# Patient Record
Sex: MALE | Race: WHITE | NOT HISPANIC OR LATINO
[De-identification: names, ages, dates, MRNs, and addresses within clinical notes are randomized per-mention and may not be internally consistent; named-entity substitution may affect disease eponyms.]

---

## 2020-10-30 PROBLEM — Z00.00 ENCOUNTER FOR PREVENTIVE HEALTH EXAMINATION: Status: ACTIVE | Noted: 2020-10-30

## 2020-11-19 DIAGNOSIS — Z87.2 PERSONAL HISTORY OF DISEASES OF THE SKIN AND SUBCUTANEOUS TISSUE: ICD-10-CM

## 2020-11-19 DIAGNOSIS — Z86.39 PERSONAL HISTORY OF OTHER ENDOCRINE, NUTRITIONAL AND METABOLIC DISEASE: ICD-10-CM

## 2020-11-19 DIAGNOSIS — Z78.9 OTHER SPECIFIED HEALTH STATUS: ICD-10-CM

## 2020-11-19 DIAGNOSIS — Z87.19 PERSONAL HISTORY OF OTHER DISEASES OF THE DIGESTIVE SYSTEM: ICD-10-CM

## 2020-11-19 DIAGNOSIS — N40.0 BENIGN PROSTATIC HYPERPLASIA WITHOUT LOWER URINARY TRACT SYMPMS: ICD-10-CM

## 2021-01-21 ENCOUNTER — RX RENEWAL (OUTPATIENT)
Age: 86
End: 2021-01-21

## 2021-04-02 ENCOUNTER — APPOINTMENT (OUTPATIENT)
Dept: INTERNAL MEDICINE | Facility: CLINIC | Age: 86
End: 2021-04-02
Payer: MEDICARE

## 2021-04-02 VITALS — HEART RATE: 70 BPM | SYSTOLIC BLOOD PRESSURE: 144 MMHG | RESPIRATION RATE: 16 BRPM | DIASTOLIC BLOOD PRESSURE: 88 MMHG

## 2021-04-02 VITALS — BODY MASS INDEX: 28.49 KG/M2 | WEIGHT: 188 LBS | HEIGHT: 68 IN

## 2021-04-02 PROCEDURE — G0439: CPT

## 2021-04-02 PROCEDURE — 36415 COLL VENOUS BLD VENIPUNCTURE: CPT

## 2021-04-02 NOTE — PLAN
[FreeTextEntry1] : His exam is essentially unchanged.  New\par \par Hypertension his blood pressure is slightly elevated today but he has been only taking one of his Zestoretic pills not to.  He will go back to taking 2 a day and try to monitor his pressure at home and if is any problem he will follow-up here.\par \par History of BPH–he has been relatively asymptomatic and will continue with Barboza and 10 mg daily.\par \par Arthritis–he uses Voltaren on a as needed basis and was told to try to limit how much of that he takes and he can try supplementing with Tylenol.\par \par He is planning on getting the first Covid vaccine hopefully next week.

## 2021-04-02 NOTE — REVIEW OF SYSTEMS
[Joint Pain] : joint pain [Back Pain] : back pain [Negative] : Psychiatric [Fever] : no fever [Chills] : no chills [Recent Change In Weight] : ~T no recent weight change [Chest Pain] : no chest pain [Palpitations] : no palpitations [Lower Ext Edema] : no lower extremity edema [Shortness Of Breath] : no shortness of breath [Abdominal Pain] : no abdominal pain [Diarrhea] : no diarrhea [Dysuria] : no dysuria [Muscle Weakness] : no muscle weakness [Joint Swelling] : no joint swelling [Headache] : no headache [Dizziness] : no dizziness [Fainting] : no fainting

## 2021-04-02 NOTE — HISTORY OF PRESENT ILLNESS
[de-identified] : 88 y/o male presents for annual wellness visit and fasting labs.  He has been generally well without any recent illness.  Complains of some ongoing arthritis pain issues but other than that has been generally well.

## 2021-04-03 LAB
BASOPHILS # BLD AUTO: 0.01 K/UL
BASOPHILS NFR BLD AUTO: 0.2 %
EOSINOPHIL # BLD AUTO: 0.1 K/UL
EOSINOPHIL NFR BLD AUTO: 2.3 %
HCT VFR BLD CALC: 38.7 %
HGB BLD-MCNC: 12.8 G/DL
IMM GRANULOCYTES NFR BLD AUTO: 0.2 %
LYMPHOCYTES # BLD AUTO: 0.7 K/UL
LYMPHOCYTES NFR BLD AUTO: 16.3 %
MAN DIFF?: NORMAL
MCHC RBC-ENTMCNC: 30.3 PG
MCHC RBC-ENTMCNC: 33.1 GM/DL
MCV RBC AUTO: 91.5 FL
MONOCYTES # BLD AUTO: 0.41 K/UL
MONOCYTES NFR BLD AUTO: 9.6 %
NEUTROPHILS # BLD AUTO: 3.06 K/UL
NEUTROPHILS NFR BLD AUTO: 71.4 %
PLATELET # BLD AUTO: 153 K/UL
RBC # BLD: 4.23 M/UL
RBC # FLD: 13.6 %
WBC # FLD AUTO: 4.29 K/UL

## 2021-04-04 LAB
ALBUMIN SERPL ELPH-MCNC: 4.4 G/DL
ALP BLD-CCNC: 81 U/L
ALT SERPL-CCNC: 19 U/L
ANION GAP SERPL CALC-SCNC: 9 MMOL/L
AST SERPL-CCNC: 21 U/L
BILIRUB SERPL-MCNC: 0.7 MG/DL
BUN SERPL-MCNC: 27 MG/DL
CALCIUM SERPL-MCNC: 9.4 MG/DL
CHLORIDE SERPL-SCNC: 105 MMOL/L
CHOLEST SERPL-MCNC: 222 MG/DL
CO2 SERPL-SCNC: 26 MMOL/L
CREAT SERPL-MCNC: 0.81 MG/DL
GLUCOSE SERPL-MCNC: 101 MG/DL
HDLC SERPL-MCNC: 74 MG/DL
LDLC SERPL CALC-MCNC: 129 MG/DL
NONHDLC SERPL-MCNC: 147 MG/DL
POTASSIUM SERPL-SCNC: 4.3 MMOL/L
PROT SERPL-MCNC: 6.5 G/DL
PSA SERPL-MCNC: 5.94 NG/ML
SODIUM SERPL-SCNC: 140 MMOL/L
TRIGL SERPL-MCNC: 94 MG/DL

## 2021-05-06 ENCOUNTER — RX RENEWAL (OUTPATIENT)
Age: 86
End: 2021-05-06

## 2021-06-17 ENCOUNTER — APPOINTMENT (OUTPATIENT)
Dept: OPHTHALMOLOGY | Facility: CLINIC | Age: 86
End: 2021-06-17

## 2021-07-27 ENCOUNTER — RX RENEWAL (OUTPATIENT)
Age: 86
End: 2021-07-27

## 2021-08-10 ENCOUNTER — NON-APPOINTMENT (OUTPATIENT)
Age: 86
End: 2021-08-10

## 2021-08-10 ENCOUNTER — APPOINTMENT (OUTPATIENT)
Dept: OPHTHALMOLOGY | Facility: CLINIC | Age: 86
End: 2021-08-10
Payer: MEDICARE

## 2021-08-10 PROCEDURE — 92014 COMPRE OPH EXAM EST PT 1/>: CPT

## 2021-08-10 PROCEDURE — 92133 CPTRZD OPH DX IMG PST SGM ON: CPT

## 2021-10-10 ENCOUNTER — RX RENEWAL (OUTPATIENT)
Age: 86
End: 2021-10-10

## 2021-10-13 ENCOUNTER — APPOINTMENT (OUTPATIENT)
Dept: INTERNAL MEDICINE | Facility: CLINIC | Age: 86
End: 2021-10-13
Payer: MEDICARE

## 2021-10-13 ENCOUNTER — NON-APPOINTMENT (OUTPATIENT)
Age: 86
End: 2021-10-13

## 2021-10-13 VITALS
WEIGHT: 192 LBS | SYSTOLIC BLOOD PRESSURE: 130 MMHG | HEIGHT: 68 IN | BODY MASS INDEX: 29.1 KG/M2 | DIASTOLIC BLOOD PRESSURE: 85 MMHG

## 2021-10-13 DIAGNOSIS — Z23 ENCOUNTER FOR IMMUNIZATION: ICD-10-CM

## 2021-10-13 PROCEDURE — 90662 IIV NO PRSV INCREASED AG IM: CPT

## 2021-10-13 PROCEDURE — 99214 OFFICE O/P EST MOD 30 MIN: CPT | Mod: 25

## 2021-10-13 PROCEDURE — G0008: CPT

## 2021-10-13 PROCEDURE — 36415 COLL VENOUS BLD VENIPUNCTURE: CPT

## 2021-10-13 NOTE — ASSESSMENT
[FreeTextEntry1] : Hypertension–his blood pressure remains well controlled and he is going to continue with lisinopril HCT 40/25 daily.\par \par BPH–his symptoms have been stable and his previous PSA on 4/2/2021 was 5.94.  He will continue with Terazosin 10 mg daily.\par \par Hyperlipidemia–follow-up lipid profile was sent his previous cholesterol was 222 with an LDL of 129.  He is not on any medication at present.\par \par Arthritis–his symptoms are essentially unchanged and he does use Voltaren 75 mg as needed.  He was questioned about overusing it because of potential side effects and he understands.\par \par He received high-dose flu vaccine today.\par \par Total of 30 minutes was spent in this encounter.

## 2021-10-14 LAB
ALBUMIN SERPL ELPH-MCNC: 3.9 G/DL
ALP BLD-CCNC: 75 U/L
ALT SERPL-CCNC: 20 U/L
ANION GAP SERPL CALC-SCNC: 11 MMOL/L
AST SERPL-CCNC: 20 U/L
BASOPHILS # BLD AUTO: 0.02 K/UL
BASOPHILS NFR BLD AUTO: 0.5 %
BILIRUB SERPL-MCNC: 0.6 MG/DL
BUN SERPL-MCNC: 35 MG/DL
CALCIUM SERPL-MCNC: 9.3 MG/DL
CHLORIDE SERPL-SCNC: 104 MMOL/L
CHOLEST SERPL-MCNC: 194 MG/DL
CO2 SERPL-SCNC: 26 MMOL/L
CREAT SERPL-MCNC: 0.94 MG/DL
EOSINOPHIL # BLD AUTO: 0.09 K/UL
EOSINOPHIL NFR BLD AUTO: 2.3 %
GLUCOSE SERPL-MCNC: 104 MG/DL
HCT VFR BLD CALC: 39.1 %
HDLC SERPL-MCNC: 69 MG/DL
HGB BLD-MCNC: 12.5 G/DL
IMM GRANULOCYTES NFR BLD AUTO: 0.5 %
LDLC SERPL CALC-MCNC: 109 MG/DL
LYMPHOCYTES # BLD AUTO: 0.77 K/UL
LYMPHOCYTES NFR BLD AUTO: 19.3 %
MAN DIFF?: NORMAL
MCHC RBC-ENTMCNC: 30.3 PG
MCHC RBC-ENTMCNC: 32 GM/DL
MCV RBC AUTO: 94.9 FL
MONOCYTES # BLD AUTO: 0.34 K/UL
MONOCYTES NFR BLD AUTO: 8.5 %
NEUTROPHILS # BLD AUTO: 2.75 K/UL
NEUTROPHILS NFR BLD AUTO: 68.9 %
NONHDLC SERPL-MCNC: 125 MG/DL
PLATELET # BLD AUTO: 193 K/UL
POTASSIUM SERPL-SCNC: 4.7 MMOL/L
PROT SERPL-MCNC: 6.4 G/DL
RBC # BLD: 4.12 M/UL
RBC # FLD: 13.4 %
SODIUM SERPL-SCNC: 142 MMOL/L
TRIGL SERPL-MCNC: 83 MG/DL
WBC # FLD AUTO: 3.99 K/UL

## 2021-12-14 ENCOUNTER — NON-APPOINTMENT (OUTPATIENT)
Age: 86
End: 2021-12-14

## 2021-12-14 ENCOUNTER — APPOINTMENT (OUTPATIENT)
Dept: OPHTHALMOLOGY | Facility: CLINIC | Age: 86
End: 2021-12-14
Payer: MEDICARE

## 2021-12-14 PROCEDURE — 92250 FUNDUS PHOTOGRAPHY W/I&R: CPT

## 2021-12-14 PROCEDURE — 92014 COMPRE OPH EXAM EST PT 1/>: CPT

## 2022-02-02 ENCOUNTER — RX RENEWAL (OUTPATIENT)
Age: 87
End: 2022-02-02

## 2022-03-15 ENCOUNTER — RX RENEWAL (OUTPATIENT)
Age: 87
End: 2022-03-15

## 2022-04-02 ENCOUNTER — RX RENEWAL (OUTPATIENT)
Age: 87
End: 2022-04-02

## 2022-05-02 ENCOUNTER — APPOINTMENT (OUTPATIENT)
Dept: INTERNAL MEDICINE | Facility: CLINIC | Age: 87
End: 2022-05-02
Payer: MEDICARE

## 2022-05-02 ENCOUNTER — NON-APPOINTMENT (OUTPATIENT)
Age: 87
End: 2022-05-02

## 2022-05-02 VITALS — HEIGHT: 68 IN | BODY MASS INDEX: 28.49 KG/M2 | WEIGHT: 188 LBS

## 2022-05-02 VITALS — SYSTOLIC BLOOD PRESSURE: 138 MMHG | DIASTOLIC BLOOD PRESSURE: 70 MMHG

## 2022-05-02 PROCEDURE — G0439: CPT

## 2022-05-02 PROCEDURE — 36415 COLL VENOUS BLD VENIPUNCTURE: CPT

## 2022-05-02 NOTE — ASSESSMENT
[FreeTextEntry1] : His exam is unchanged/unremarkable.\par \par Hypertension–his blood pressure is controlled but high normal.  He will continue with lisinopril HCT 40/25 daily and [id number] mg daily.  He was asked to try to check his pressure periodically at home to make sure that it remains controlled.\par \par BPH–symptoms well controlled and he will continue Hytrin 10 mg daily.  A follow-up PSA was sent.\par \par Hyperlipidemia–follow-up lipid profile was sent.  His most recent cholesterol is well controlled but have been elevated in the past.  He is on the medication.\par \par Arthritis no significant change in his symptoms and he does take Voltaren 75 mg as needed.  He was told to try to limit this as much as possible and he understands.

## 2022-05-02 NOTE — HISTORY OF PRESENT ILLNESS
[de-identified] : 89 y/o male presents for annual Medicare wellness visit and prescription renewals.\par History of hypertension, BPH and arthritis.\par Has been generally well without any recent illness.  His wife has ongoing dementia issues and it is causing him stress.  He remains very independent and active.

## 2022-05-02 NOTE — HEALTH RISK ASSESSMENT
[Never] : Never [No] : In the past 12 months have you used drugs other than those required for medical reasons? No [No falls in past year] : Patient reported no falls in the past year [0] : 2) Feeling down, depressed, or hopeless: Not at all (0) [PHQ-2 Negative - No further assessment needed] : PHQ-2 Negative - No further assessment needed [[id number]] : 0

## 2022-05-03 LAB
ALBUMIN SERPL ELPH-MCNC: 4.2 G/DL
ALP BLD-CCNC: 76 U/L
ALT SERPL-CCNC: 15 U/L
ANION GAP SERPL CALC-SCNC: 13 MMOL/L
AST SERPL-CCNC: 16 U/L
BASOPHILS # BLD AUTO: 0.01 K/UL
BASOPHILS NFR BLD AUTO: 0.2 %
BILIRUB SERPL-MCNC: 0.7 MG/DL
BUN SERPL-MCNC: 28 MG/DL
CALCIUM SERPL-MCNC: 9 MG/DL
CHLORIDE SERPL-SCNC: 109 MMOL/L
CHOLEST SERPL-MCNC: 200 MG/DL
CO2 SERPL-SCNC: 24 MMOL/L
CREAT SERPL-MCNC: 0.89 MG/DL
EGFR: 82 ML/MIN/1.73M2
EOSINOPHIL # BLD AUTO: 0.06 K/UL
EOSINOPHIL NFR BLD AUTO: 1.4 %
GLUCOSE SERPL-MCNC: 104 MG/DL
HCT VFR BLD CALC: 38.3 %
HDLC SERPL-MCNC: 70 MG/DL
HGB BLD-MCNC: 12.4 G/DL
IMM GRANULOCYTES NFR BLD AUTO: 0.5 %
LDLC SERPL CALC-MCNC: 115 MG/DL
LYMPHOCYTES # BLD AUTO: 0.79 K/UL
LYMPHOCYTES NFR BLD AUTO: 18.1 %
MAN DIFF?: NORMAL
MCHC RBC-ENTMCNC: 30.2 PG
MCHC RBC-ENTMCNC: 32.4 GM/DL
MCV RBC AUTO: 93.4 FL
MONOCYTES # BLD AUTO: 0.34 K/UL
MONOCYTES NFR BLD AUTO: 7.8 %
NEUTROPHILS # BLD AUTO: 3.14 K/UL
NEUTROPHILS NFR BLD AUTO: 72 %
NONHDLC SERPL-MCNC: 129 MG/DL
PLATELET # BLD AUTO: 145 K/UL
POTASSIUM SERPL-SCNC: 4.3 MMOL/L
PROT SERPL-MCNC: 6.1 G/DL
PSA SERPL-MCNC: 5.69 NG/ML
RBC # BLD: 4.1 M/UL
RBC # FLD: 14.4 %
SODIUM SERPL-SCNC: 146 MMOL/L
TRIGL SERPL-MCNC: 73 MG/DL
WBC # FLD AUTO: 4.36 K/UL

## 2022-07-06 ENCOUNTER — RX RENEWAL (OUTPATIENT)
Age: 87
End: 2022-07-06

## 2022-08-02 ENCOUNTER — NON-APPOINTMENT (OUTPATIENT)
Age: 87
End: 2022-08-02

## 2022-08-02 ENCOUNTER — APPOINTMENT (OUTPATIENT)
Dept: OPHTHALMOLOGY | Facility: CLINIC | Age: 87
End: 2022-08-02

## 2022-08-02 PROCEDURE — 92012 INTRM OPH EXAM EST PATIENT: CPT

## 2022-09-05 ENCOUNTER — RX RENEWAL (OUTPATIENT)
Age: 87
End: 2022-09-05

## 2022-09-05 RX ORDER — DICLOFENAC SODIUM 75 MG/1
75 TABLET, DELAYED RELEASE ORAL
Qty: 180 | Refills: 0 | Status: ACTIVE | COMMUNITY
Start: 2021-01-21 | End: 1900-01-01

## 2022-10-31 ENCOUNTER — NON-APPOINTMENT (OUTPATIENT)
Age: 87
End: 2022-10-31

## 2022-10-31 ENCOUNTER — APPOINTMENT (OUTPATIENT)
Dept: INTERNAL MEDICINE | Facility: CLINIC | Age: 87
End: 2022-10-31

## 2022-10-31 VITALS
WEIGHT: 188 LBS | BODY MASS INDEX: 28.49 KG/M2 | DIASTOLIC BLOOD PRESSURE: 78 MMHG | SYSTOLIC BLOOD PRESSURE: 136 MMHG | HEIGHT: 68 IN

## 2022-10-31 DIAGNOSIS — M19.90 UNSPECIFIED OSTEOARTHRITIS, UNSPECIFIED SITE: ICD-10-CM

## 2022-10-31 PROCEDURE — 99214 OFFICE O/P EST MOD 30 MIN: CPT | Mod: 25

## 2022-10-31 PROCEDURE — G0008: CPT

## 2022-10-31 PROCEDURE — 36415 COLL VENOUS BLD VENIPUNCTURE: CPT | Mod: 59

## 2022-10-31 PROCEDURE — 90662 IIV NO PRSV INCREASED AG IM: CPT

## 2022-10-31 NOTE — REVIEW OF SYSTEMS
[Joint Pain] : joint pain [Back Pain] : back pain [Negative] : Psychiatric [Fever] : no fever [Chills] : no chills [Recent Change In Weight] : ~T no recent weight change [Chest Pain] : no chest pain [Palpitations] : no palpitations [Lower Ext Edema] : no lower extremity edema [Shortness Of Breath] : no shortness of breath [Dyspnea on Exertion] : not dyspnea on exertion [Abdominal Pain] : no abdominal pain [Diarrhea] : no diarrhea [Dysuria] : no dysuria [Muscle Weakness] : no muscle weakness [Joint Swelling] : no joint swelling [Headache] : no headache [Dizziness] : no dizziness [Fainting] : no fainting

## 2022-10-31 NOTE — ASSESSMENT
Call to patient regarding appointment scheduled on 4/1/20    Left message to call back to see if E visit could be compelted   [FreeTextEntry1] : Hypertension–his blood pressure remains well controlled and he will continue lisinopril HCT 40/25 daily.\par \par BPH–symptoms are controlled and he remains on Barboza and 10 mg daily.\par \par Hyperlipidemia–follow-up lipid profile sent.  He is not presently on medication.  Most recent cholesterol from 5/22 was 200 with an LDL of 115.\par \par Chronic arthritis/back/leg pain–.  There has been no significant change in his symptoms.\par \par He received high-dose flu vaccine today.

## 2022-10-31 NOTE — HISTORY OF PRESENT ILLNESS
[de-identified] : 88 y/o male presents for follow up and fasting labs.\par He has a history of hypertension, BPH as well as arthritis.\par Has been generally well without any recent illness.  Does complain of ongoing issues with arthritis involving his left leg mostly.  Does not have a lot of pain but does have some weakness when trying to climb stance.  No chest pain but occasional shortness of breath.  He has not been very active and spends most of his time at home with his wife.

## 2022-10-31 NOTE — PHYSICAL EXAM
[No Acute Distress] : no acute distress [No JVD] : no jugular venous distention [No Lymphadenopathy] : no lymphadenopathy [Clear to Auscultation] : lungs were clear to auscultation bilaterally [Normal Rate] : normal rate  [Regular Rhythm] : with a regular rhythm [No Edema] : there was no peripheral edema [No Joint Swelling] : no joint swelling [Grossly Normal Strength/Tone] : grossly normal strength/tone [No Focal Deficits] : no focal deficits [Alert and Oriented x3] : oriented to person, place, and time

## 2022-11-01 LAB
ALBUMIN SERPL ELPH-MCNC: 4.1 G/DL
ALP BLD-CCNC: 71 U/L
ALT SERPL-CCNC: 16 U/L
ANION GAP SERPL CALC-SCNC: 13 MMOL/L
AST SERPL-CCNC: 17 U/L
BASOPHILS # BLD AUTO: 0.02 K/UL
BASOPHILS NFR BLD AUTO: 0.5 %
BILIRUB SERPL-MCNC: 0.6 MG/DL
BUN SERPL-MCNC: 29 MG/DL
CALCIUM SERPL-MCNC: 9.4 MG/DL
CHLORIDE SERPL-SCNC: 107 MMOL/L
CHOLEST SERPL-MCNC: 191 MG/DL
CO2 SERPL-SCNC: 25 MMOL/L
CREAT SERPL-MCNC: 0.91 MG/DL
EGFR: 81 ML/MIN/1.73M2
EOSINOPHIL # BLD AUTO: 0.08 K/UL
EOSINOPHIL NFR BLD AUTO: 1.9 %
GLUCOSE SERPL-MCNC: 98 MG/DL
HCT VFR BLD CALC: 36.5 %
HDLC SERPL-MCNC: 68 MG/DL
HGB BLD-MCNC: 11.7 G/DL
IMM GRANULOCYTES NFR BLD AUTO: 0.2 %
LDLC SERPL CALC-MCNC: 107 MG/DL
LYMPHOCYTES # BLD AUTO: 0.65 K/UL
LYMPHOCYTES NFR BLD AUTO: 15.2 %
MAN DIFF?: NORMAL
MCHC RBC-ENTMCNC: 30.9 PG
MCHC RBC-ENTMCNC: 32.1 GM/DL
MCV RBC AUTO: 96.3 FL
MONOCYTES # BLD AUTO: 0.38 K/UL
MONOCYTES NFR BLD AUTO: 8.9 %
NEUTROPHILS # BLD AUTO: 3.14 K/UL
NEUTROPHILS NFR BLD AUTO: 73.3 %
NONHDLC SERPL-MCNC: 123 MG/DL
PLATELET # BLD AUTO: 152 K/UL
POTASSIUM SERPL-SCNC: 4.1 MMOL/L
PROT SERPL-MCNC: 6 G/DL
RBC # BLD: 3.79 M/UL
RBC # FLD: 14.4 %
SODIUM SERPL-SCNC: 145 MMOL/L
TRIGL SERPL-MCNC: 80 MG/DL
WBC # FLD AUTO: 4.28 K/UL

## 2022-11-08 ENCOUNTER — APPOINTMENT (OUTPATIENT)
Dept: OPHTHALMOLOGY | Facility: CLINIC | Age: 87
End: 2022-11-08

## 2022-11-08 ENCOUNTER — NON-APPOINTMENT (OUTPATIENT)
Age: 87
End: 2022-11-08

## 2022-11-08 PROCEDURE — 92133 CPTRZD OPH DX IMG PST SGM ON: CPT

## 2022-11-08 PROCEDURE — 92014 COMPRE OPH EXAM EST PT 1/>: CPT

## 2022-11-24 ENCOUNTER — RX RENEWAL (OUTPATIENT)
Age: 87
End: 2022-11-24

## 2023-02-24 ENCOUNTER — INPATIENT (INPATIENT)
Facility: HOSPITAL | Age: 88
LOS: 10 days | Discharge: NURSING, REHAB OR LONG-TERM CARE | DRG: 260 | End: 2023-03-07
Attending: HOSPITALIST
Payer: MEDICARE

## 2023-02-24 ENCOUNTER — NON-APPOINTMENT (OUTPATIENT)
Age: 88
End: 2023-02-24

## 2023-02-24 ENCOUNTER — APPOINTMENT (OUTPATIENT)
Dept: INTERNAL MEDICINE | Facility: CLINIC | Age: 88
End: 2023-02-24
Payer: MEDICARE

## 2023-02-24 VITALS
OXYGEN SATURATION: 100 % | RESPIRATION RATE: 20 BRPM | HEART RATE: 87 BPM | SYSTOLIC BLOOD PRESSURE: 138 MMHG | HEIGHT: 70 IN | TEMPERATURE: 98 F | DIASTOLIC BLOOD PRESSURE: 91 MMHG | WEIGHT: 182.1 LBS

## 2023-02-24 VITALS — SYSTOLIC BLOOD PRESSURE: 130 MMHG | HEART RATE: 100 BPM | DIASTOLIC BLOOD PRESSURE: 66 MMHG | RESPIRATION RATE: 22 BRPM

## 2023-02-24 DIAGNOSIS — I48.91 UNSPECIFIED ATRIAL FIBRILLATION: ICD-10-CM

## 2023-02-24 DIAGNOSIS — I50.9 HEART FAILURE, UNSPECIFIED: ICD-10-CM

## 2023-02-24 DIAGNOSIS — I10 ESSENTIAL (PRIMARY) HYPERTENSION: ICD-10-CM

## 2023-02-24 LAB
ALBUMIN SERPL ELPH-MCNC: 3.6 G/DL — SIGNIFICANT CHANGE UP (ref 3.3–5)
ALP SERPL-CCNC: 82 U/L — SIGNIFICANT CHANGE UP (ref 40–120)
ALT FLD-CCNC: 24 U/L — SIGNIFICANT CHANGE UP (ref 12–78)
ANION GAP SERPL CALC-SCNC: 5 MMOL/L — SIGNIFICANT CHANGE UP (ref 5–17)
APTT BLD: 31.3 SEC — SIGNIFICANT CHANGE UP (ref 27.5–35.5)
AST SERPL-CCNC: 18 U/L — SIGNIFICANT CHANGE UP (ref 15–37)
BASOPHILS # BLD AUTO: 0.02 K/UL — SIGNIFICANT CHANGE UP (ref 0–0.2)
BASOPHILS NFR BLD AUTO: 0.4 % — SIGNIFICANT CHANGE UP (ref 0–2)
BILIRUB SERPL-MCNC: 1 MG/DL — SIGNIFICANT CHANGE UP (ref 0.2–1.2)
BUN SERPL-MCNC: 55 MG/DL — HIGH (ref 7–23)
CALCIUM SERPL-MCNC: 9.3 MG/DL — SIGNIFICANT CHANGE UP (ref 8.5–10.1)
CHLORIDE SERPL-SCNC: 109 MMOL/L — HIGH (ref 96–108)
CO2 SERPL-SCNC: 25 MMOL/L — SIGNIFICANT CHANGE UP (ref 22–31)
CREAT SERPL-MCNC: 1.32 MG/DL — HIGH (ref 0.5–1.3)
D DIMER BLD IA.RAPID-MCNC: 1163 NG/ML DDU — HIGH
EGFR: 52 ML/MIN/1.73M2 — LOW
EOSINOPHIL # BLD AUTO: 0.06 K/UL — SIGNIFICANT CHANGE UP (ref 0–0.5)
EOSINOPHIL NFR BLD AUTO: 1.3 % — SIGNIFICANT CHANGE UP (ref 0–6)
GLUCOSE SERPL-MCNC: 110 MG/DL — HIGH (ref 70–99)
HCT VFR BLD CALC: 35.6 % — LOW (ref 39–50)
HGB BLD-MCNC: 11.2 G/DL — LOW (ref 13–17)
IMM GRANULOCYTES NFR BLD AUTO: 0.2 % — SIGNIFICANT CHANGE UP (ref 0–0.9)
INR BLD: 1.22 RATIO — HIGH (ref 0.88–1.16)
LYMPHOCYTES # BLD AUTO: 0.44 K/UL — LOW (ref 1–3.3)
LYMPHOCYTES # BLD AUTO: 9.2 % — LOW (ref 13–44)
MAGNESIUM SERPL-MCNC: 2.5 MG/DL — SIGNIFICANT CHANGE UP (ref 1.6–2.6)
MANUAL SMEAR VERIFICATION: SIGNIFICANT CHANGE UP
MCHC RBC-ENTMCNC: 29.2 PG — SIGNIFICANT CHANGE UP (ref 27–34)
MCHC RBC-ENTMCNC: 31.5 GM/DL — LOW (ref 32–36)
MCV RBC AUTO: 92.7 FL — SIGNIFICANT CHANGE UP (ref 80–100)
MONOCYTES # BLD AUTO: 0.44 K/UL — SIGNIFICANT CHANGE UP (ref 0–0.9)
MONOCYTES NFR BLD AUTO: 9.2 % — SIGNIFICANT CHANGE UP (ref 2–14)
NEUTROPHILS # BLD AUTO: 3.81 K/UL — SIGNIFICANT CHANGE UP (ref 1.8–7.4)
NEUTROPHILS NFR BLD AUTO: 79.7 % — HIGH (ref 43–77)
NT-PROBNP SERPL-SCNC: HIGH PG/ML (ref 0–450)
PLAT MORPH BLD: NORMAL — SIGNIFICANT CHANGE UP
PLATELET # BLD AUTO: 125 K/UL — LOW (ref 150–400)
POTASSIUM SERPL-MCNC: 4.2 MMOL/L — SIGNIFICANT CHANGE UP (ref 3.5–5.3)
POTASSIUM SERPL-SCNC: 4.2 MMOL/L — SIGNIFICANT CHANGE UP (ref 3.5–5.3)
PROT SERPL-MCNC: 7 GM/DL — SIGNIFICANT CHANGE UP (ref 6–8.3)
PROTHROM AB SERPL-ACNC: 14.2 SEC — HIGH (ref 10.5–13.4)
RAPID RVP RESULT: SIGNIFICANT CHANGE UP
RBC # BLD: 3.84 M/UL — LOW (ref 4.2–5.8)
RBC # FLD: 15.3 % — HIGH (ref 10.3–14.5)
RBC BLD AUTO: NORMAL — SIGNIFICANT CHANGE UP
SARS-COV-2 RNA SPEC QL NAA+PROBE: SIGNIFICANT CHANGE UP
SODIUM SERPL-SCNC: 139 MMOL/L — SIGNIFICANT CHANGE UP (ref 135–145)
TROPONIN I, HIGH SENSITIVITY RESULT: 90.54 NG/L — HIGH
WBC # BLD: 4.78 K/UL — SIGNIFICANT CHANGE UP (ref 3.8–10.5)
WBC # FLD AUTO: 4.78 K/UL — SIGNIFICANT CHANGE UP (ref 3.8–10.5)

## 2023-02-24 PROCEDURE — 93306 TTE W/DOPPLER COMPLETE: CPT

## 2023-02-24 PROCEDURE — 93458 L HRT ARTERY/VENTRICLE ANGIO: CPT

## 2023-02-24 PROCEDURE — C1894: CPT

## 2023-02-24 PROCEDURE — 80053 COMPREHEN METABOLIC PANEL: CPT

## 2023-02-24 PROCEDURE — 36415 COLL VENOUS BLD VENIPUNCTURE: CPT

## 2023-02-24 PROCEDURE — 99223 1ST HOSP IP/OBS HIGH 75: CPT

## 2023-02-24 PROCEDURE — 82272 OCCULT BLD FECES 1-3 TESTS: CPT

## 2023-02-24 PROCEDURE — 99214 OFFICE O/P EST MOD 30 MIN: CPT | Mod: 25

## 2023-02-24 PROCEDURE — 87086 URINE CULTURE/COLONY COUNT: CPT

## 2023-02-24 PROCEDURE — 80048 BASIC METABOLIC PNL TOTAL CA: CPT

## 2023-02-24 PROCEDURE — 93000 ELECTROCARDIOGRAM COMPLETE: CPT

## 2023-02-24 PROCEDURE — 71045 X-RAY EXAM CHEST 1 VIEW: CPT

## 2023-02-24 PROCEDURE — 85027 COMPLETE CBC AUTOMATED: CPT

## 2023-02-24 PROCEDURE — 71275 CT ANGIOGRAPHY CHEST: CPT | Mod: 26

## 2023-02-24 PROCEDURE — 97530 THERAPEUTIC ACTIVITIES: CPT | Mod: GP

## 2023-02-24 PROCEDURE — 85610 PROTHROMBIN TIME: CPT

## 2023-02-24 PROCEDURE — 84484 ASSAY OF TROPONIN QUANT: CPT

## 2023-02-24 PROCEDURE — 83735 ASSAY OF MAGNESIUM: CPT

## 2023-02-24 PROCEDURE — 97162 PT EVAL MOD COMPLEX 30 MIN: CPT | Mod: GP

## 2023-02-24 PROCEDURE — 87635 SARS-COV-2 COVID-19 AMP PRB: CPT

## 2023-02-24 PROCEDURE — C1769: CPT

## 2023-02-24 PROCEDURE — 82565 ASSAY OF CREATININE: CPT

## 2023-02-24 PROCEDURE — 97116 GAIT TRAINING THERAPY: CPT | Mod: GP

## 2023-02-24 PROCEDURE — 81001 URINALYSIS AUTO W/SCOPE: CPT

## 2023-02-24 PROCEDURE — 93010 ELECTROCARDIOGRAM REPORT: CPT

## 2023-02-24 PROCEDURE — U0003: CPT

## 2023-02-24 PROCEDURE — U0005: CPT

## 2023-02-24 PROCEDURE — 71045 X-RAY EXAM CHEST 1 VIEW: CPT | Mod: 26

## 2023-02-24 PROCEDURE — 80061 LIPID PANEL: CPT

## 2023-02-24 PROCEDURE — 93005 ELECTROCARDIOGRAM TRACING: CPT

## 2023-02-24 PROCEDURE — 85025 COMPLETE CBC W/AUTO DIFF WBC: CPT

## 2023-02-24 PROCEDURE — 99497 ADVNCD CARE PLAN 30 MIN: CPT | Mod: 25

## 2023-02-24 PROCEDURE — C1887: CPT

## 2023-02-24 PROCEDURE — 33285 INSJ SUBQ CAR RHYTHM MNTR: CPT

## 2023-02-24 PROCEDURE — 71275 CT ANGIOGRAPHY CHEST: CPT | Mod: MA

## 2023-02-24 PROCEDURE — 84443 ASSAY THYROID STIM HORMONE: CPT

## 2023-02-24 PROCEDURE — 80076 HEPATIC FUNCTION PANEL: CPT

## 2023-02-24 PROCEDURE — C1764: CPT

## 2023-02-24 PROCEDURE — 99291 CRITICAL CARE FIRST HOUR: CPT

## 2023-02-24 RX ORDER — ENOXAPARIN SODIUM 100 MG/ML
80 INJECTION SUBCUTANEOUS ONCE
Refills: 0 | Status: COMPLETED | OUTPATIENT
Start: 2023-02-24 | End: 2023-02-24

## 2023-02-24 RX ORDER — METOPROLOL TARTRATE 50 MG
25 TABLET ORAL ONCE
Refills: 0 | Status: COMPLETED | OUTPATIENT
Start: 2023-02-24 | End: 2023-02-24

## 2023-02-24 RX ORDER — TIMOLOL 0.5 %
1 DROPS OPHTHALMIC (EYE)
Qty: 0 | Refills: 0 | DISCHARGE

## 2023-02-24 RX ORDER — TAMSULOSIN HYDROCHLORIDE 0.4 MG/1
0.8 CAPSULE ORAL AT BEDTIME
Refills: 0 | Status: DISCONTINUED | OUTPATIENT
Start: 2023-02-24 | End: 2023-03-07

## 2023-02-24 RX ORDER — LATANOPROST 0.05 MG/ML
1 SOLUTION/ DROPS OPHTHALMIC; TOPICAL AT BEDTIME
Refills: 0 | Status: DISCONTINUED | OUTPATIENT
Start: 2023-02-24 | End: 2023-03-07

## 2023-02-24 RX ORDER — FUROSEMIDE 40 MG
40 TABLET ORAL ONCE
Refills: 0 | Status: COMPLETED | OUTPATIENT
Start: 2023-02-24 | End: 2023-02-24

## 2023-02-24 RX ORDER — ENOXAPARIN SODIUM 100 MG/ML
80 INJECTION SUBCUTANEOUS EVERY 24 HOURS
Refills: 0 | Status: DISCONTINUED | OUTPATIENT
Start: 2023-02-25 | End: 2023-02-25

## 2023-02-24 RX ORDER — DICLOFENAC SODIUM 75 MG/1
1 TABLET, DELAYED RELEASE ORAL
Qty: 0 | Refills: 0 | DISCHARGE

## 2023-02-24 RX ORDER — DICLOFENAC SODIUM 75 MG/1
75 TABLET, DELAYED RELEASE ORAL
Refills: 0 | Status: DISCONTINUED | OUTPATIENT
Start: 2023-02-24 | End: 2023-03-07

## 2023-02-24 RX ORDER — ASPIRIN/CALCIUM CARB/MAGNESIUM 324 MG
1 TABLET ORAL
Qty: 0 | Refills: 0 | DISCHARGE

## 2023-02-24 RX ORDER — TERAZOSIN HYDROCHLORIDE 10 MG/1
1 CAPSULE ORAL
Qty: 0 | Refills: 0 | DISCHARGE

## 2023-02-24 RX ORDER — ASPIRIN/CALCIUM CARB/MAGNESIUM 324 MG
81 TABLET ORAL DAILY
Refills: 0 | Status: DISCONTINUED | OUTPATIENT
Start: 2023-02-24 | End: 2023-03-07

## 2023-02-24 RX ORDER — FUROSEMIDE 40 MG
40 TABLET ORAL DAILY
Refills: 0 | Status: DISCONTINUED | OUTPATIENT
Start: 2023-02-25 | End: 2023-02-26

## 2023-02-24 RX ORDER — METOPROLOL TARTRATE 50 MG
25 TABLET ORAL EVERY 12 HOURS
Refills: 0 | Status: DISCONTINUED | OUTPATIENT
Start: 2023-02-25 | End: 2023-02-25

## 2023-02-24 RX ORDER — LISINOPRIL 2.5 MG/1
10 TABLET ORAL DAILY
Refills: 0 | Status: DISCONTINUED | OUTPATIENT
Start: 2023-02-24 | End: 2023-02-24

## 2023-02-24 RX ORDER — LATANOPROST 0.05 MG/ML
1 SOLUTION/ DROPS OPHTHALMIC; TOPICAL
Qty: 0 | Refills: 0 | DISCHARGE

## 2023-02-24 RX ORDER — METOPROLOL TARTRATE 50 MG
5 TABLET ORAL ONCE
Refills: 0 | Status: COMPLETED | OUTPATIENT
Start: 2023-02-24 | End: 2023-02-24

## 2023-02-24 RX ORDER — TIMOLOL 0.5 %
1 DROPS OPHTHALMIC (EYE)
Refills: 0 | Status: DISCONTINUED | OUTPATIENT
Start: 2023-02-24 | End: 2023-03-07

## 2023-02-24 RX ADMIN — LATANOPROST 1 DROP(S): 0.05 SOLUTION/ DROPS OPHTHALMIC; TOPICAL at 21:57

## 2023-02-24 RX ADMIN — Medication 25 MILLIGRAM(S): at 17:56

## 2023-02-24 RX ADMIN — Medication 40 MILLIGRAM(S): at 15:43

## 2023-02-24 RX ADMIN — TAMSULOSIN HYDROCHLORIDE 0.8 MILLIGRAM(S): 0.4 CAPSULE ORAL at 21:57

## 2023-02-24 RX ADMIN — Medication 5 MILLIGRAM(S): at 15:43

## 2023-02-24 RX ADMIN — ENOXAPARIN SODIUM 80 MILLIGRAM(S): 100 INJECTION SUBCUTANEOUS at 17:56

## 2023-02-24 NOTE — H&P ADULT - NSICDXFAMILYHX_GEN_ALL_CORE_FT
FAMILY HISTORY:  Father  Still living? No  Family history of hypertension, Age at diagnosis: Age Unknown  FH: type 2 diabetes mellitus, Age at diagnosis: Age Unknown    Mother  Still living? No  Family hx of hypertension, Age at diagnosis: Age Unknown

## 2023-02-24 NOTE — CONSULT NOTE ADULT - PROBLEM SELECTOR RECOMMENDATION 3
Chronic; would hold lisinopril-hctz in favor of rate control agents since he is tachycardic and with ER BP of 120/89.

## 2023-02-24 NOTE — H&P ADULT - HISTORY OF PRESENT ILLNESS
Pt is a pleasant 88 yo male w/ PMHx of CHF, HTN , BPH   who was sent by    to  [facility]  with complaint of   dyspnea on exertion and new onset afib,  over the last 2 weeks. Pt is on Lasix. Sx worsened over the past 2 weeks to the point its at rest.     Pt with leg swelling bilateral. Denies cp, cough, abd pain, n/v/d. Pt is a pleasant 88 yo male w/ PMHx of CHF, HTN on lisinopril/HTZ,  BPH   who was sent by his doctor  to  [facility]  with complaint of  increasing dyspnea on exertion, dizziness and new onset afib,  over the last 2 weeks,  He now also has dyspnea  at rest and has noticed increased leg swelling, as noted by his daughters here at bedside in the ED.   Pt has not been on lasix.     Pt denies cp, cough, no abd pain, no n/v/d, no night time orthopnea or PND,  no palpitations, no f/c, no resp or urinary complaints.   Pt is a pleasant 88 yo male w/ PMHx of CHF, HTN on lisinopril/HTZ,  BPH  who was sent by his doctor  to  [facility]  with complaint of  increasing dyspnea on exertion, dizziness and new onset afib,  over the last 2 weeks,  He now also has dyspnea  at rest and has noticed increased leg swelling, as noted by his daughters here at bedside in the ED.   Pt has not been on lasix.     Pt denies cp, cough, no abd pain, no n/v/d, no night time orthopnea or PND,  no palpitations, no f/c, no resp or urinary complaints.       Pt is a pleasant 88 yo male w/ PMHx of CHF, HTN on lisinopril/HTZ,  BPH  who was sent by his doctor, Dr. Umanzor  to  [facility]  with complaint of  increasing dyspnea on exertion, dizziness and new onset afib.   Pt reported dyspnea over the last 2 weeks.   He now also has dyspnea  at rest and has noticed increased leg swelling, as noted by his daughters here at bedside in the ED.   Pt has not been on lasix.     Pt denies cp, cough, no abd pain, no n/v/d, no night time orthopnea or PND,  no palpitations, no f/c, no resp or urinary complaints.

## 2023-02-24 NOTE — ED ADULT NURSE NOTE - OBJECTIVE STATEMENT
Patient presents to the emergency room with complaints of shortness of breath. Patient states his PCP sent him here due to shortness of breath exacerbated by exertion and new onset afib. Patient tachypneic at this time, 02 sat 97% room air, denies cough, CP, palpitations, syncope, N/V, syncope. Patient with bilateral lower extremity pitting edema which has worsened this week per daughter and daughter also states she has heard patient wheezing at home. Patient states he takes baby aspirin and has HTN.

## 2023-02-24 NOTE — H&P ADULT - NSHPSOCIALHISTORY_GEN_ALL_CORE
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  He quit smoking in his 30's after   <10 pack years.  NO ETOH/drug abuse.

## 2023-02-24 NOTE — H&P ADULT - MUSCULOSKELETAL
normal gait/strength 5/5 bilateral upper extremities/strength 5/5 bilateral lower extremities ROM intact/normal gait/strength 5/5 bilateral upper extremities/strength 5/5 bilateral lower extremities

## 2023-02-24 NOTE — INPATIENT CERTIFICATION FOR MEDICARE PATIENTS - PHYSICIAN CONCUR
DIARRHEA/PAIN
I concur with the Admission Order and I certify that services are provided in accordance with Section 42 CFR § 412.3

## 2023-02-24 NOTE — ED PROVIDER NOTE - PROGRESS NOTE DETAILS
Morris Lux for attending Dr. Siegel: Pt with new onset Afib and CHF, given 5mg IV lopressor with improvement of rate to 90s. Given 40mg IV Lasix has not urinated yet. Spoke with Dr. Perea who recommends anticoagulation and admission to tele for further evaluation, echocardiogram. Dimer was elevated, will obtain CTA prior to admission to r/o PE.

## 2023-02-24 NOTE — H&P ADULT - ASSESSMENT
- s/p lovenox 80 mg SQ in the ED, cont  - s/p metoprolol 25 mg in ED, cont BID  - s/p lasix 40mg in the ED, cont Q day  - will hold lisinopril/HTZ to avoid hypotension and follow renal function  - DVT proph: on lovenox  - Full Code   Pt is admitted with     New onset atrial fibrillation  New onset CHF  Troponin elevation    - s/p lovenox 80 mg SQ in the ED, cont  - s/p metoprolol 25 mg in ED, cont BID  - s/p lasix 40 mg in the ED, cont Q day  - will hold lisinopril/HTZ to avoid hypotension and follow renal function  - apprec cardiology consult  - DVT proph: on lovenox  - Full Code, MOLST completed in ED   Pt is admitted with     New onset atrial fibrillation  New onset CHF  Dyspnea  Troponin elevation 90.54,  possibly ischemic demand    - s/p lovenox 80 mg SQ in the ED, cont  - s/p metoprolol 25 mg in ED, cont BID  - s/p lasix 40 mg in the ED, cont Q day  - will hold lisinopril/HTZ to avoid hypotension and follow renal function, repeat troponin  - apprec cardiology consult  - echocardiogram  - DVT proph: on lovenox  - Full Code, MOLST completed in ED   Pt is admitted with     New onset atrial fibrillation  New onset CHF  Dyspnea  Troponin elevation 90.54,  possibly ischemic demand    - s/p lovenox 80 mg SQ in the ED, cont BID , switch to oral agent after discussion with cardiology  - s/p metoprolol 25 mg in ED, cont BID  - s/p lasix 40 mg in the ED, cont Q day  - will hold lisinopril/HTZ to avoid hypotension and follow renal function, repeat troponin  - apprec cardiology consult  - echocardiogram  - DVT proph: on lovenox  - Full Code, MOLST completed in ED

## 2023-02-24 NOTE — H&P ADULT - NSHPPHYSICALEXAM_GEN_ALL_CORE
ICU Vital Signs Last 24 Hrs  T(C): 36.7 (24 Feb 2023 14:35), Max: 36.7 (24 Feb 2023 14:35)  T(F): 98 (24 Feb 2023 14:35), Max: 98 (24 Feb 2023 14:35)  HR: 115 (24 Feb 2023 17:42) (87 - 115)  BP: 150/82 (24 Feb 2023 17:42) (119/85 - 150/82)  BP(mean): 98 (24 Feb 2023 17:42) (97 - 105)    RR: 16 (24 Feb 2023 17:42) (16 - 23)    SpO2: 98% (24 Feb 2023 17:42) (97% - 100%)    O2 Parameters below as of 24 Feb 2023 17:42  Patient On (Oxygen Delivery Method): room air

## 2023-02-24 NOTE — ED PROVIDER NOTE - OBJECTIVE STATEMENT
medical evaluation
88 yo male wPMHx of CHF, HTN presents to the ED sent by MD found to be in new onset afib c/o dyspnea on exertion over the past 2 weeks. Pt is on Lasix. Sx worsened over the past 2 weeks to the point its at rest. Pt with leg swelling bilateral. Denies cp, cough, abd pain, n/v/d.

## 2023-02-24 NOTE — H&P ADULT - CONVERSATION DETAILS
Pt is Full Code and would want a trial of IVF, antibiotics, feeding tube as needed.  He would like his daughter, Marjan Carrero RN to be his HCP.    Pt states he has a Living Will.        HUBER completed after discussion with pt and his daughter.

## 2023-02-24 NOTE — H&P ADULT - NSICDXPASTMEDICALHX_GEN_ALL_CORE_FT
PAST MEDICAL HISTORY:  HTN (hypertension)      PAST MEDICAL HISTORY:  History of BPH     HTN (hypertension)

## 2023-02-24 NOTE — REVIEW OF SYSTEMS
[Lower Ext Edema] : lower extremity edema [Shortness Of Breath] : shortness of breath [Dyspnea on Exertion] : dyspnea on exertion [Abdominal Pain] : abdominal pain [Fever] : no fever [Chills] : no chills [Chest Pain] : no chest pain [Palpitations] : no palpitations

## 2023-02-24 NOTE — CONSULT NOTE ADULT - PROBLEM SELECTOR RECOMMENDATION 2
New onset atrial fibrillation -- rate not controlled; start metoprolol 25 mg BID; Lovenox as initial anticoagulation strategy.

## 2023-02-24 NOTE — PHARMACOTHERAPY INTERVENTION NOTE - COMMENTS
medication history complete, reviewed medications with patient and confirmed with doctor first med hx.

## 2023-02-24 NOTE — ED ADULT NURSE NOTE - IN THE PAST 12 MONTHS HAVE YOU USED DRUGS OTHER THAN THOSE REQUIRED FOR MEDICAL REASON?
Physical Therapy Visit    Referred by: Nikita Rodriguez MD; Medical Diagnosis (from order):    Diagnosis Information      Diagnosis    715.16 (ICD-9-CM) - M17.11 (ICD-10-CM) - Primary osteoarthritis of right knee              Visit: 9    Visit Type: Daily Treatment Note    SUBJECTIVE                                                                                                               He states he continues to have pain at right knee at night when sleeping. His right knee feels better when he gets up and starts moving. He states mild throbbing pain at the right knee today. He states less pain overall at right knee during daily activities.   Functional Change: He presents to physical therapy without single point cane today and felt good walking from parking lot into clinic without use of cane. He states feeling like he can walk and stand longer periods without single point cane lately.     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 3 week post operative on 7/7/22.      Precautions:   History of seizure disorder when he was in high school (No E-STIM). Left total knee arthroplasty (1/25/22).     Today's Exercises:        passive range of motion to right knee   Seated hamstring stretch 2x30 seconds  Hooklying Clamshell with blue band x30   Right Gregory Stretch 2x30 seconds   Standing heel raises on 1/2 foam roll 3x10    Deferred this session:  Supine Heel Slide with Strap x15     Standing right hip abduction and extension with orange band 2x10 each    Standing gastroc stretch with 1/2 foam roll 2x30 seconds   Standing right hamstring curl x20     Hooklying Hip Adduction squeeze with Ball 20x3  seconds            Manual Therapy:  Effleurage to right knee. Soft tissue mobilization to right knee musculature. Right patellofemoral joint mobilizations. Cross friction scar mobilization.     Neuromuscular Re-Education:  Neuromuscular Re-education to improve quality of motion , improve posture and postural awareness, improve proprioception, improve balance, improve coordination  and improve kinesthetic sense:          Quad set 20x3 seconds      straight leg-raise 2x12  Long arc quadriceps 2# 2x10 -deferred this session   Lateral stepping with green band in gym x2 laps -deferred this session   Monster walks with green band in gym x2 laps -deferred this session   Anterior chain reaction at // bars x15 bilateral -deferred this session     Scifit x5 minutes    Tandem walking forward and retro at // bars x5 laps     Squats to chair with  1 airex 2x10  Step up 6 inch step at // bars 2x10    Lateral step up and over 6 inch 2x10  Noelle negotiation 6 inch at // bars forward and lateral with airex x5 laps each -deferred this session   Geomat 3 point 2x5 -deferred this session   Retro walking at speed pulley 4 plates x10 -deferred this session   Prone hang 3# x3 minutes  New:   Step down 3 inch 2x10    Future progressions:    Farmer carry   Retro walking at speed pulley x10    Skilled input: verbal instruction/cues and tactile instruction/cues    Writer verbally educated and received verbal consent for hand placement, positioning of patient, and techniques to be performed today from patient for clothing adjustments for techniques, therapist position for techniques and hand placement and palpation for techniques as described above and how they are pertinent to the patient's plan of care.    Home Exercise Program/Education Materials: Access Code: [email or web address]  URL: [email or web address]  Date: 06/20/2022  Prepared by: Boubacar Choi    Exercises  · Seated Ankle Pumps - 3 x daily - 7 x weekly - 1 sets -  20-30 reps  · Supine Quad Set - 2 x daily - 7 x weekly - 2 sets - 10 reps - 3 seconds hold  · Supine Knee Extension Stretch on Towel Roll - 2-3 x daily - 7 x weekly - 1 sets - 1 reps - 2-5 minutes hold  · Seated Knee Extension Stretch with Chair - 2-3 x daily - 7 x weekly - 1 sets - 1 reps - 2-5 hold  · Seated Hamstring Stretch - 2-3 x daily - 7 x weekly - 3 sets - 1 reps - 20-30 seconds hold  · Supine Heel Slide with Strap - 2-3 x daily - 7 x weekly - 1 sets - 10-15 reps - 3 seconds hold  · Supine Knee Extension Strengthening - 1 x daily - 7 x weekly - 1-2 sets - 10 reps - 3 seconds hold  · Seated Knee Flexion Extension AROM - 2 x daily - 7 x weekly - 1 sets - 10-15 reps  · Side to Side Weight Shift with Unilateral Counter Support - 2 x daily - 7 x weekly - 2 sets - 10 reps  · Standing Heel Raises - 1 x daily - 7 x weekly - 2-3 sets - 10 reps     7/5/2022   Exercises:  · Active Straight Leg Raise with Quad Set - 1 x daily - 5 x weekly - 2 sets - 10 reps  · Squat with Chair and Counter Support - 1 x daily - 3-5 x weekly - 2-3 sets - 10 reps  · Standing Marching - 1 x daily - 5 x weekly - 2 sets - 10 reps  · Forward Lunge with Back Leg Straight and Counter Support - 1 x daily - 5 x weekly - 2 sets - 10 reps          Cryotherapy ([id number]): Cold Pack  Location: affected area  Position: long sitting  Duration: 15 minutes    ASSESSMENT                                                                                                             Single steri strip at mid scar at right knee but otherwise good healing at surgical scar. Cross friction scar mobilization performed at superior and inferior scar to improve scar mobility and reduce elevation of scar tissue. Patient presented to physical therapy without single point cane this session with improving gait mechanics with wing and step length bilateral. Patient able to progress to more repetitions with squatting to chair with 1 airex pad with improving hip and  functional quadriceps strength. Added step down this session to improve eccentric right lower extremity control with cueing to reduce knee genu valgum during step down. Continued with prone hang at right knee at end of session to continue to improve terminal knee extension at right knee with good tolerance. Continue to progress closed kinetic chain strengthening and single leg stance activities next visit as tolerated.   Patient Education:   Results of above outlined education: Verbalizes understanding and Demonstrates understanding      PLAN                                                                                                                           Suggestions for next session as indicated: Continue plan of care to address restrictions identified in initial evaluation contributing to functional deficits including PT POC: activities of daily living, biomechanical training, home program, manual therapy, neuromuscular re-education, therapeutic activities and therapeutic exercise.            Therapy procedure time and total treatment time can be found documented on the Time Entry flowsheet   No

## 2023-02-24 NOTE — PHYSICAL EXAM
[No JVD] : no jugular venous distention [Non Tender] : non-tender [de-identified] : BP is slightly short of breath [de-identified] : Decreased breath sounds bilaterally, right greater than left [de-identified] : Irregular rhythm, tachycardic [de-identified] : Bipedal edema

## 2023-02-24 NOTE — CONSULT NOTE ADULT - PROBLEM SELECTOR RECOMMENDATION 9
New onset CHF in the setting or a new diagnosis of AF.  Await TTE to assess LV function.  Diurese with IV Lasix while monitoring renal function; would hold ACE-I initially given need for diuresis and mildly elevated creatinine.

## 2023-02-24 NOTE — CONSULT NOTE ADULT - SUBJECTIVE AND OBJECTIVE BOX
CHIEF COMPLAINT: Patient is a 89y old  Male who presents with a chief complaint of SOB    HPI: 89 year old man with a history of hypertension, BPH, and arthritis who presented to the ED after seeing his PCP (Dr Umanzor) earlier today because of shortness of breath.  He hasn't felt well for a few weeks -- predominant complaint was dyspnea that worsened with activity and alleviated with rest; associated with the onset of bilateral leg edema.  He reports no past history of heart disease. In the ED he was diagnosed with new-onset atrial fibrillation and CHF.  He is presently comfortable (at rest); denies orthopnea/weight gain/angina.    PAST MEDICAL & SURGICAL HISTORY:  HTN    SOCIAL HISTORY:   Alcohol: Denied  Smoking: Nonsmoker  Drug Use: Denied  Marital Status:     FAMILY HISTORY: No heart disease in 1st degree relatives    Home Medications:  aspirin 81 mg oral delayed release tablet: 1 tab(s) orally once a day (24 Feb 2023 16:21)  diclofenac sodium 75 mg oral delayed release tablet: 1 tab(s) orally 2 times a day (24 Feb 2023 16:21)  latanoprost 0.005% ophthalmic solution: 1 drop(s) to each affected eye once a day (in the evening) (24 Feb 2023 16:21)  lisinopril-hydrochlorothiazide 20 mg-12.5 mg oral tablet: 1 tab(s) orally once a day (24 Feb 2023 16:21)  Multiple Vitamins oral tablet: 1 tab(s) orally once a day (24 Feb 2023 16:21)  terazosin 10 mg oral capsule: 1 cap(s) orally once a day (at bedtime) (24 Feb 2023 16:21)  timolol hemihydrate 0.5% ophthalmic solution: 1 drop(s) to each affected eye 2 times a day (24 Feb 2023 16:21)    Allergies:  No Known Allergies    REVIEW OF SYSTEMS:  CONSTITUTIONAL: No fevers or chills  Eyes: No visual changes  NECK: No pain or stiffness  RESPIRATORY:  + shortness of breath, + pedal edema  CARDIOVASCULAR: No chest pain or palpitations  GASTROINTESTINAL: No abdominal pain.   GENITOURINARY: No dysuria, frequency or hematuria  NEUROLOGICAL: No numbness.  SKIN: No itching or rash  All other review of systems is negative unless indicated above    VITAL SIGNS:   Vital Signs Last 24 Hrs  T(C): 36.7 (24 Feb 2023 14:35), Max: 36.7 (24 Feb 2023 14:35)  T(F): 98 (24 Feb 2023 14:35), Max: 98 (24 Feb 2023 14:35)  HR: 109 (24 Feb 2023 16:10) (87 - 109)  BP: 119/85 (24 Feb 2023 16:10) (119/85 - 138/91)  BP(mean): 97 (24 Feb 2023 15:42) (97 - 105)  RR: 23 (24 Feb 2023 15:42) (20 - 23)  SpO2: 97% (24 Feb 2023 15:42) (97% - 100%)    PHYSICAL EXAM:  Constitutional: NAD, awake and alert, appears stated age  HEENT:   No oral cyanosis.  Pulmonary: Non-labored, breath sounds are clear bilaterally, No wheezing, rales or rhonchi  Cardiovascular: Irregular, normal S2, tachycardic (120-130s during exam)  Gastrointestinal: Bowel Sounds present, soft, nontender.   Lymph: Pitting b/l leg edema. No cervical lymphadenopathy.  Neurological: Alert, no focal deficits  Skin: No rashes.  Psych:  Mood & affect appropriate    LABS:                 11.2   4.78  )-----------( 125      ( 24 Feb 2023 15:20 )             35.6     139    |  109    |  55     ----------------------------<  110    4.2     |  25     |  1.32     Ca    9.3        24 Feb 2023 15:20  Mg     2.5       24 Feb 2023 15:20    TPro  7.0    /  Alb  3.6    /  TBili  1.0    /  DBili  x      /  AST  18     /  ALT  24     /  AlkPhos  82     24 Feb 2023 15:20    PT/INR - ( 24 Feb 2023 15:20 )   PT: 14.2 sec;   INR: 1.22 ratio    PTT - ( 24 Feb 2023 15:20 )  PTT:31.3 sec    Pro Bnp [id number]    ECG: AF, RVR, RBBB

## 2023-02-24 NOTE — ASSESSMENT
[FreeTextEntry1] : New onset atrial fibrillation with heart failure–his heart rate is elevated and he does appear somewhat short of breath.  Discussed the situation with the patient and his 2 daughters.  It was decided that the best course of action would be for him to go to the emergency room for further management and treatment.  They were told that this would be far too difficult and dangerous to try to manage from home.  They are all in agreement.

## 2023-02-24 NOTE — HEALTH RISK ASSESSMENT
[No] : No [No falls in past year] : Patient reported no falls in the past year [0] : 2) Feeling down, depressed, or hopeless: Not at all (0) [PHQ-2 Negative - No further assessment needed] : PHQ-2 Negative - No further assessment needed [Never] : Never [[id number]] : 0

## 2023-02-24 NOTE — ED PROVIDER NOTE - MUSCULOSKELETAL, MLM
Spine appears normal, range of motion is not limited, no muscle or joint tenderness, 2+ pitting edema lower extremities

## 2023-02-24 NOTE — ED ADULT TRIAGE NOTE - CHIEF COMPLAINT QUOTE
Pt arrives to ED sent by MD for CHF exacerbation and new onset afib. Hx CHF- on PO lasix. complains of shortness of breath on exertion.

## 2023-02-25 DIAGNOSIS — Z90.89 ACQUIRED ABSENCE OF OTHER ORGANS: Chronic | ICD-10-CM

## 2023-02-25 DIAGNOSIS — I31.39 OTHER PERICARDIAL EFFUSION (NONINFLAMMATORY): ICD-10-CM

## 2023-02-25 LAB
A1C WITH ESTIMATED AVERAGE GLUCOSE RESULT: 6.2 % — HIGH (ref 4–5.6)
ANION GAP SERPL CALC-SCNC: 5 MMOL/L — SIGNIFICANT CHANGE UP (ref 5–17)
BUN SERPL-MCNC: 54 MG/DL — HIGH (ref 7–23)
CALCIUM SERPL-MCNC: 9.5 MG/DL — SIGNIFICANT CHANGE UP (ref 8.5–10.1)
CHLORIDE SERPL-SCNC: 108 MMOL/L — SIGNIFICANT CHANGE UP (ref 96–108)
CHOLEST SERPL-MCNC: 158 MG/DL — SIGNIFICANT CHANGE UP
CO2 SERPL-SCNC: 27 MMOL/L — SIGNIFICANT CHANGE UP (ref 22–31)
CREAT SERPL-MCNC: 1.43 MG/DL — HIGH (ref 0.5–1.3)
EGFR: 47 ML/MIN/1.73M2 — LOW
ESTIMATED AVERAGE GLUCOSE: 131 MG/DL — HIGH (ref 68–114)
GLUCOSE SERPL-MCNC: 166 MG/DL — HIGH (ref 70–99)
HCT VFR BLD CALC: 33.6 % — LOW (ref 39–50)
HDLC SERPL-MCNC: 64 MG/DL — SIGNIFICANT CHANGE UP
HGB BLD-MCNC: 10.7 G/DL — LOW (ref 13–17)
LIPID PNL WITH DIRECT LDL SERPL: 80 MG/DL — SIGNIFICANT CHANGE UP
MCHC RBC-ENTMCNC: 29.4 PG — SIGNIFICANT CHANGE UP (ref 27–34)
MCHC RBC-ENTMCNC: 31.8 GM/DL — LOW (ref 32–36)
MCV RBC AUTO: 92.3 FL — SIGNIFICANT CHANGE UP (ref 80–100)
NON HDL CHOLESTEROL: 94 MG/DL — SIGNIFICANT CHANGE UP
PLATELET # BLD AUTO: 118 K/UL — LOW (ref 150–400)
POTASSIUM SERPL-MCNC: 4.3 MMOL/L — SIGNIFICANT CHANGE UP (ref 3.5–5.3)
POTASSIUM SERPL-SCNC: 4.3 MMOL/L — SIGNIFICANT CHANGE UP (ref 3.5–5.3)
RBC # BLD: 3.64 M/UL — LOW (ref 4.2–5.8)
RBC # FLD: 15.3 % — HIGH (ref 10.3–14.5)
SODIUM SERPL-SCNC: 140 MMOL/L — SIGNIFICANT CHANGE UP (ref 135–145)
TRIGL SERPL-MCNC: 69 MG/DL — SIGNIFICANT CHANGE UP
TROPONIN I, HIGH SENSITIVITY RESULT: 74.67 NG/L — SIGNIFICANT CHANGE UP
WBC # BLD: 4.08 K/UL — SIGNIFICANT CHANGE UP (ref 3.8–10.5)
WBC # FLD AUTO: 4.08 K/UL — SIGNIFICANT CHANGE UP (ref 3.8–10.5)

## 2023-02-25 PROCEDURE — 93306 TTE W/DOPPLER COMPLETE: CPT | Mod: 26

## 2023-02-25 PROCEDURE — 99233 SBSQ HOSP IP/OBS HIGH 50: CPT

## 2023-02-25 PROCEDURE — 93010 ELECTROCARDIOGRAM REPORT: CPT

## 2023-02-25 RX ORDER — LANOLIN ALCOHOL/MO/W.PET/CERES
5 CREAM (GRAM) TOPICAL AT BEDTIME
Refills: 0 | Status: DISCONTINUED | OUTPATIENT
Start: 2023-02-25 | End: 2023-03-07

## 2023-02-25 RX ORDER — ENOXAPARIN SODIUM 100 MG/ML
80 INJECTION SUBCUTANEOUS EVERY 12 HOURS
Refills: 0 | Status: DISCONTINUED | OUTPATIENT
Start: 2023-02-25 | End: 2023-02-26

## 2023-02-25 RX ORDER — METOPROLOL TARTRATE 50 MG
25 TABLET ORAL EVERY 8 HOURS
Refills: 0 | Status: ACTIVE | OUTPATIENT
Start: 2023-02-25 | End: 2024-01-24

## 2023-02-25 RX ADMIN — Medication 81 MILLIGRAM(S): at 09:59

## 2023-02-25 RX ADMIN — Medication 1 DROP(S): at 05:25

## 2023-02-25 RX ADMIN — ENOXAPARIN SODIUM 80 MILLIGRAM(S): 100 INJECTION SUBCUTANEOUS at 09:59

## 2023-02-25 RX ADMIN — Medication 25 MILLIGRAM(S): at 13:27

## 2023-02-25 RX ADMIN — TAMSULOSIN HYDROCHLORIDE 0.8 MILLIGRAM(S): 0.4 CAPSULE ORAL at 20:20

## 2023-02-25 RX ADMIN — Medication 25 MILLIGRAM(S): at 20:19

## 2023-02-25 RX ADMIN — Medication 5 MILLIGRAM(S): at 20:19

## 2023-02-25 RX ADMIN — Medication 1 TABLET(S): at 09:59

## 2023-02-25 RX ADMIN — ENOXAPARIN SODIUM 80 MILLIGRAM(S): 100 INJECTION SUBCUTANEOUS at 20:21

## 2023-02-25 RX ADMIN — Medication 40 MILLIGRAM(S): at 05:24

## 2023-02-25 RX ADMIN — LATANOPROST 1 DROP(S): 0.05 SOLUTION/ DROPS OPHTHALMIC; TOPICAL at 20:21

## 2023-02-25 RX ADMIN — Medication 1 DROP(S): at 17:44

## 2023-02-25 NOTE — PROGRESS NOTE ADULT - SUBJECTIVE AND OBJECTIVE BOX
CHIEF COMPLAINT: Patient is a 89y old  Male who presents with a chief complaint of New onset atrial fibrillation  New onset CHF  Troponin elevation (24 Feb 2023 18:04)      HPI:  Pt is a pleasant 88 yo male w/ PMHx of CHF, HTN on lisinopril/HTZ,  BPH  who was sent by his doctor, Dr. Umanzor  to  [facility] ED  with complaint of  increasing dyspnea on exertion, dizziness and new onset afib.   Pt reported dyspnea over the last 2 weeks.   He now also has dyspnea  at rest and has noticed increased leg swelling, as noted by his daughters here at bedside in the ED.   Pt has not been on lasix.     Pt denies cp, cough, no abd pain, no n/v/d, no night time orthopnea or PND,  no palpitations, no f/c, no resp or urinary complaints.       (24 Feb 2023 18:04)      PAST MEDICAL / SURGICAL HISTORY:  PAST MEDICAL & SURGICAL HISTORY:  HTN (hypertension)      History of BPH      History of mastoidectomy          SOCIAL HISTORY:   Alcohol: Denied  Smoking: Nonsmoker  Drug Use: Denied  Marital Status:     FAMILY HISTORY: FAMILY HISTORY:  FH: type 2 diabetes mellitus (Father)    Family hx of hypertension (Mother)    Family history of hypertension (Father)        MEDICATIONS  (STANDING):  aspirin enteric coated 81 milliGRAM(s) Oral daily  enoxaparin Injectable 80 milliGRAM(s) SubCutaneous every 12 hours  furosemide   Injectable 40 milliGRAM(s) IV Push daily  latanoprost 0.005% Ophthalmic Solution 1 Drop(s) Both EYES at bedtime  metoprolol tartrate 25 milliGRAM(s) Oral every 12 hours  multivitamin 1 Tablet(s) Oral daily  tamsulosin 0.8 milliGRAM(s) Oral at bedtime  timolol 0.5% Solution 1 Drop(s) Both EYES two times a day    MEDICATIONS  (PRN):  diclofenac 75 milliGRAM(s) Oral two times a day PRN Moderate Pain (4 - 6)      Allergies    No Known Allergies    Intolerances        REVIEW OF SYSTEMS:  CONSTITUTIONAL: No weakness, fevers or chills  Eyes: No visual changes  NECK: No pain or stiffness  RESPIRATORY: No cough, wheezing, hemoptysis; No shortness of breath  CARDIOVASCULAR: No chest pain or palpitations  GASTROINTESTINAL: No abdominal pain. No nausea, vomiting, or hematemesis; No diarrhea or constipation. No melena or hematochezia.  GENITOURINARY: No dysuria, frequency or hematuria  NEUROLOGICAL: No numbness.  SKIN: No itching or rash  All other review of systems is negative unless indicated above    VITAL SIGNS:   Vital Signs Last 24 Hrs  T(C): 36.4 (25 Feb 2023 08:30), Max: 36.7 (24 Feb 2023 14:35)  T(F): 97.5 (25 Feb 2023 08:30), Max: 98 (24 Feb 2023 14:35)  HR: 99 (25 Feb 2023 08:30) (87 - 130)  BP: 131/86 (25 Feb 2023 08:30) (112/88 - 150/82)  BP(mean): 98 (24 Feb 2023 17:42) (97 - 105)  RR: 22 (25 Feb 2023 08:30) (16 - 24)  SpO2: 99% (25 Feb 2023 08:30) (97% - 100%)    Parameters below as of 25 Feb 2023 08:30  Patient On (Oxygen Delivery Method): room air        I&O's Summary    24 Feb 2023 07:01  -  25 Feb 2023 07:00  --------------------------------------------------------  IN: 0 mL / OUT: 700 mL / NET: -700 mL    25 Feb 2023 07:01  -  25 Feb 2023 08:34  --------------------------------------------------------  IN: 0 mL / OUT: 550 mL / NET: -550 mL        PHYSICAL EXAM:  Constitutional: NAD, awake and alert  HEENT:  EOMI,  Pupils round, No oral cyanosis.  Pulmonary: Non-labored, breath sounds are clear bilaterally, No wheezing, rales or rhonchi  Cardiovascular: S1 and S2, regular rate and rhythm, no Murmurs, gallops or rubs  Gastrointestinal: Bowel Sounds present, soft, nontender.   Lymph: No peripheral edema. No cervical lymphadenopathy.  Neurological: Alert, no focal deficits  Skin: No rashes.  Psych:  Mood & affect appropriate    LABS:             10.7   4.08  )-----------( 118      ( 25 Feb 2023 06:28 )             33.6                  140    |  108    |  54     ----------------------------<  166    4.3     |  27     |  1.43     Ca    9.5        25 Feb 2023 06:28    TPro  7.0    /  Alb  3.6    /  TBili  1.0    /  DBili  x      /  AST  18     /  ALT  24     /  AlkPhos  82     24 Feb 2023 15:20    PT/INR - ( 24 Feb 2023 15:20 )   PT: 14.2 sec;   INR: 1.22 ratio    PTT - ( 24 Feb 2023 15:20 )  PTT:31.3 sec    Pro Bnp [id number]    CT Angio Chest PE Protocol w/ IV Cont (02.24.23 @ 16:57):  No evidence of central, lobar, or segmental pulmonary embolism.  Cardiomegaly with a small-moderate circumferential pericardial effusion. Correlation with echocardiogram is suggested.  Moderate right and small left pleural effusions.    Tele: AF, Freq PVCs

## 2023-02-25 NOTE — PROGRESS NOTE ADULT - PROBLEM SELECTOR PLAN 1
No prior history of CHF -- presumably CHF is related to new onset AF; await TTE.  Continue to diurese with IV Lasix -- will need to monitor renal function since he received contrast in ED for CT-PA.

## 2023-02-25 NOTE — PROGRESS NOTE ADULT - SUBJECTIVE AND OBJECTIVE BOX
History of Present Illness:   Pt is a pleasant 89 yo male w/ PMHx of CHF, HTN on lisinopril/HTZ,  BPH  who was sent by his doctor, Dr. Umanzor  to  [facility] ED  with complaint of  increasing dyspnea on exertion, dizziness and new onset afib.   Pt reported dyspnea over the last 2 weeks.   He now also has dyspnea  at rest and has noticed increased leg swelling, as noted by his daughters here at bedside in the ED.   Pt has not been on lasix.     2.25: no dyspnea at rest, no cp            REVIEW OF SYSTEMS:    CONSTITUTIONAL: No weakness, No fevers or chills  ENT: No ear ache, No sorethroat  NECK: No pain, No stiffness  RESPIRATORY: No cough, No wheezing, No hemoptysis; No dyspnea  CARDIOVASCULAR: No chest pain, No palpitations  GASTROINTESTINAL: No abd pain, No nausea, No vomiting, No hematemesis, No diarrhea or constipation. No melena, No hematochezia.  GENITOURINARY: No dysuria, No  hematuria  NEUROLOGICAL: No diplopia, No paresthesia, No motor dysfunction  MUSCULOSKELETAL: No arthralgia, No myalgia  SKIN: No rashes, or lesions   PSYCH: no anxiety, no suicidal ideation    All other review of systems is negative unless indicated above    Vital Signs Last 24 Hrs  T(C): 36.5 (25 Feb 2023 13:10), Max: 36.5 (25 Feb 2023 13:10)  T(F): 97.7 (25 Feb 2023 13:10), Max: 97.7 (25 Feb 2023 13:10)  HR: 88 (25 Feb 2023 13:10) (88 - 130)  BP: 130/80 (25 Feb 2023 13:10) (112/88 - 135/84)  BP(mean): --  RR: 22 (25 Feb 2023 13:10) (21 - 24)  SpO2: 99% (25 Feb 2023 13:10) (99% - 100%)    Parameters below as of 25 Feb 2023 13:10  Patient On (Oxygen Delivery Method): room air        PHYSICAL EXAM:    GENERAL: NAD  HEENT:  NC/AT, EOMI, PERRLA, No scleral icterus, Moist mucous membranes  NECK: Supple, No JVD  CNS:  Alert & Oriented X3, Motor Strength 5/5 B/L upper and lower extremities; DTRs 2+ intact   LUNG: Normal Breath sounds, Clear to auscultation bilaterally, No rales, No rhonchi, No wheezing  HEART: RRR; No murmurs, No rubs  ABDOMEN: +BS, ST/ND/NT  GENITOURINARY: Voiding, Bladder not distended  EXTREMITIES:  2+ Peripheral Pulses, No clubbing, No cyanosis, No tibial edema  MUSCULOSKELTAL: Joints normal ROM, No TTP, No effusion  VAGINAL: deferred  SKIN: no rashes  RECTAL: deferred, not indicated  BREAST: deferred                          10.7   4.08  )-----------( 118      ( 25 Feb 2023 06:28 )             33.6     02-25    140  |  108  |  54<H>  ----------------------------<  166<H>  4.3   |  27  |  1.43<H>    Ca    9.5      25 Feb 2023 06:28  Mg     2.5     02-24    TPro  7.0  /  Alb  3.6  /  TBili  1.0  /  DBili  x   /  AST  18  /  ALT  24  /  AlkPhos  82  02-24    Vancomycin levels:   Cultures:     MEDICATIONS  (STANDING):  aspirin enteric coated 81 milliGRAM(s) Oral daily  enoxaparin Injectable 80 milliGRAM(s) SubCutaneous every 12 hours  furosemide   Injectable 40 milliGRAM(s) IV Push daily  latanoprost 0.005% Ophthalmic Solution 1 Drop(s) Both EYES at bedtime  metoprolol tartrate 25 milliGRAM(s) Oral every 8 hours  multivitamin 1 Tablet(s) Oral daily  tamsulosin 0.8 milliGRAM(s) Oral at bedtime  timolol 0.5% Solution 1 Drop(s) Both EYES two times a day    MEDICATIONS  (PRN):  diclofenac 75 milliGRAM(s) Oral two times a day PRN Moderate Pain (4 - 6)      all labs reviewed  all imaging reviewed        Assessment:  	    1. New onset atrial fibrillation  rate controlled  c/w BB, ASA, Lovenox 80mg Bid    2. Acute systolic Left  CHF  c/w diuretic Iv LASIX   F/U TTE  Cardiology evaluation     3. Elevated Troponin:  r/o ACS

## 2023-02-25 NOTE — PROGRESS NOTE ADULT - PROBLEM SELECTOR PLAN 2
HR has improved but not optimal; increase frequency of metoprolol to q8hr; may change Lovenox to Eliquis after echo if no severe valve disease / LV dysfunction.

## 2023-02-26 LAB
ANION GAP SERPL CALC-SCNC: 5 MMOL/L — SIGNIFICANT CHANGE UP (ref 5–17)
BUN SERPL-MCNC: 57 MG/DL — HIGH (ref 7–23)
CALCIUM SERPL-MCNC: 9.4 MG/DL — SIGNIFICANT CHANGE UP (ref 8.5–10.1)
CHLORIDE SERPL-SCNC: 110 MMOL/L — HIGH (ref 96–108)
CO2 SERPL-SCNC: 25 MMOL/L — SIGNIFICANT CHANGE UP (ref 22–31)
CREAT SERPL-MCNC: 1.43 MG/DL — HIGH (ref 0.5–1.3)
EGFR: 47 ML/MIN/1.73M2 — LOW
GLUCOSE SERPL-MCNC: 115 MG/DL — HIGH (ref 70–99)
OB PNL STL: NEGATIVE — SIGNIFICANT CHANGE UP
POTASSIUM SERPL-MCNC: 3.9 MMOL/L — SIGNIFICANT CHANGE UP (ref 3.5–5.3)
POTASSIUM SERPL-SCNC: 3.9 MMOL/L — SIGNIFICANT CHANGE UP (ref 3.5–5.3)
SODIUM SERPL-SCNC: 140 MMOL/L — SIGNIFICANT CHANGE UP (ref 135–145)

## 2023-02-26 PROCEDURE — 99233 SBSQ HOSP IP/OBS HIGH 50: CPT

## 2023-02-26 RX ORDER — FUROSEMIDE 40 MG
40 TABLET ORAL DAILY
Refills: 0 | Status: DISCONTINUED | OUTPATIENT
Start: 2023-02-26 | End: 2023-03-07

## 2023-02-26 RX ORDER — HEPARIN SODIUM 5000 [USP'U]/ML
5000 INJECTION INTRAVENOUS; SUBCUTANEOUS EVERY 12 HOURS
Refills: 0 | Status: DISCONTINUED | OUTPATIENT
Start: 2023-02-26 | End: 2023-03-02

## 2023-02-26 RX ADMIN — LATANOPROST 1 DROP(S): 0.05 SOLUTION/ DROPS OPHTHALMIC; TOPICAL at 22:11

## 2023-02-26 RX ADMIN — TAMSULOSIN HYDROCHLORIDE 0.8 MILLIGRAM(S): 0.4 CAPSULE ORAL at 22:10

## 2023-02-26 RX ADMIN — Medication 25 MILLIGRAM(S): at 05:38

## 2023-02-26 RX ADMIN — Medication 40 MILLIGRAM(S): at 12:25

## 2023-02-26 RX ADMIN — Medication 25 MILLIGRAM(S): at 22:09

## 2023-02-26 RX ADMIN — Medication 81 MILLIGRAM(S): at 10:42

## 2023-02-26 RX ADMIN — Medication 1 DROP(S): at 05:38

## 2023-02-26 RX ADMIN — Medication 1 TABLET(S): at 10:42

## 2023-02-26 RX ADMIN — Medication 40 MILLIGRAM(S): at 05:38

## 2023-02-26 RX ADMIN — HEPARIN SODIUM 5000 UNIT(S): 5000 INJECTION INTRAVENOUS; SUBCUTANEOUS at 22:02

## 2023-02-26 RX ADMIN — Medication 25 MILLIGRAM(S): at 13:45

## 2023-02-26 RX ADMIN — Medication 5 MILLIGRAM(S): at 22:02

## 2023-02-26 RX ADMIN — Medication 1 DROP(S): at 17:20

## 2023-02-26 NOTE — PROGRESS NOTE ADULT - PROBLEM SELECTOR PLAN 1
Rate suboptimally controlled (tachycardic with exertion; occasional pauses) and in the setting of moderate LV systolic dysfunction; continue metoprolol; favor GIRMA/DCCV this week in effort to restore SR

## 2023-02-26 NOTE — PROGRESS NOTE ADULT - SUBJECTIVE AND OBJECTIVE BOX
History of Present Illness:   Pt is a pleasant 88 yo male w/ PMHx of CHF, HTN on lisinopril/HTZ,  BPH  who was sent by his doctor, Dr. Umanzor  to  [facility] ED  with complaint of  increasing dyspnea on exertion, dizziness and new onset afib.   Pt reported dyspnea over the last 2 weeks.   He now also has dyspnea  at rest and has noticed increased leg swelling, as noted by his daughters here at bedside in the ED.   Pt has not been on lasix.     2.25: no dyspnea at rest, no cp  2.26: +LE swelling, no dyspnea at rest  Tele: Afib             REVIEW OF SYSTEMS:    CONSTITUTIONAL: No weakness, No fevers or chills  ENT: No ear ache, No sorethroat  NECK: No pain, No stiffness  RESPIRATORY: No cough, No wheezing, No hemoptysis; No dyspnea  CARDIOVASCULAR: No chest pain, No palpitations  GASTROINTESTINAL: No abd pain, No nausea, No vomiting, No hematemesis, No diarrhea or constipation. No melena, No hematochezia.  GENITOURINARY: No dysuria, No  hematuria  NEUROLOGICAL: No diplopia, No paresthesia, No motor dysfunction  MUSCULOSKELETAL: No arthralgia, No myalgia  SKIN: No rashes, or lesions   PSYCH: no anxiety, no suicidal ideation    All other review of systems is negative unless indicated above    Vital Signs Last 24 Hrs  T(C): 36.2 (26 Feb 2023 07:34), Max: 36.8 (25 Feb 2023 20:15)  T(F): 97.2 (26 Feb 2023 07:34), Max: 98.2 (25 Feb 2023 20:15)  HR: 98 (26 Feb 2023 13:44) (83 - 102)  BP: 127/99 (26 Feb 2023 13:44) (100/78 - 151/98)  BP(mean): 106 (26 Feb 2023 13:44) (106 - 106)  RR: 16 (26 Feb 2023 13:44) (16 - 18)  SpO2: 97% (26 Feb 2023 13:44) (97% - 98%)    Parameters below as of 26 Feb 2023 13:44  Patient On (Oxygen Delivery Method): room air            PHYSICAL EXAM:    GENERAL: NAD  HEENT:  NC/AT, EOMI, PERRLA, No scleral icterus, Moist mucous membranes  NECK: Supple, No JVD  CNS:  Alert & Oriented X3, Motor Strength 5/5 B/L upper and lower extremities; DTRs 2+ intact   LUNG: Normal Breath sounds, Clear to auscultation bilaterally, No rales, No rhonchi, No wheezing  HEART: irregular ; No murmurs, No rubs  ABDOMEN: +BS, ST/ND/NT  GENITOURINARY: Voiding, Bladder not distended  EXTREMITIES:  2+ Peripheral Pulses, No clubbing, No cyanosis, No tibial edema  MUSCULOSKELTAL: Joints normal ROM, No TTP, No effusion  SKIN: no rashes  RECTAL: deferred, not indicated  BREAST: deferred               Labs:                        10.7   4.08  )-----------( 118      ( 25 Feb 2023 06:28 )             33.6     02-26    140  |  110<H>  |  57<H>  ----------------------------<  115<H>  3.9   |  25  |  1.43<H>    Ca    9.4      26 Feb 2023 06:48             Cultures:     MEDICATIONS  (STANDING):  aspirin enteric coated 81 milliGRAM(s) Oral daily  enoxaparin Injectable 80 milliGRAM(s) SubCutaneous every 12 hours  furosemide   Injectable 40 milliGRAM(s) IV Push daily  latanoprost 0.005% Ophthalmic Solution 1 Drop(s) Both EYES at bedtime  metoprolol tartrate 25 milliGRAM(s) Oral every 8 hours  multivitamin 1 Tablet(s) Oral daily  tamsulosin 0.8 milliGRAM(s) Oral at bedtime  timolol 0.5% Solution 1 Drop(s) Both EYES two times a day    MEDICATIONS  (PRN):  diclofenac 75 milliGRAM(s) Oral two times a day PRN Moderate Pain (4 - 6)      all labs reviewed  all imaging reviewed        Assessment:  	    1. New onset atrial fibrillation  rate controlled  c/w BB, ASA, Lovenox 80mg Bid  for DCCV     2. Acute systolic Left  CHF  c/w diuretic Iv LASIX   F/U TTE: EF mod decreased   Cardiology evaluation noted  for cardiac cath     3. Elevated Troponin:  not ACS

## 2023-02-26 NOTE — PROGRESS NOTE ADULT - PROBLEM SELECTOR PLAN 2
Moderate LV dysfunction with segmental wall motion abnormalities on echo raising suspicion for underlying CAD; L heart cath this week - timing depends on renal function; hold Lovenox today.

## 2023-02-26 NOTE — PROGRESS NOTE ADULT - SUBJECTIVE AND OBJECTIVE BOX
REASON FOR VISIT: AF, CHF    HPI: 89 year old man with a history of hypertension, BPH, and arthritis admitted on 2/24/23 with new diagnosis of atrial fibrillation and CHF.    2/26/23:  Fatigue and exertional dyspnea.    MEDICATIONS  (STANDING):  aspirin enteric coated 81 milliGRAM(s) Oral daily  enoxaparin Injectable 80 milliGRAM(s) SubCutaneous every 12 hours  furosemide   Injectable 40 milliGRAM(s) IV Push daily  latanoprost 0.005% Ophthalmic Solution 1 Drop(s) Both EYES at bedtime  melatonin 5 milliGRAM(s) Oral at bedtime  metoprolol tartrate 25 milliGRAM(s) Oral every 8 hours  multivitamin 1 Tablet(s) Oral daily  tamsulosin 0.8 milliGRAM(s) Oral at bedtime  timolol 0.5% Solution 1 Drop(s) Both EYES two times a day    Vital Signs Last 24 Hrs  T(C): 36.2 (26 Feb 2023 07:34), Max: 36.8 (25 Feb 2023 20:15)  T(F): 97.2 (26 Feb 2023 07:34), Max: 98.2 (25 Feb 2023 20:15)  HR: 83 (26 Feb 2023 07:34) (83 - 99)  BP: 103/77 (26 Feb 2023 07:34) (103/77 - 151/98)  RR: 18 (26 Feb 2023 07:34) (18 - 22)  SpO2: 98% (26 Feb 2023 07:34) (98% - 99%    PHYSICAL EXAM:  Constitutional: NAD, awake and alert, seated in bedside chair, appears stated age, overweight  Eyes:  EOMI,  Pupils round, No oral cyanosis.  Pulmonary: Non-labored, breath sounds are clear bilaterally  Cardiovascular: Irregularly irregular  Gastrointestinal: Bowel Sounds present, soft, nontender.   Lymph: 1+ pitting leg edema.   Psych:  Mood & affect appropriate    LABS:                       10.7   4.08  )-----------( 118      ( 25 Feb 2023 06:28 )             33.6           140    |  110    |  57     ----------------------------<  115    3.9     |  25     |  1.43     TroponinI hsT: 74.67, 90.54  2/24/23:  Serum Pro-Brain Natriuretic Peptide: 96570 pg/mL   TTE Echo Complete w/o Contrast w/ Doppler (02.25.23 @ 08:52):   The mitral valve leaflets appear thickened. Moderate (2+) mitral regurgitation.   Mild aortic sclerosis is present with normal valvular opening. Trace aortic regurgitation.   Moderate to severe (3+) tricuspid valve regurgitation.   Severe pulmonary hypertension.   The left atrium is mildly dilated.   Left ventricle systolic function appears impaired; segmental wall motion abnormalities noted. Estimated Ejection Fraction is 35- 40%. Mild concentric left ventricular hypertrophy.   The IVC is dilated with decreased respiratory variation.    Tele:  AF with frequent PVCs; occasional pauses

## 2023-02-27 DIAGNOSIS — I50.23 ACUTE ON CHRONIC SYSTOLIC (CONGESTIVE) HEART FAILURE: ICD-10-CM

## 2023-02-27 LAB
ANION GAP SERPL CALC-SCNC: 6 MMOL/L — SIGNIFICANT CHANGE UP (ref 5–17)
BUN SERPL-MCNC: 66 MG/DL — HIGH (ref 7–23)
CALCIUM SERPL-MCNC: 9.1 MG/DL — SIGNIFICANT CHANGE UP (ref 8.5–10.1)
CHLORIDE SERPL-SCNC: 107 MMOL/L — SIGNIFICANT CHANGE UP (ref 96–108)
CO2 SERPL-SCNC: 25 MMOL/L — SIGNIFICANT CHANGE UP (ref 22–31)
CREAT SERPL-MCNC: 1.69 MG/DL — HIGH (ref 0.5–1.3)
EGFR: 38 ML/MIN/1.73M2 — LOW
GLUCOSE SERPL-MCNC: 115 MG/DL — HIGH (ref 70–99)
POTASSIUM SERPL-MCNC: 3.8 MMOL/L — SIGNIFICANT CHANGE UP (ref 3.5–5.3)
POTASSIUM SERPL-SCNC: 3.8 MMOL/L — SIGNIFICANT CHANGE UP (ref 3.5–5.3)
SARS-COV-2 RNA SPEC QL NAA+PROBE: DETECTED
SODIUM SERPL-SCNC: 138 MMOL/L — SIGNIFICANT CHANGE UP (ref 135–145)

## 2023-02-27 PROCEDURE — 99233 SBSQ HOSP IP/OBS HIGH 50: CPT

## 2023-02-27 RX ADMIN — Medication 5 MILLIGRAM(S): at 21:27

## 2023-02-27 RX ADMIN — HEPARIN SODIUM 5000 UNIT(S): 5000 INJECTION INTRAVENOUS; SUBCUTANEOUS at 21:26

## 2023-02-27 RX ADMIN — Medication 81 MILLIGRAM(S): at 05:29

## 2023-02-27 RX ADMIN — LATANOPROST 1 DROP(S): 0.05 SOLUTION/ DROPS OPHTHALMIC; TOPICAL at 21:27

## 2023-02-27 RX ADMIN — Medication 25 MILLIGRAM(S): at 21:27

## 2023-02-27 RX ADMIN — Medication 1 DROP(S): at 18:21

## 2023-02-27 RX ADMIN — Medication 1 TABLET(S): at 13:31

## 2023-02-27 RX ADMIN — Medication 40 MILLIGRAM(S): at 13:31

## 2023-02-27 RX ADMIN — TAMSULOSIN HYDROCHLORIDE 0.8 MILLIGRAM(S): 0.4 CAPSULE ORAL at 21:27

## 2023-02-27 RX ADMIN — Medication 25 MILLIGRAM(S): at 05:29

## 2023-02-27 RX ADMIN — Medication 25 MILLIGRAM(S): at 13:33

## 2023-02-27 RX ADMIN — Medication 1 DROP(S): at 05:28

## 2023-02-27 RX ADMIN — HEPARIN SODIUM 5000 UNIT(S): 5000 INJECTION INTRAVENOUS; SUBCUTANEOUS at 13:31

## 2023-02-27 NOTE — CHART NOTE - NSCHARTNOTEFT_GEN_A_CORE
notified by nurse for fall incident.    Evaluated pt at bedside. Pt was trying to get up by himself, refused to wait for staff to help. Unsteady gait then fell, sliding down with back against the wall slowly, did not hit head, no loss of consciousness. Denies any pain at the moment.    Currently on 1:1 to prevent any fall incident. notified by nurse for fall incident.    Evaluated pt at bedside. Pt was trying to get up by himself, refused to wait for staff to help. Unsteady gait then fell, sliding down with back against the wall slowly, did not hit head, no loss of consciousness. Pt could clearly verbalize what happened. Denies any pain at the moment. Does not suspect injury at this moment. Consider imaging if discomfort arises.     Currently on 1:1 to prevent any fall incident.    Called daughter, Ina to update what happened. All questions/ concerns addressed.

## 2023-02-27 NOTE — PROGRESS NOTE ADULT - SUBJECTIVE AND OBJECTIVE BOX
CHIEF COMPLAINT: Patient is a 89y old  Male who presents with a chief complaint of SOB    HPI: 89 year old man with a history of hypertension, BPH, and arthritis who presented to the ED after seeing his PCP (Dr Umanzor) earlier today because of shortness of breath.  He hasn't felt well for a few weeks -- predominant complaint was dyspnea that worsened with activity and alleviated with rest; associated with the onset of bilateral leg edema.  He reports no past history of heart disease. In the ED he was diagnosed with new-onset atrial fibrillation and CHF.  He is presently comfortable (at rest); denies orthopnea/weight gain/angina.    3/27/23: no complaints, Tele: Afib , npo p midnight for [facility] tomorrow      MEDICATIONS  (STANDING):  aspirin enteric coated 81 milliGRAM(s) Oral daily  furosemide    Tablet 40 milliGRAM(s) Oral daily  heparin   Injectable 5000 Unit(s) SubCutaneous every 12 hours  latanoprost 0.005% Ophthalmic Solution 1 Drop(s) Both EYES at bedtime  melatonin 5 milliGRAM(s) Oral at bedtime  metoprolol tartrate 25 milliGRAM(s) Oral every 8 hours  multivitamin 1 Tablet(s) Oral daily  tamsulosin 0.8 milliGRAM(s) Oral at bedtime  timolol 0.5% Solution 1 Drop(s) Both EYES two times a day    Vital Signs Last 24 Hrs  T(C): 36.2 (27 Feb 2023 07:36), Max: 36.5 (26 Feb 2023 22:05)  T(F): 97.2 (27 Feb 2023 07:36), Max: 97.7 (26 Feb 2023 22:05)  HR: 94 (27 Feb 2023 13:19) (68 - 98)  BP: 125/77 (27 Feb 2023 13:19) (116/76 - 156/77)  BP(mean): 85 (27 Feb 2023 13:19) (85 - 85)  RR: 16 (27 Feb 2023 13:19) (16 - 18)  SpO2: 99% (27 Feb 2023 13:19) (92% - 99%)    Parameters below as of 27 Feb 2023 13:19  Patient On (Oxygen Delivery Method): room air        PHYSICAL EXAM:  Constitutional: NAD, awake and alert, appears stated age  HEENT:   No oral cyanosis.  Pulmonary: Non-labored, breath sounds are clear bilaterally, No wheezing, rales or rhonchi  Cardiovascular: Irregular, normal S2, tachycardic (120-130s during exam)  Gastrointestinal: Bowel Sounds present, soft, nontender.   Lymph: Pitting b/l leg edema. No cervical lymphadenopathy.  Neurological: Alert, no focal deficits  Skin: No rashes.  Psych:  Mood & affect appropriate    LABS:                 11.2   4.78  )-----------( 125      ( 24 Feb 2023 15:20 )             35.6     139    |  109    |  55     ----------------------------<  110    4.2     |  25     |  1.32     Ca    9.3        24 Feb 2023 15:20  Mg     2.5       24 Feb 2023 15:20    TPro  7.0    /  Alb  3.6    /  TBili  1.0    /  DBili  x      /  AST  18     /  ALT  24     /  AlkPhos  82     24 Feb 2023 15:20    PT/INR - ( 24 Feb 2023 15:20 )   PT: 14.2 sec;   INR: 1.22 ratio    PTT - ( 24 Feb 2023 15:20 )  PTT:31.3 sec    Pro Bnp [id number]    ECG: AF, RVR, RBBB      < from: TTE Echo Complete w/o Contrast w/ Doppler (02.25.23 @ 08:52) >   Impression     Summary     The mitral valve leaflets appear thickened.   Moderate (2+) mitral regurgitation ispresent.   Mild aortic sclerosis is present with normal valvular opening.   Trace aortic regurgitation is present.   Moderate to severe (3+) tricuspid valve regurgitation is present.   Severe pulmonary hypertension.   The left atrium is mildly dilated.   Left ventricle systolic function appears impaired; segmental wall motion   abnormalities noted. Estimated Ejection Fraction is 35- 40%.   Mild concentric left ventricular hypertrophy is present.   The IVC is dilated with decreased respiratory variation.     Signature     ----------------------------------------------------------------   Electronically signed by Thad Camejo MD(Interpreting    < end of copied text >

## 2023-02-27 NOTE — PROGRESS NOTE ADULT - PROBLEM SELECTOR PLAN 2
Moderate LV dysfunction 35-40% with segmental wall motion abnormalities on echo raising suspicion for underlying CAD; elevated CFL=[id number]  npo p midnight for LHC tomorrow - Family agrees  GDMT limited by CKD - cont. BB  cont. diuresis with lasix 40 mg ivp daily, daily weight, strict I&Os, fluid restriction 2L/day

## 2023-02-27 NOTE — PROVIDER CONTACT NOTE (FALL NOTIFICATION) - BACKGROUND
Pt forgetful at times. Pt left in chair in chair alarm on, non-skid socks on, and call bell within reach. Pt door closed for isolation precautions. Pt fell getting up to go to the bathroom unassisted.

## 2023-02-27 NOTE — PROVIDER CONTACT NOTE (FALL NOTIFICATION) - ASSESSMENT
Chair alarm sounded but not heard through door. VSS. negative head strike. Pt denies pain. Safety and fall precautions in place.

## 2023-02-27 NOTE — PROGRESS NOTE ADULT - SUBJECTIVE AND OBJECTIVE BOX
History of Present Illness:   Pt is a pleasant 89 yo male w/ PMHx of CHF, HTN on lisinopril/HTZ,  BPH  who was sent by his doctor, Dr. Umanzor  to  [place] ED  with complaint of  increasing dyspnea on exertion, dizziness and new onset afib.   Pt reported dyspnea over the last 2 weeks.   He now also has dyspnea  at rest and has noticed increased leg swelling, as noted by his daughters here at bedside in the ED.   Pt has not been on lasix.     2.25: no dyspnea at rest, no cp  2.26: +LE swelling, no dyspnea at rest  Tele: Afib   2.27: LE edema persists, Tele: Afib   no cp, no dyspnea at rest but present with minimal exertion             REVIEW OF SYSTEMS:    CONSTITUTIONAL: No weakness, No fevers or chills  ENT: No ear ache, No sorethroat  NECK: No pain, No stiffness  RESPIRATORY: No cough, No wheezing, No hemoptysis; No dyspnea  CARDIOVASCULAR: No chest pain, No palpitations  GASTROINTESTINAL: No abd pain, No nausea, No vomiting, No hematemesis, No diarrhea or constipation. No melena, No hematochezia.  GENITOURINARY: No dysuria, No  hematuria  NEUROLOGICAL: No diplopia, No paresthesia, No motor dysfunction  MUSCULOSKELETAL: No arthralgia, No myalgia  SKIN: No rashes, or lesions   PSYCH: no anxiety, no suicidal ideation    All other review of systems is negative unless indicated above    Vital Signs Last 24 Hrs  T(C): 36.2 (27 Feb 2023 07:36), Max: 36.5 (26 Feb 2023 22:05)  T(F): 97.2 (27 Feb 2023 07:36), Max: 97.7 (26 Feb 2023 22:05)  HR: 68 (27 Feb 2023 07:36) (68 - 98)  BP: 116/76 (27 Feb 2023 07:36) (116/76 - 156/77)  BP(mean): 106 (26 Feb 2023 13:44) (106 - 106)  RR: 17 (27 Feb 2023 07:36) (16 - 18)  SpO2: 92% (27 Feb 2023 07:36) (92% - 98%)    Parameters below as of 27 Feb 2023 07:36  Patient On (Oxygen Delivery Method): room air                PHYSICAL EXAM:    GENERAL: NAD  HEENT:  NC/AT, EOMI, PERRLA, No scleral icterus, Moist mucous membranes  NECK: Supple, No JVD  CNS:  Alert & Oriented X3, Motor Strength 5/5 B/L upper and lower extremities; DTRs 2+ intact   LUNG: Normal Breath sounds, Clear to auscultation bilaterally, mild  bib rales, No rhonchi, No wheezing  HEART: irregular ; No murmurs, No rubs  ABDOMEN: +BS, ST/ND/NT  GENITOURINARY: Voiding, Bladder not distended  EXTREMITIES:  2+ Peripheral Pulses, No clubbing, No cyanosis, No tibial edema  MUSCULOSKELTAL: Joints normal ROM, No TTP, No effusion  SKIN: no rashes  RECTAL: deferred, not indicated  BREAST: deferred               Labs:    02-27    138  |  107  |  66<H>  ----------------------------<  115<H>  3.8   |  25  |  1.69<H>    Ca    9.1      27 Feb 2023 06:18           MEDICATIONS  (STANDING):  aspirin enteric coated 81 milliGRAM(s) Oral daily  furosemide    Tablet 40 milliGRAM(s) Oral daily  heparin   Injectable 5000 Unit(s) SubCutaneous every 12 hours  latanoprost 0.005% Ophthalmic Solution 1 Drop(s) Both EYES at bedtime  melatonin 5 milliGRAM(s) Oral at bedtime  metoprolol tartrate 25 milliGRAM(s) Oral every 8 hours  multivitamin 1 Tablet(s) Oral daily  tamsulosin 0.8 milliGRAM(s) Oral at bedtime  timolol 0.5% Solution 1 Drop(s) Both EYES two times a day        Assessment:  	    1. New onset atrial fibrillation  rate controlled  c/w BB, ASA, Lovenox 80mg Bid  for DCCV after LHC    2. Acute systolic Left  CHF  c/w diuretic orally; Cr increased, will monitor   F/U TTE: EF mod decreased   for cardiac cath per cardiology     3. Elevated Troponin:  not ACS  for LHC  c/w BB/ASA/Statins     4. ARF:  s/p IV diuretic  will change Lasix to po and monitor Cr levels

## 2023-02-27 NOTE — PROGRESS NOTE ADULT - PROBLEM SELECTOR PLAN 1
Rate suboptimally controlled (tachycardic with exertion; occasional pauses) and in the setting of moderate LV systolic dysfunction; continue metoprolol; favor GIRMA/DCCV this week after LHC,  in effort to restore SR  lovenox on hold for LHC, cont. asa and BB

## 2023-02-28 LAB
ADD ON TEST-SPECIMEN IN LAB: SIGNIFICANT CHANGE UP
ALBUMIN SERPL ELPH-MCNC: 3.4 G/DL — SIGNIFICANT CHANGE UP (ref 3.3–5)
ALP SERPL-CCNC: 85 U/L — SIGNIFICANT CHANGE UP (ref 40–120)
ALT FLD-CCNC: 42 U/L — SIGNIFICANT CHANGE UP (ref 12–78)
ANION GAP SERPL CALC-SCNC: 8 MMOL/L — SIGNIFICANT CHANGE UP (ref 5–17)
APPEARANCE UR: CLEAR — SIGNIFICANT CHANGE UP
AST SERPL-CCNC: 30 U/L — SIGNIFICANT CHANGE UP (ref 15–37)
BACTERIA # UR AUTO: ABNORMAL
BILIRUB SERPL-MCNC: 1.2 MG/DL — SIGNIFICANT CHANGE UP (ref 0.2–1.2)
BILIRUB UR-MCNC: NEGATIVE — SIGNIFICANT CHANGE UP
BUN SERPL-MCNC: 68 MG/DL — HIGH (ref 7–23)
CALCIUM SERPL-MCNC: 9.2 MG/DL — SIGNIFICANT CHANGE UP (ref 8.5–10.1)
CHLORIDE SERPL-SCNC: 106 MMOL/L — SIGNIFICANT CHANGE UP (ref 96–108)
CO2 SERPL-SCNC: 26 MMOL/L — SIGNIFICANT CHANGE UP (ref 22–31)
COLOR SPEC: YELLOW — SIGNIFICANT CHANGE UP
CREAT SERPL-MCNC: 1.76 MG/DL — HIGH (ref 0.5–1.3)
DIFF PNL FLD: ABNORMAL
EGFR: 37 ML/MIN/1.73M2 — LOW
EPI CELLS # UR: SIGNIFICANT CHANGE UP
GLUCOSE SERPL-MCNC: 154 MG/DL — HIGH (ref 70–99)
GLUCOSE UR QL: NEGATIVE — SIGNIFICANT CHANGE UP
KETONES UR-MCNC: NEGATIVE — SIGNIFICANT CHANGE UP
LEUKOCYTE ESTERASE UR-ACNC: ABNORMAL
NITRITE UR-MCNC: NEGATIVE — SIGNIFICANT CHANGE UP
PH UR: 5 — SIGNIFICANT CHANGE UP (ref 5–8)
POTASSIUM SERPL-MCNC: 3.6 MMOL/L — SIGNIFICANT CHANGE UP (ref 3.5–5.3)
POTASSIUM SERPL-SCNC: 3.6 MMOL/L — SIGNIFICANT CHANGE UP (ref 3.5–5.3)
PROT SERPL-MCNC: 6.5 GM/DL — SIGNIFICANT CHANGE UP (ref 6–8.3)
PROT UR-MCNC: 30 MG/DL
RBC CASTS # UR COMP ASSIST: >50 /HPF (ref 0–4)
SODIUM SERPL-SCNC: 140 MMOL/L — SIGNIFICANT CHANGE UP (ref 135–145)
SP GR SPEC: 1.02 — SIGNIFICANT CHANGE UP (ref 1.01–1.02)
UROBILINOGEN FLD QL: NEGATIVE — SIGNIFICANT CHANGE UP
WBC UR QL: ABNORMAL /HPF (ref 0–5)

## 2023-02-28 PROCEDURE — 99233 SBSQ HOSP IP/OBS HIGH 50: CPT

## 2023-02-28 RX ORDER — METOPROLOL TARTRATE 50 MG
37.5 TABLET ORAL EVERY 8 HOURS
Refills: 0 | Status: DISCONTINUED | OUTPATIENT
Start: 2023-02-28 | End: 2023-03-02

## 2023-02-28 RX ADMIN — Medication 25 MILLIGRAM(S): at 05:19

## 2023-02-28 RX ADMIN — Medication 1 DROP(S): at 05:19

## 2023-02-28 RX ADMIN — Medication 81 MILLIGRAM(S): at 10:29

## 2023-02-28 RX ADMIN — Medication 40 MILLIGRAM(S): at 10:30

## 2023-02-28 RX ADMIN — Medication 1 TABLET(S): at 10:31

## 2023-02-28 RX ADMIN — Medication 37.5 MILLIGRAM(S): at 21:18

## 2023-02-28 NOTE — PROGRESS NOTE ADULT - SUBJECTIVE AND OBJECTIVE BOX
History of Present Illness:   Pt is a pleasant 89 yo male w/ PMHx of CHF, HTN on lisinopril/HTZ,  BPH  who was sent by his doctor, Dr. Umanzor  to  [place] ED  with complaint of  increasing dyspnea on exertion, dizziness and new onset afib.   Pt reported dyspnea over the last 2 weeks.   He now also has dyspnea  at rest and has noticed increased leg swelling, as noted by his daughters here at bedside in the ED.   Pt has not been on lasix.     2.25: no dyspnea at rest, no cp  2.26: +LE swelling, no dyspnea at rest  Tele: Afib   2.27: LE edema persists, Tele: Afib   no cp, no dyspnea at rest but present with minimal exertion   2.28: found to be Covid +, patient fell last jose w/o injuries, +dyspnea on exertion, no cp            REVIEW OF SYSTEMS:    CONSTITUTIONAL: No weakness, No fevers or chills  ENT: No ear ache, No sorethroat  NECK: No pain, No stiffness  RESPIRATORY: No cough, No wheezing, No hemoptysis; No dyspnea  CARDIOVASCULAR: No chest pain, No palpitations  GASTROINTESTINAL: No abd pain, No nausea, No vomiting, No hematemesis, No diarrhea or constipation. No melena, No hematochezia.  GENITOURINARY: No dysuria, No  hematuria  NEUROLOGICAL: No diplopia, No paresthesia, No motor dysfunction  MUSCULOSKELETAL: No arthralgia, No myalgia  SKIN: No rashes, or lesions   PSYCH: no anxiety, no suicidal ideation    All other review of systems is negative unless indicated above    Vital Signs Last 24 Hrs  T(C): 36.9 (28 Feb 2023 14:00), Max: 36.9 (28 Feb 2023 14:00)  T(F): 98.4 (28 Feb 2023 14:00), Max: 98.4 (28 Feb 2023 14:00)  HR: 98 (28 Feb 2023 14:00) (73 - 98)  BP: 107/75 (28 Feb 2023 14:00) (107/75 - 141/86)  BP(mean): 83 (27 Feb 2023 19:31) (83 - 83)  RR: 20 (28 Feb 2023 14:00) (20 - 23)  SpO2: 100% (28 Feb 2023 14:00) (98% - 100%)    Parameters below as of 28 Feb 2023 14:00    O2 Flow (L/min): 4        PHYSICAL EXAM:    GENERAL: NAD  HEENT:  NC/AT, EOMI, PERRLA, No scleral icterus, Moist mucous membranes  NECK: Supple, No JVD  CNS:  Alert & Oriented X3, Motor Strength 5/5 B/L upper and lower extremities; DTRs 2+ intact   LUNG: Normal Breath sounds, Clear to auscultation bilaterally, mild  bib rales, No rhonchi, No wheezing  HEART: irregular ; No murmurs, No rubs  ABDOMEN: +BS, ST/ND/NT  GENITOURINARY: Voiding, Bladder not distended  EXTREMITIES:  2+ Peripheral Pulses, No clubbing, No cyanosis, No tibial edema  MUSCULOSKELTAL: Joints normal ROM, No TTP, No effusion  SKIN: no rashes  RECTAL: deferred, not indicated  BREAST: deferred                 Labs:    02-28    140  |  106  |  68<H>  ----------------------------<  154<H>  3.6   |  26  |  1.76<H>    Ca    9.2      28 Feb 2023 07:32  Mg     2.5     02-28    TPro  6.5  /  Alb  3.4  /  TBili  1.2  /  DBili  x   /  AST  30  /  ALT  42  /  AlkPhos  85  02-28         MEDICATIONS  (STANDING):  aspirin enteric coated 81 milliGRAM(s) Oral daily  furosemide    Tablet 40 milliGRAM(s) Oral daily  heparin   Injectable 5000 Unit(s) SubCutaneous every 12 hours  latanoprost 0.005% Ophthalmic Solution 1 Drop(s) Both EYES at bedtime  melatonin 5 milliGRAM(s) Oral at bedtime  metoprolol tartrate 25 milliGRAM(s) Oral every 8 hours  multivitamin 1 Tablet(s) Oral daily  tamsulosin 0.8 milliGRAM(s) Oral at bedtime  timolol 0.5% Solution 1 Drop(s) Both EYES two times a day        Assessment:  	    1. New onset atrial fibrillation  rate controlled  c/w BB, ASA, Lovenox 80mg Bid  for DCCV after C    2. Acute systolic Left  CHF  TTE: EF 35%  for cardiac cath per cardiology   -s/p IV Lasix, now Cr increased; Lasix changed to po a few days ago    3. Elevated Troponin:  not ACS  for LHC  c/w BB/ASA/Statins     4. ARF:  s/p IV diuretic  will change Lasix to po and monitor Cr levels   Bladder scan for post void residual     5. Covid 19:   not hypoxic   supportive care  History of Present Illness:   Pt is a pleasant 88 yo male w/ PMHx of CHF, HTN on lisinopril/HTZ,  BPH  who was sent by his doctor, Dr. Umanzor  to  [facility] ED  with complaint of  increasing dyspnea on exertion, dizziness and new onset afib.   Pt reported dyspnea over the last 2 weeks.   He now also has dyspnea  at rest and has noticed increased leg swelling, as noted by his daughters here at bedside in the ED.   Pt has not been on lasix.     2.25: no dyspnea at rest, no cp  2.26: +LE swelling, no dyspnea at rest  Tele: Afib   2.27: LE edema persists, Tele: Afib   no cp, no dyspnea at rest but present with minimal exertion   2.28: found to be Covid +, patient fell last jose w/o injuries, +dyspnea on exertion, no cp            REVIEW OF SYSTEMS:    CONSTITUTIONAL: No weakness, No fevers or chills  ENT: No ear ache, No sorethroat  NECK: No pain, No stiffness  RESPIRATORY: No cough, No wheezing, No hemoptysis; No dyspnea  CARDIOVASCULAR: No chest pain, No palpitations  GASTROINTESTINAL: No abd pain, No nausea, No vomiting, No hematemesis, No diarrhea or constipation. No melena, No hematochezia.  GENITOURINARY: No dysuria, No  hematuria  NEUROLOGICAL: No diplopia, No paresthesia, No motor dysfunction  MUSCULOSKELETAL: No arthralgia, No myalgia  SKIN: No rashes, or lesions   PSYCH: no anxiety, no suicidal ideation    All other review of systems is negative unless indicated above    Vital Signs Last 24 Hrs  T(C): 36.9 (28 Feb 2023 14:00), Max: 36.9 (28 Feb 2023 14:00)  T(F): 98.4 (28 Feb 2023 14:00), Max: 98.4 (28 Feb 2023 14:00)  HR: 98 (28 Feb 2023 14:00) (73 - 98)  BP: 107/75 (28 Feb 2023 14:00) (107/75 - 141/86)  BP(mean): 83 (27 Feb 2023 19:31) (83 - 83)  RR: 20 (28 Feb 2023 14:00) (20 - 23)  SpO2: 100% (28 Feb 2023 14:00) (98% - 100%)    Parameters below as of 28 Feb 2023 14:00    O2 Flow (L/min): 4        PHYSICAL EXAM:    GENERAL: NAD  HEENT:  NC/AT, EOMI, PERRLA, No scleral icterus, Moist mucous membranes  NECK: Supple, No JVD  CNS:  Alert & Oriented X3, Motor Strength 5/5 B/L upper and lower extremities; DTRs 2+ intact   LUNG: Normal Breath sounds, Clear to auscultation bilaterally, mild  bib rales, No rhonchi, No wheezing  HEART: irregular ; No murmurs, No rubs  ABDOMEN: +BS, ST/ND/NT  GENITOURINARY: Voiding, Bladder not distended  EXTREMITIES:  2+ Peripheral Pulses, No clubbing, No cyanosis, No tibial edema  MUSCULOSKELTAL: Joints normal ROM, No TTP, No effusion  SKIN: no rashes  RECTAL: deferred, not indicated  BREAST: deferred                 Labs:    02-28    140  |  106  |  68<H>  ----------------------------<  154<H>  3.6   |  26  |  1.76<H>    Ca    9.2      28 Feb 2023 07:32  Mg     2.5     02-28    TPro  6.5  /  Alb  3.4  /  TBili  1.2  /  DBili  x   /  AST  30  /  ALT  42  /  AlkPhos  85  02-28         MEDICATIONS  (STANDING):  aspirin enteric coated 81 milliGRAM(s) Oral daily  furosemide    Tablet 40 milliGRAM(s) Oral daily  heparin   Injectable 5000 Unit(s) SubCutaneous every 12 hours  latanoprost 0.005% Ophthalmic Solution 1 Drop(s) Both EYES at bedtime  melatonin 5 milliGRAM(s) Oral at bedtime  metoprolol tartrate 25 milliGRAM(s) Oral every 8 hours  multivitamin 1 Tablet(s) Oral daily  tamsulosin 0.8 milliGRAM(s) Oral at bedtime  timolol 0.5% Solution 1 Drop(s) Both EYES two times a day        Assessment:  	    1. New onset atrial fibrillation  rate controlled  c/w BB, ASA, Lovenox 80mg Bid  for DCCV after C    2. Acute systolic Left  CHF  TTE: EF 35%  for cardiac cath per cardiology   -s/p IV Lasix, now Cr increased; Lasix changed to po a few days ago    3. Elevated Troponin:  not ACS  for LHC  c/w BB/ASA/Statins     4. ARF:  s/p IV diuretic  will change Lasix to po and monitor Cr levels   Bladder scan for post void residual   also r/o GABBIE, ( had CTA on admission )    5. Covid 19:   not hypoxic   supportive care

## 2023-02-28 NOTE — PROGRESS NOTE ADULT - SUBJECTIVE AND OBJECTIVE BOX
CHIEF COMPLAINT: Patient is a 89y old  Male who presents with a chief complaint of SOB    HPI: 89 year old man with a history of hypertension, BPH, and arthritis who presented to the ED after seeing his PCP (Dr Umanzor) earlier today because of shortness of breath.  He hasn't felt well for a few weeks -- predominant complaint was dyspnea that worsened with activity and alleviated with rest; associated with the onset of bilateral leg edema.  He reports no past history of heart disease. In the ED he was diagnosed with new-onset atrial fibrillation and CHF.  He is presently comfortable (at rest); denies orthopnea/weight gain/angina.    3/27/23: no complaints, Tele: Afib , npo p midnight for LHC tomorrow    2/28/23: Patient awake without complaints of cp or sob.  LHC postponed due to elevated creatinine.  Tele: afib 90's-120, frequent PVC's, couplets, triplets    MEDICATIONS  (STANDING):  aspirin enteric coated 81 milliGRAM(s) Oral daily  furosemide    Tablet 40 milliGRAM(s) Oral daily  heparin   Injectable 5000 Unit(s) SubCutaneous every 12 hours  latanoprost 0.005% Ophthalmic Solution 1 Drop(s) Both EYES at bedtime  melatonin 5 milliGRAM(s) Oral at bedtime  metoprolol tartrate 25 milliGRAM(s) Oral every 8 hours  multivitamin 1 Tablet(s) Oral daily  tamsulosin 0.8 milliGRAM(s) Oral at bedtime  timolol 0.5% Solution 1 Drop(s) Both EYES two times a day    MEDICATIONS  (PRN):  diclofenac 75 milliGRAM(s) Oral two times a day PRN Moderate Pain (4 - 6)    Vital Signs Last 24 Hrs  T(C): 36.9 (28 Feb 2023 14:00), Max: 36.9 (28 Feb 2023 14:00)  T(F): 98.4 (28 Feb 2023 14:00), Max: 98.4 (28 Feb 2023 14:00)  HR: 98 (28 Feb 2023 14:00) (73 - 98)  BP: 107/75 (28 Feb 2023 14:00) (107/75 - 141/86)  BP(mean): 83 (27 Feb 2023 19:31) (83 - 83)  RR: 20 (28 Feb 2023 14:00) (20 - 23)  SpO2: 100% (28 Feb 2023 14:00) (98% - 100%)    Parameters below as of 28 Feb 2023 14:00    O2 Flow (L/min): 4      PHYSICAL EXAM:  Constitutional: NAD, awake and alert, appears stated age  HEENT:   No oral cyanosis.  Pulmonary: Non-labored, breath sounds are clear bilaterally, No wheezing, rales or rhonchi  Cardiovascular: Irregular, normal S1/S2  Gastrointestinal: Bowel Sounds present, soft, nontender.   Lymph: Pitting b/l leg edema.   Neurological: Alert, no focal deficits  Skin: No rashes.  Psych:  Mood & affect appropriate    LABS:                     11.2   4.78  )-----------( 125      ( 24 Feb 2023 15:20 )             35.6     02-28    140  |  106  |  68<H>  ----------------------------<  154<H>  3.6   |  26  |  1.76<H>    Ca    9.2      28 Feb 2023 07:32  Mg     2.5     02-28    TPro  6.5  /  Alb  3.4  /  TBili  1.2  /  DBili  x   /  AST  30  /  ALT  42  /  AlkPhos  85  02-28      139    |  109    |  55     ----------------------------<  110    4.2     |  25     |  1.32     Ca    9.3        24 Feb 2023 15:20  Mg     2.5       24 Feb 2023 15:20    TPro  7.0    /  Alb  3.6    /  TBili  1.0    /  DBili  x      /  AST  18     /  ALT  24     /  AlkPhos  82     24 Feb 2023 15:20    PT/INR - ( 24 Feb 2023 15:20 )   PT: 14.2 sec;   INR: 1.22 ratio    PTT - ( 24 Feb 2023 15:20 )  PTT:31.3 sec    Pro Bnp [id number]    ECG: AF, RVR, RBBB      < from: TTE Echo Complete w/o Contrast w/ Doppler (02.25.23 @ 08:52) >   Impression     Summary     The mitral valve leaflets appear thickened.   Moderate (2+) mitral regurgitation ispresent.   Mild aortic sclerosis is present with normal valvular opening.   Trace aortic regurgitation is present.   Moderate to severe (3+) tricuspid valve regurgitation is present.   Severe pulmonary hypertension.   The left atrium is mildly dilated.   Left ventricle systolic function appears impaired; segmental wall motion   abnormalities noted. Estimated Ejection Fraction is 35- 40%.   Mild concentric left ventricular hypertrophy is present.   The IVC is dilated with decreased respiratory variation.     Signature     ----------------------------------------------------------------   Electronically signed by Thad Camejo MD(Interpreting    < end of copied text >

## 2023-02-28 NOTE — PROGRESS NOTE ADULT - PROBLEM SELECTOR PLAN 2
Moderate LV dysfunction 35-40% with segmental wall motion abnormalities on echo raising suspicion for underlying CAD; elevated [id number]  LHC cancelled today due to elevated creatinine.  Recommend gentle hydration and closely monitor renal function.  LHC when creatinine stable  GDMT limited by CKD - cont. BB  cont. diuresis with lasix 40 mg PO daily, daily weight, strict I&Os, fluid restriction 2L/day Moderate LV dysfunction 35-40% with segmental wall motion abnormalities on echo raising suspicion for underlying CAD; elevated [id number]  LHC cancelled today due to elevated creatinine.  Recommend gentle hydration and closely monitor renal function.  LHC when creatinine stable.  Consider RHC with LHC given elevated PASP  Unable to use ACEI/ARB/ARNI due to CKD - cont. BB  cont. diuresis with lasix 40 mg PO daily, daily weight, strict I&Os, fluid restriction 2L/day

## 2023-02-28 NOTE — PROGRESS NOTE ADULT - PROBLEM SELECTOR PLAN 1
Rate suboptimally controlled (tachycardic with exertion; occasional pauses) and in the setting of moderate LV systolic dysfunction; continue metoprolol; HR 90's-120.  Increase metoprolol from 25mg Q 8 hours to 37.5mg Q 8 hours with parameters.  LHC postponed due to elevated creatinine.  Recommend gentle hydration.  Closely monitor renal function.  Favor GIRMA/DCCV after LHC,  in effort to restore SR  lovenox on hold for LHC, cont. asa and BB    Maintain K>4, Mg>2 Rate suboptimally controlled (tachycardic with exertion; occasional pauses) and in the setting of moderate LV systolic dysfunction; continue metoprolol; HR 90's-120.  Increase metoprolol from 25mg Q 8 hours to 37.5mg Q 8 hours with parameters.  LHC postponed due to elevated creatinine.  Recommend gentle hydration.  Closely monitor renal function.  Favor GIRMA/DCCV after cath,  in effort to restore SR  lovenox on hold for cardiac cath, cont. asa and BB    Maintain K>4, Mg>2    Consider RHC with LHC given elevated PASP

## 2023-03-01 LAB
ALBUMIN SERPL ELPH-MCNC: 3.5 G/DL — SIGNIFICANT CHANGE UP (ref 3.3–5)
ALP SERPL-CCNC: 90 U/L — SIGNIFICANT CHANGE UP (ref 40–120)
ALT FLD-CCNC: 52 U/L — SIGNIFICANT CHANGE UP (ref 12–78)
ANION GAP SERPL CALC-SCNC: 7 MMOL/L — SIGNIFICANT CHANGE UP (ref 5–17)
AST SERPL-CCNC: 39 U/L — HIGH (ref 15–37)
BILIRUB DIRECT SERPL-MCNC: 0.5 MG/DL — HIGH (ref 0–0.3)
BILIRUB INDIRECT FLD-MCNC: 0.8 MG/DL — SIGNIFICANT CHANGE UP (ref 0.2–1)
BILIRUB SERPL-MCNC: 1.3 MG/DL — HIGH (ref 0.2–1.2)
BUN SERPL-MCNC: 69 MG/DL — HIGH (ref 7–23)
CALCIUM SERPL-MCNC: 9.2 MG/DL — SIGNIFICANT CHANGE UP (ref 8.5–10.1)
CHLORIDE SERPL-SCNC: 108 MMOL/L — SIGNIFICANT CHANGE UP (ref 96–108)
CO2 SERPL-SCNC: 28 MMOL/L — SIGNIFICANT CHANGE UP (ref 22–31)
CREAT SERPL-MCNC: 1.35 MG/DL — HIGH (ref 0.5–1.3)
CREAT SERPL-MCNC: 1.48 MG/DL — HIGH (ref 0.5–1.3)
CULTURE RESULTS: SIGNIFICANT CHANGE UP
EGFR: 45 ML/MIN/1.73M2 — LOW
EGFR: 50 ML/MIN/1.73M2 — LOW
GLUCOSE SERPL-MCNC: 125 MG/DL — HIGH (ref 70–99)
POTASSIUM SERPL-MCNC: 4.2 MMOL/L — SIGNIFICANT CHANGE UP (ref 3.5–5.3)
POTASSIUM SERPL-SCNC: 4.2 MMOL/L — SIGNIFICANT CHANGE UP (ref 3.5–5.3)
PROT SERPL-MCNC: 6.8 GM/DL — SIGNIFICANT CHANGE UP (ref 6–8.3)
SODIUM SERPL-SCNC: 143 MMOL/L — SIGNIFICANT CHANGE UP (ref 135–145)
SPECIMEN SOURCE: SIGNIFICANT CHANGE UP

## 2023-03-01 PROCEDURE — 99233 SBSQ HOSP IP/OBS HIGH 50: CPT

## 2023-03-01 PROCEDURE — 71045 X-RAY EXAM CHEST 1 VIEW: CPT | Mod: 26

## 2023-03-01 RX ORDER — REMDESIVIR 5 MG/ML
INJECTION INTRAVENOUS
Refills: 0 | Status: COMPLETED | OUTPATIENT
Start: 2023-03-01 | End: 2023-03-05

## 2023-03-01 RX ORDER — REMDESIVIR 5 MG/ML
200 INJECTION INTRAVENOUS EVERY 24 HOURS
Refills: 0 | Status: COMPLETED | OUTPATIENT
Start: 2023-03-01 | End: 2023-03-01

## 2023-03-01 RX ORDER — REMDESIVIR 5 MG/ML
100 INJECTION INTRAVENOUS EVERY 24 HOURS
Refills: 0 | Status: COMPLETED | OUTPATIENT
Start: 2023-03-02 | End: 2023-03-05

## 2023-03-01 RX ORDER — DEXAMETHASONE 0.5 MG/5ML
6 ELIXIR ORAL DAILY
Refills: 0 | Status: DISCONTINUED | OUTPATIENT
Start: 2023-03-01 | End: 2023-03-07

## 2023-03-01 RX ADMIN — HEPARIN SODIUM 5000 UNIT(S): 5000 INJECTION INTRAVENOUS; SUBCUTANEOUS at 22:15

## 2023-03-01 RX ADMIN — Medication 81 MILLIGRAM(S): at 10:57

## 2023-03-01 RX ADMIN — Medication 1 TABLET(S): at 10:57

## 2023-03-01 RX ADMIN — HEPARIN SODIUM 5000 UNIT(S): 5000 INJECTION INTRAVENOUS; SUBCUTANEOUS at 10:57

## 2023-03-01 RX ADMIN — REMDESIVIR 200 MILLIGRAM(S): 5 INJECTION INTRAVENOUS at 18:38

## 2023-03-01 RX ADMIN — Medication 37.5 MILLIGRAM(S): at 13:32

## 2023-03-01 RX ADMIN — Medication 1 DROP(S): at 18:38

## 2023-03-01 RX ADMIN — Medication 1 DROP(S): at 05:40

## 2023-03-01 RX ADMIN — Medication 5 MILLIGRAM(S): at 22:16

## 2023-03-01 RX ADMIN — Medication 37.5 MILLIGRAM(S): at 05:39

## 2023-03-01 RX ADMIN — LATANOPROST 1 DROP(S): 0.05 SOLUTION/ DROPS OPHTHALMIC; TOPICAL at 22:20

## 2023-03-01 RX ADMIN — Medication 6 MILLIGRAM(S): at 14:50

## 2023-03-01 RX ADMIN — TAMSULOSIN HYDROCHLORIDE 0.8 MILLIGRAM(S): 0.4 CAPSULE ORAL at 22:16

## 2023-03-01 RX ADMIN — Medication 37.5 MILLIGRAM(S): at 22:15

## 2023-03-01 RX ADMIN — Medication 40 MILLIGRAM(S): at 10:57

## 2023-03-01 NOTE — PROGRESS NOTE ADULT - PROBLEM SELECTOR PLAN 1
Rate suboptimally controlled (tachycardic with exertion; occasional pauses) and in the setting of moderate LV systolic dysfunction; continue BB -  metoprolol increased  to 37.5mg Q 8 hours with parameters. Creatinine improved after IV hydration, will have LHC later on 3/1, Recommend GIRMA/DCCV after cath,  in an effort to restore SR  lovenox on hold for cardiac cath, cont. asa and BB    Maintain K>4, Mg>2    Consider RHC with LHC given elevated PASP

## 2023-03-01 NOTE — CONSULT NOTE ADULT - SUBJECTIVE AND OBJECTIVE BOX
Patient is a 89y old  Male who presents with a chief complaint of New onset atrial fibrillation  New onset CHF  Troponin elevation     HPI:  88 y/o male with h/o CHF, HTN on lisinopril/HTZ, BPH was admitted on  for increasing dyspnea on exertion, dizziness and new onset A.fib. Pt reported dyspnea over the last 2 weeks PTA. He has dyspnea at rest and has noticed increased leg swelling, as noted by his daughters here at bedside in the ED. U    Pt denies cp, cough, no abd pain, no n/v/d, no night time orthopnea or PND,  no palpitations, no f/c, no resp or urinary complaints.       (2023 18:04)      PMH: as above  PSH: as above  Meds: per reconciliation sheet, noted below  MEDICATIONS  (STANDING):  aspirin enteric coated 81 milliGRAM(s) Oral daily  furosemide    Tablet 40 milliGRAM(s) Oral daily  heparin   Injectable 5000 Unit(s) SubCutaneous every 12 hours  latanoprost 0.005% Ophthalmic Solution 1 Drop(s) Both EYES at bedtime  melatonin 5 milliGRAM(s) Oral at bedtime  metoprolol tartrate 37.5 milliGRAM(s) Oral every 8 hours  multivitamin 1 Tablet(s) Oral daily  tamsulosin 0.8 milliGRAM(s) Oral at bedtime  timolol 0.5% Solution 1 Drop(s) Both EYES two times a day    MEDICATIONS  (PRN):  diclofenac 75 milliGRAM(s) Oral two times a day PRN Moderate Pain (4 - 6)    Allergies    No Known Allergies    Intolerances      Social: no smoking, no alcohol, no illegal drugs; no recent travel, no exposure to TB  FAMILY HISTORY:  FH: type 2 diabetes mellitus (Father)    Family hx of hypertension (Mother)    Family history of hypertension (Father)      no history of premature cardiovascular disease in first degree relatives    ROS: the patient denies fever, no chills, no HA, no seizures, no dizziness, no sore throat, no nasal congestion, no blurry vision, no CP, no palpitations, no SOB, no cough, no abdominal pain, no diarrhea, no N/V, no dysuria, no leg pain, no claudication, no rash, no joint aches, no rectal pain or bleeding, no night sweats  All other systems reviewed and are negative    Vital Signs Last 24 Hrs  T(C): 36.4 (01 Mar 2023 08:05), Max: 36.9 (2023 14:00)  T(F): 97.6 (01 Mar 2023 08:05), Max: 98.4 (2023 14:00)  HR: 99 (01 Mar 2023 08:05) (95 - 103)  BP: 142/84 (01 Mar 2023 08:05) (107/75 - 142/84)  BP(mean): --  RR: 18 (01 Mar 2023 08:05) (18 - 20)  SpO2: 100% (01 Mar 2023 08:05) (100% - 100%)    Parameters below as of 01 Mar 2023 08:05  Patient On (Oxygen Delivery Method): nasal cannula      Daily     Daily Weight in k (01 Mar 2023 05:31)    PE:    Constitutional:  No acute distress  HEENT: NC/AT, EOMI, PERRLA, conjunctivae clear; ears and nose atraumatic; pharynx benign  Neck: supple; thyroid not palpable  Back: no tenderness  Respiratory: respiratory effort normal; clear to auscultation  Cardiovascular: S1S2 regular, no murmurs  Abdomen: soft, not tender, not distended, positive BS; no liver or spleen organomegaly  Genitourinary: no suprapubic tenderness  Lymphatic: no LN palpable  Musculoskeletal: no muscle tenderness, no joint swelling or tenderness  Extremities: no pedal edema  Neurological/ Psychiatric: AxOx3, judgement and insight normal; moving all extremities  Skin: no rashes; no palpable lesions    Labs: all available labs reviewed        143  |  108  |  69<H>  ----------------------------<  125<H>  4.2   |  28  |  1.48<H>    Ca    9.2      01 Mar 2023 06:27  Mg     2.5         TPro  6.5  /  Alb  3.4  /  TBili  1.2  /  DBili  x   /  AST  30  /  ALT  42  /  AlkPhos  85       LIVER FUNCTIONS - ( 2023 07:32 )  Alb: 3.4 g/dL / Pro: 6.5 gm/dL / ALK PHOS: 85 U/L / ALT: 42 U/L / AST: 30 U/L / GGT: x           Urinalysis Basic - ( 2023 18:40 )    Color: Yellow / Appearance: Clear / S.020 / pH: x  Gluc: x / Ketone: Negative  / Bili: Negative / Urobili: Negative   Blood: x / Protein: 30 mg/dL / Nitrite: Negative   Leuk Esterase: Small / RBC: >50 /HPF / WBC 11-25 /HPF   Sq Epi: x / Non Sq Epi: Few / Bacteria: Few          COVID-19 PCR: Detected (23 @ 15:20)      ( @ 15:53)  NotDete      Radiology: all available radiological tests reviewed    Advanced directives addressed: full resuscitation Patient is a 89y old  Male who presents with a chief complaint of New onset atrial fibrillation  New onset CHF  Troponin elevation     HPI:  88 y/o male with h/o CHF, HTN on lisinopril/HTZ, BPH was admitted on  for increasing dyspnea on exertion, dizziness and new onset A.fib. Pt reported dyspnea over the last 2 weeks PTA. He has dyspnea at rest and has noticed increased leg swelling, as noted by his daughters here at bedside in the ED. His initial COVID test was negative, but then on  he was noted COVID PCR detected. He is SOB on supplemental O2 therapy.     PMH: as above  PSH: as above  Meds: per reconciliation sheet, noted below  MEDICATIONS  (STANDING):  aspirin enteric coated 81 milliGRAM(s) Oral daily  furosemide    Tablet 40 milliGRAM(s) Oral daily  heparin   Injectable 5000 Unit(s) SubCutaneous every 12 hours  latanoprost 0.005% Ophthalmic Solution 1 Drop(s) Both EYES at bedtime  melatonin 5 milliGRAM(s) Oral at bedtime  metoprolol tartrate 37.5 milliGRAM(s) Oral every 8 hours  multivitamin 1 Tablet(s) Oral daily  tamsulosin 0.8 milliGRAM(s) Oral at bedtime  timolol 0.5% Solution 1 Drop(s) Both EYES two times a day    MEDICATIONS  (PRN):  diclofenac 75 milliGRAM(s) Oral two times a day PRN Moderate Pain (4 - 6)    Allergies    No Known Allergies    Intolerances      Social: no smoking, no alcohol, no illegal drugs; no recent travel, no exposure to TB  FAMILY HISTORY:  FH: type 2 diabetes mellitus (Father)    Family hx of hypertension (Mother)    Family history of hypertension (Father)      no history of premature cardiovascular disease in first degree relatives    ROS: the patient denies fever, no chills, no HA, no seizures, no dizziness, no sore throat, no nasal congestion, no blurry vision, no CP, no palpitations, has SOB, has cough, no abdominal pain, no diarrhea, no N/V, no dysuria, no leg pain, no claudication, no rash, no joint aches, no rectal pain or bleeding, no night sweats  All other systems reviewed and are negative    Vital Signs Last 24 Hrs  T(C): 36.4 (01 Mar 2023 08:05), Max: 36.9 (2023 14:00)  T(F): 97.6 (01 Mar 2023 08:05), Max: 98.4 (2023 14:00)  HR: 99 (01 Mar 2023 08:05) (95 - 103)  BP: 142/84 (01 Mar 2023 08:05) (107/75 - 142/84)  BP(mean): --  RR: 18 (01 Mar 2023 08:05) (18 - 20)  SpO2: 100% (01 Mar 2023 08:05) (100% - 100%)    Parameters below as of 01 Mar 2023 08:05  Patient On (Oxygen Delivery Method): nasal cannula      Daily     Daily Weight in k (01 Mar 2023 05:31)    PE:    Constitutional:  No acute distress  HEENT: NC/AT, EOMI, PERRLA, conjunctivae clear; ears and nose atraumatic; pharynx benign  Neck: supple; thyroid not palpable  Back: no tenderness  Respiratory: respiratory effort normal; crackles at bases  Cardiovascular: S1S2 regular, no murmurs  Abdomen: soft, not tender, not distended, positive BS; no liver or spleen organomegaly  Genitourinary: no suprapubic tenderness  Lymphatic: no LN palpable  Musculoskeletal: no muscle tenderness, no joint swelling or tenderness  Extremities: no pedal edema  Neurological/ Psychiatric: AxOx3, judgement and insight normal; moving all extremities  Skin: no rashes; no palpable lesions    Labs: all available labs reviewed        143  |  108  |  69<H>  ----------------------------<  125<H>  4.2   |  28  |  1.48<H>    Ca    9.2      01 Mar 2023 06:27  Mg     2.5         TPro  6.5  /  Alb  3.4  /  TBili  1.2  /  DBili  x   /  AST  30  /  ALT  42  /  AlkPhos  85       LIVER FUNCTIONS - ( 2023 07:32 )  Alb: 3.4 g/dL / Pro: 6.5 gm/dL / ALK PHOS: 85 U/L / ALT: 42 U/L / AST: 30 U/L / GGT: x           Urinalysis Basic - ( 2023 18:40 )    Color: Yellow / Appearance: Clear / S.020 / pH: x  Gluc: x / Ketone: Negative  / Bili: Negative / Urobili: Negative   Blood: x / Protein: 30 mg/dL / Nitrite: Negative   Leuk Esterase: Small / RBC: >50 /HPF / WBC 11-25 /HPF   Sq Epi: x / Non Sq Epi: Few / Bacteria: Few    COVID-19 PCR: Detected (23 @ 15:20)    ( @ 15:53)  NotDetec    Radiology: all available radiological tests reviewed    Advanced directives addressed: full resuscitation Patient is a 89y old  Male who presents with a chief complaint of New onset atrial fibrillation  New onset CHF  Troponin elevation     HPI:  88 y/o male with h/o CHF, HTN on lisinopril/HTZ, BPH was admitted on  for increasing dyspnea on exertion, dizziness and new onset A.fib. Pt reported dyspnea over the last 2 weeks PTA. He has dyspnea at rest and has noticed increased leg swelling, as noted by his daughters here at bedside in the ED. His initial COVID test was negative, but then on  he was noted COVID PCR detected. He is SOB on supplemental O2 therapy.     PMH: as above  PSH: as above  Meds: per reconciliation sheet, noted below  MEDICATIONS  (STANDING):  aspirin enteric coated 81 milliGRAM(s) Oral daily  furosemide    Tablet 40 milliGRAM(s) Oral daily  heparin   Injectable 5000 Unit(s) SubCutaneous every 12 hours  latanoprost 0.005% Ophthalmic Solution 1 Drop(s) Both EYES at bedtime  melatonin 5 milliGRAM(s) Oral at bedtime  metoprolol tartrate 37.5 milliGRAM(s) Oral every 8 hours  multivitamin 1 Tablet(s) Oral daily  tamsulosin 0.8 milliGRAM(s) Oral at bedtime  timolol 0.5% Solution 1 Drop(s) Both EYES two times a day    MEDICATIONS  (PRN):  diclofenac 75 milliGRAM(s) Oral two times a day PRN Moderate Pain (4 - 6)    Allergies    No Known Allergies    Intolerances      Social: no smoking, no alcohol, no illegal drugs; no recent travel, no exposure to TB  FAMILY HISTORY:  FH: type 2 diabetes mellitus (Father)  Family hx of hypertension (Mother)  Family history of hypertension (Father)  no history of premature cardiovascular disease in first degree relatives    ROS: the patient denies fever, no chills, no HA, no seizures, no dizziness, no sore throat, no nasal congestion, no blurry vision, no CP, no palpitations, has SOB, has cough, no abdominal pain, no diarrhea, no N/V, no dysuria, no leg pain, no claudication, no rash, no joint aches, no rectal pain or bleeding, no night sweats  All other systems reviewed and are negative    Vital Signs Last 24 Hrs  T(C): 36.4 (01 Mar 2023 08:05), Max: 36.9 (2023 14:00)  T(F): 97.6 (01 Mar 2023 08:05), Max: 98.4 (2023 14:00)  HR: 99 (01 Mar 2023 08:05) (95 - 103)  BP: 142/84 (01 Mar 2023 08:05) (107/75 - 142/84)  BP(mean): --  RR: 18 (01 Mar 2023 08:05) (18 - 20)  SpO2: 100% (01 Mar 2023 08:05) (100% - 100%)    Parameters below as of 01 Mar 2023 08:05  Patient On (Oxygen Delivery Method): nasal cannula      Daily     Daily Weight in k (01 Mar 2023 05:31)    PE:    Constitutional:  No acute distress  HEENT: NC/AT, EOMI, PERRLA, conjunctivae clear; ears and nose atraumatic; pharynx benign  Neck: supple; thyroid not palpable  Back: no tenderness  Respiratory: respiratory effort normal; crackles at bases  Cardiovascular: S1S2 regular, no murmurs  Abdomen: soft, not tender, not distended, positive BS; no liver or spleen organomegaly  Genitourinary: no suprapubic tenderness  Lymphatic: no LN palpable  Musculoskeletal: no muscle tenderness, no joint swelling or tenderness  Extremities: no pedal edema  Neurological/ Psychiatric: AxOx3, judgement and insight normal; moving all extremities  Skin: no rashes; no palpable lesions    Labs: all available labs reviewed        143  |  108  |  69<H>  ----------------------------<  125<H>  4.2   |  28  |  1.48<H>    Ca    9.2      01 Mar 2023 06:27  Mg     2.5         TPro  6.5  /  Alb  3.4  /  TBili  1.2  /  DBili  x   /  AST  30  /  ALT  42  /  AlkPhos  85       LIVER FUNCTIONS - ( 2023 07:32 )  Alb: 3.4 g/dL / Pro: 6.5 gm/dL / ALK PHOS: 85 U/L / ALT: 42 U/L / AST: 30 U/L / GGT: x           Urinalysis Basic - ( 2023 18:40 )    Color: Yellow / Appearance: Clear / S.020 / pH: x  Gluc: x / Ketone: Negative  / Bili: Negative / Urobili: Negative   Blood: x / Protein: 30 mg/dL / Nitrite: Negative   Leuk Esterase: Small / RBC: >50 /HPF / WBC 11-25 /HPF   Sq Epi: x / Non Sq Epi: Few / Bacteria: Few    COVID-19 PCR: Detected (23 @ 15:20)    ( @ 15:53)  [facility]    Radiology: all available radiological tests reviewed    < from: CT Angio Chest PE Protocol w/ IV Cont (23 @ 16:57) >  No evidence of central, lobar, or segmental pulmonary embolism.  Cardiomegaly with a small-moderate circumferential pericardial effusion.   Correlation with echocardiogram is suggested.  Moderate right and small left pleural effusions.  < end of copied text >    < from: Xray Chest 1 View-PORTABLE IMMEDIATE (Xray Chest 1 View-PORTABLE IMMEDIATE .) (23 @ 09:29) >  IMPRESSION: Increase in mild right base pleural pulmonary process.  < end of copied text >      Advanced directives addressed: full resuscitation

## 2023-03-01 NOTE — CONSULT NOTE ADULT - ASSESSMENT
89 y/o male with h/o CHF, HTN on lisinopril/HTZ, BPH was admitted on 2/24 for increasing dyspnea on exertion, dizziness and new onset A.fib. Pt reported dyspnea over the last 2 weeks PTA. He has dyspnea at rest and has noticed increased leg swelling, as noted by his daughters here at bedside in the ED. His initial COVID test was negative, but then on 2/27 he was noted COVID PCR detected. He is SOB on supplemental O2 therapy.  88 y/o male with h/o CHF, HTN on lisinopril/HTZ, BPH was admitted on 2/24 for increasing dyspnea on exertion, dizziness and new onset A.fib. Pt reported dyspnea over the last 2 weeks PTA. He has dyspnea at rest and has noticed increased leg swelling, as noted by his daughters here at bedside in the ED. His initial COVID test was negative, but then on 2/27 he was noted COVID PCR detected. He is SOB on supplemental O2 therapy.     1. Acute respiratory failure. COVID-19 viral syndrome. Multifocal pneumonia. CHF exacerbation.  -respiratory frail  -cultures noted  -start remdesivir protocol  -remdesivir risks and benefits reviewed with patient and he agreed with the use of the antiviral medication  -O2 therapy  -steroids  -AC  -droplet isolation  -respiratory care  -old chart reviewed to assess prior cultures  -monitor temps  -f/u CBC  -supportive care  2. Other issues:   -care per medicine   88 y/o male with h/o CHF, HTN on lisinopril/HTZ, BPH was admitted on 2/24 for increasing dyspnea on exertion, dizziness and new onset A.fib. Pt reported dyspnea over the last 2 weeks PTA. He has dyspnea at rest and has noticed increased leg swelling, as noted by his daughters here at bedside in the ED. His initial COVID test was negative, but then on 2/27 he was noted COVID PCR detected. He is SOB on supplemental O2 therapy.     1. Acute respiratory failure. COVID-19 viral syndrome. Multifocal pneumonia. CHF exacerbation. CRF stage 3.  -respiratory frail  -cultures noted  -start remdesivir protocol  -remdesivir risks and benefits reviewed with patient and he agreed with the use of the antiviral medication  -O2 therapy  -steroids  -AC  -droplet isolation  -respiratory care  -old chart reviewed to assess prior cultures  -monitor temps  -f/u CBC  -supportive care  2. Other issues:   -care per medicine

## 2023-03-01 NOTE — PROGRESS NOTE ADULT - SUBJECTIVE AND OBJECTIVE BOX
Chief Complaint: Shortness of breath with exertion, bilateral leg swelling    Interval Hx: Patient reports some improvement since admission. Less dyspnea with activity. No dyspnea at rest. Still with B/L leg edema. He is also generally weak. He also tested positive for COVID19 and is requiring supplemental oxygen. Seen by ID, started on remdesivir and dexamethasone. Awaiting LHC/RHC to further evaluation his reduced LVEF and pHTN.     ROS: Multi system review is comprehensively negative x 10 systems except as above    Vitals:  T(F): 97.6 (01 Mar 2023 08:05), Max: 97.6 (28 Feb 2023 21:10)  HR: 99 (01 Mar 2023 08:05) (95 - 103)  BP: 142/84 (01 Mar 2023 08:05) (120/90 - 142/84)  RR: 18 (01 Mar 2023 08:05) (18 - 19)  SpO2: 100% (01 Mar 2023 08:05) (100% - 100%) on O2 via NC    Exam:  Gen: No acute distress  HEENT: NCAT PERRL EOMI MMM clear oropharynx  Neck: Supple, no LAD, no thyromegaly  Chest: Normal resp effort at rest, diminished breath sounds at bases B/L  CVS: s1 s2 normal, irregular, tachycardic with HR 90  Abd: +BS, soft NT ND   Ext: +B/L LE edema, no tenderness, normal cap refill  Skin: Warm, dry  Mood: calm, pleasant  Neuro: Awake and alert, answers questions appropriately, follows commands, no gross deficits    Labs:    Na 143  K 4.2  Cl 108  CO2 28  BUN 69  Cr 1.48  Gluc 125  Ca 9.2  Mg 2.5    TPro  6.8  /  Alb  3.5  /  TBili  1.3  /  DBili  0.5  /  AST  39  /  ALT  52  /  AlkPhos  90      Troponin 90, 75   proBNP 11,157   Ddimer 1163    Urinalysis 2/28: Yellow, clear, ket neg, prot 30, N neg, LE small, RBC > 50, WBC 11-25, bact few    Micro:  COVID19 PCR 2/27: Positive   Resp pathogens PCR 2/24: Negative  COVID19 PCR 2/24: Negative    Imaging:  CXR 3/1: Gross heart enlargement again noted. There are slightly increasing right base pleural pulmonary process compared to February 24. Otherwise no change.    CTA chest W/ 2/24: There are no filling defects in the main pulmonary artery or its lobar and segmental branches. Cardiomegaly. There is a small-moderate circumferential pericardial effusion. The great vessels are normal in size. Moderate right and small left pleural effusions. There is mild groundglass opacity in the right upper lobe, likely secondary to pulmonary edema. Mild consolidation in the right lower lobe, likely atelectasis. The central airways are patent. No thoracic adenopathy. Fluid attenuating nodule in the left adrenal gland, possibly an adrenal adenoma. Perihepatic ascites. No aggressive osseous lesions.    CXR 2/24: Moderate enlargement of cardiac silhouette. Left lung is clear. Small right effusion/consolidation.    Cardiac Testing:  Tele 3/1: Afib, rates 80s-100s, PVCs, 11 sec of WCT likely NSVT    TTE 2/25:  The mitral valve leaflets appear thickened. Moderate (2+) mitral regurgitation present. Mild aortic sclerosis is present with normal valvular opening. Trace aortic regurgitation is present. Moderate to severe (3+) tricuspid valve regurgitation is present. Severe pulmonary hypertension. The left atrium is mildly dilated. Left ventricle systolic function appears impaired; segmental wall motion abnormalities noted. Estimated Ejection Fraction is 35- 40%. Mild concentric left ventricular hypertrophy is present. The IVC is dilated with decreased respiratory variation.    EKG 2/24: Rate 106. Atrial fibrillation with rapid ventricular response with premature ventricular or aberrantly conducted complexes. Right bundle branch block.    Meds:  MEDICATIONS  (STANDING):  aspirin enteric coated 81 milliGRAM(s) Oral daily  dexAMETHasone     Tablet 6 milliGRAM(s) Oral daily  furosemide    Tablet 40 milliGRAM(s) Oral daily  heparin   Injectable 5000 Unit(s) SubCutaneous every 12 hours  latanoprost 0.005% Ophthalmic Solution 1 Drop(s) Both EYES at bedtime  melatonin 5 milliGRAM(s) Oral at bedtime  metoprolol tartrate 37.5 milliGRAM(s) Oral every 8 hours  multivitamin 1 Tablet(s) Oral daily  remdesivir  IVPB   IV Intermittent   remdesivir  IVPB 200 milliGRAM(s) IV Intermittent every 24 hours  tamsulosin 0.8 milliGRAM(s) Oral at bedtime  timolol 0.5% Solution 1 Drop(s) Both EYES two times a day    MEDICATIONS  (PRN):  diclofenac 75 milliGRAM(s) Oral two times a day PRN Moderate Pain (4 - 6)   Chief Complaint: Shortness of breath with exertion, bilateral leg swelling    Interval Hx: Patient reports some improvement since admission. Less dyspnea with activity. No dyspnea at rest. Still with B/L leg edema. He is also generally weak. He also tested positive for COVID19 and is requiring supplemental oxygen. Seen by ID, started on remdesivir and dexamethasone. Awaiting LHC/RHC to further evaluation his reduced LVEF and pHTN. GIRMA/DCC thereafter.     ROS: Multi system review is comprehensively negative x 10 systems except as above    Vitals:  T(F): 97.6 (01 Mar 2023 08:05), Max: 97.6 (28 Feb 2023 21:10)  HR: 99 (01 Mar 2023 08:05) (95 - 103)  BP: 142/84 (01 Mar 2023 08:05) (120/90 - 142/84)  RR: 18 (01 Mar 2023 08:05) (18 - 19)  SpO2: 100% (01 Mar 2023 08:05) (100% - 100%) on O2 via NC    Exam:  Gen: No acute distress  HEENT: NCAT PERRL EOMI MMM clear oropharynx  Neck: Supple, no LAD, no thyromegaly  Chest: Normal resp effort at rest, diminished breath sounds at bases B/L  CVS: s1 s2 normal, irregular, tachycardic with HR 90  Abd: +BS, soft NT ND   Ext: +B/L LE edema, no tenderness, normal cap refill  Skin: Warm, dry  Mood: calm, pleasant  Neuro: Awake and alert, answers questions appropriately, follows commands, no gross deficits    Labs:    Na 143  K 4.2  Cl 108  CO2 28  BUN 69  Cr 1.48  Gluc 125  Ca 9.2  Mg 2.5    TPro  6.8  /  Alb  3.5  /  TBili  1.3  /  DBili  0.5  /  AST  39  /  ALT  52  /  AlkPhos  90      Troponin 90, 75   proBNP 11,157   Ddimer 1163    Urinalysis 2/28: Yellow, clear, ket neg, prot 30, N neg, LE small, RBC > 50, WBC 11-25, bact few    Micro:  COVID19 PCR 2/27: Positive   Resp pathogens PCR 2/24: Negative  COVID19 PCR 2/24: Negative    Imaging:  CXR 3/1: Gross heart enlargement again noted. There are slightly increasing right base pleural pulmonary process compared to February 24. Otherwise no change.    CTA chest W/ 2/24: There are no filling defects in the main pulmonary artery or its lobar and segmental branches. Cardiomegaly. There is a small-moderate circumferential pericardial effusion. The great vessels are normal in size. Moderate right and small left pleural effusions. There is mild groundglass opacity in the right upper lobe, likely secondary to pulmonary edema. Mild consolidation in the right lower lobe, likely atelectasis. The central airways are patent. No thoracic adenopathy. Fluid attenuating nodule in the left adrenal gland, possibly an adrenal adenoma. Perihepatic ascites. No aggressive osseous lesions.    CXR 2/24: Moderate enlargement of cardiac silhouette. Left lung is clear. Small right effusion/consolidation.    Cardiac Testing:  Tele 3/1: Afib, rates 80s-100s, PVCs, 11 sec of WCT likely NSVT    TTE 2/25:  The mitral valve leaflets appear thickened. Moderate (2+) mitral regurgitation present. Mild aortic sclerosis is present with normal valvular opening. Trace aortic regurgitation is present. Moderate to severe (3+) tricuspid valve regurgitation is present. Severe pulmonary hypertension. The left atrium is mildly dilated. Left ventricle systolic function appears impaired; segmental wall motion abnormalities noted. Estimated Ejection Fraction is 35- 40%. Mild concentric left ventricular hypertrophy is present. The IVC is dilated with decreased respiratory variation.    EKG 2/24: Rate 106. Atrial fibrillation with rapid ventricular response with premature ventricular or aberrantly conducted complexes. Right bundle branch block.    Meds:  MEDICATIONS  (STANDING):  aspirin enteric coated 81 milliGRAM(s) Oral daily  dexAMETHasone     Tablet 6 milliGRAM(s) Oral daily  furosemide    Tablet 40 milliGRAM(s) Oral daily  heparin   Injectable 5000 Unit(s) SubCutaneous every 12 hours  latanoprost 0.005% Ophthalmic Solution 1 Drop(s) Both EYES at bedtime  melatonin 5 milliGRAM(s) Oral at bedtime  metoprolol tartrate 37.5 milliGRAM(s) Oral every 8 hours  multivitamin 1 Tablet(s) Oral daily  remdesivir  IVPB   IV Intermittent   remdesivir  IVPB 200 milliGRAM(s) IV Intermittent every 24 hours  tamsulosin 0.8 milliGRAM(s) Oral at bedtime  timolol 0.5% Solution 1 Drop(s) Both EYES two times a day    MEDICATIONS  (PRN):  diclofenac 75 milliGRAM(s) Oral two times a day PRN Moderate Pain (4 - 6)

## 2023-03-01 NOTE — PROGRESS NOTE ADULT - ASSESSMENT
89 year old man with HTN, BPH, arthritis, presented 2/24 with exertional dyspnea and B/L LE edema, found to have new onset afib with RVR and CHF. Admitted to Medicine.     Acute respiratory failure with hypoxia  Likely multifactorial due to decompensated CHF, pulmonary edema, B/L pleural effusions, atelectasis, pericardial effusion, and now diagnosis of COVID19. On supplemental oxygen. Stable.  - Continue to manage underlying issues, see below    COVID19 infection, unable to rule out COVID19 pneumonia  Appreciate input from ID.  - Started patient on remdesivir 3/1, plan for 5 day course  - Started patient on dexamethasone 6mg daily 3/1, plan for 10 day course  - Incentive spirometry  - Encourage mobility  - DVT px    New onset afib with RVR  Remains in afib, rate similar to presentation, 90s-100s. Also with significant PVC burden and today he had an 11 sec run of wide complex tachycardia, likely NSVT. Appreciate input from Cardiology. Metoprolol for rate control. Full dose lovenox for AC, held in preparation for cardiac cath.   - Continue metoprolol  - Anticipate that he may need GIRMA/DCCV in effort to restore sinus rhythm  - Maintain K ~4, Mg ~ 2  - Restart full dose AC after cath    Acute systolic CHF  BNP 11,157. LVEF 35-40% on TTE. Also noted to have Mod MR, mod to severe TR, pHTN. Appreciate input from Cardiology.   - Planned for LHC today to assess for occlusive coronary artery disease  - Continue metoprolol  - Continue furosemide  - Unable to use ACEI/ARB/ARNI at the present due to CrCl  - Strict Is and Os, daily wt, fluid restriction    Pulmonary HTN  As noted on TTE  - Cardiology recommending RHC in addition to planned LHC, will follow    HTN  BP suboptimally controlled, and HR suboptimally controlled. Cardiology increased beta blocker.  - Continue to monitor    Pericardial effusion  Rather small in side. No sign of tamponade.   - Continue to monitor    BPH  Stable  - Continue tamsulosin    Physical deconditioning and debility  PT consulted  - F/u PT eval   89 year old man with HTN, BPH, arthritis, presented 2/24 with exertional dyspnea and B/L LE edema, found to have new onset afib with RVR and CHF. Admitted to Medicine.     Acute respiratory failure with hypoxia  Likely multifactorial due to decompensated CHF, pulmonary edema, B/L pleural effusions, atelectasis, pericardial effusion, and now diagnosis of COVID19. On supplemental oxygen. Stable.  - Continue to manage underlying issues, see below    COVID19 infection, unable to rule out COVID19 pneumonia  Appreciate input from ID.  - Started patient on remdesivir 3/1, plan for 5 day course  - Started patient on dexamethasone 6mg daily 3/1, plan for 10 day course  - Incentive spirometry  - Encourage mobility  - DVT px    New onset afib with RVR  Remains in afib, rate similar to presentation, 90s-100s. Also with significant PVC burden and today he had an 11 sec run of wide complex tachycardia, likely NSVT. Appreciate input from Cardiology. Metoprolol for rate control. Full dose lovenox for AC, held in preparation for cardiac cath.   - Continue metoprolol  - Anticipate that he may need GIRMA/DCCV in effort to restore sinus rhythm  - Maintain K ~4, Mg ~ 2  - Restart full dose AC after cath    Acute systolic CHF  BNP 11,157. LVEF 35-40% on TTE. Also noted to have Mod MR, mod to severe TR, pHTN. Appreciate input from Cardiology.   - Planned for LHC today to assess for occlusive coronary artery disease  - Continue metoprolol  - Continue furosemide  - Unable to use ACEI/ARB/ARNI at the present due to CrCl  - Strict Is and Os, daily wt, fluid restriction    Pulmonary HTN  As noted on TTE  - Cardiology recommending RHC in addition to planned LHC, will follow    HTN  BP suboptimally controlled, and HR suboptimally controlled. Cardiology increased beta blocker.  - Continue to monitor    Pericardial effusion  Rather small in side. No sign of tamponade.   - Continue to monitor    Abnormal urinalysis  UA with negative nitrite, small LE, >50 RBC, 11-25 WBCs, few bact. No dysuria or gross hematuria. No suprapubic pain for flank pain. Urine culture in process. Monitoring off antibiotics.   - F/u urine culture    BPH  Stable  - Continue tamsulosin    Physical deconditioning and debility  PT consulted  - F/u PT eval   89 year old man with HTN, BPH, arthritis, presented 2/24 with exertional dyspnea and B/L LE edema, found to have new onset afib with RVR and CHF. Admitted to Medicine.     Acute respiratory failure with hypoxia  Likely multifactorial due to decompensated CHF, pulmonary edema, B/L pleural effusions, atelectasis, pericardial effusion, and now diagnosis of COVID19. On supplemental oxygen. Stable.  - Continue to manage underlying issues, see below    COVID19 infection, unable to rule out COVID19 pneumonia  Appreciate input from ID.  - Started patient on remdesivir 3/1, plan for 5 day course  - Started patient on dexamethasone 6mg daily 3/1, plan for 10 day course  - Incentive spirometry  - Encourage mobility  - DVT px    New onset afib with RVR  Remains in afib, rate similar to presentation, 90s-100s. Also with significant PVC burden and today he had an 11 sec run of wide complex tachycardia, likely NSVT. Appreciate input from Cardiology. Metoprolol for rate control. Full dose lovenox for AC, held in preparation for cardiac cath.   - Continue metoprolol  - Anticipate that he may need GIRMA/DCCV in effort to restore sinus rhythm  - Maintain K ~4, Mg ~ 2  - Restart full dose AC after cath    Acute systolic CHF  BNP 11,157. LVEF 35-40% on TTE. Also noted to have Mod MR, mod to severe TR, pHTN. Appreciate input from Cardiology.   - Planned for LHC 3/2 to assess for occlusive coronary artery disease  - Continue metoprolol  - Continue furosemide  - Unable to use ACEI/ARB/ARNI at the present due to CrCl  - Strict Is and Os, daily wt, fluid restriction    Pulmonary HTN  As noted on TTE  - Cardiology recommending RHC in addition to planned LHC, will follow    HTN  BP suboptimally controlled, and HR suboptimally controlled. Cardiology increased beta blocker.  - Continue to monitor    Pericardial effusion  Rather small in side. No sign of tamponade.   - Continue to monitor    Abnormal urinalysis  UA with negative nitrite, small LE, >50 RBC, 11-25 WBCs, few bact. No dysuria or gross hematuria. No suprapubic pain for flank pain. Urine culture in process. Monitoring off antibiotics.   - F/u urine culture    BPH  Stable  - Continue tamsulosin    Physical deconditioning and debility  PT consulted  - F/u PT eval   89 year old man with HTN, BPH, arthritis, presented 2/24 with exertional dyspnea and B/L LE edema, found to have new onset afib with RVR and CHF. Admitted to Medicine.     Acute respiratory failure with hypoxia  Likely multifactorial due to decompensated CHF, pulmonary edema, B/L pleural effusions, atelectasis, pericardial effusion, and now diagnosis of COVID19. On supplemental oxygen. Stable.  - Continue to manage underlying issues, see below    COVID19 infection, unable to rule out COVID19 pneumonia  Appreciate input from ID.  - Started patient on remdesivir 3/1, plan for 5 day course  - Started patient on dexamethasone 6mg daily 3/1, plan for 10 day course  - Incentive spirometry  - Encourage mobility  - DVT px    New onset afib with RVR  Remains in afib, rate similar to presentation, 90s-100s. Also with significant PVC burden and today he had an 11 sec run of wide complex tachycardia, likely NSVT. Appreciate input from Cardiology. Metoprolol for rate control. Full dose lovenox for AC, held in preparation for cardiac cath.   - Continue metoprolol  - Anticipate that he may need GIRMA/DCCV in effort to restore sinus rhythm  - Maintain K ~4, Mg ~ 2  - Restart full dose AC after cath    Acute systolic CHF  BNP 11,157. LVEF 35-40% on TTE. Also noted to have Mod MR, mod to severe TR, pHTN. Appreciate input from Cardiology.   - Planned for LHC 3/2 to assess for occlusive coronary artery disease  - Continue metoprolol  - Continue furosemide  - Unable to use ACEI/ARB/ARNI at the present due to CrCl  - Strict Is and Os, daily wt, fluid restriction    Pulmonary HTN  As noted on TTE  - Cardiology recommending RHC in addition to planned LHC, will follow    HTN  BP suboptimally controlled, and HR suboptimally controlled. Cardiology increased beta blocker.  - Continue to monitor    Pericardial effusion  Rather small in side. No sign of tamponade.   - Continue to monitor    Abnormal urinalysis  UA with negative nitrite, small LE, >50 RBC, 11-25 WBCs, few bact. No dysuria or gross hematuria. No suprapubic pain for flank pain. Urine culture negative.   - Monitoring off antibiotics.     BPH  Stable  - Continue tamsulosin    Physical deconditioning and debility  PT consulted  - F/u PT eval

## 2023-03-01 NOTE — PROGRESS NOTE ADULT - PROBLEM SELECTOR PLAN 2
Moderate LV dysfunction 35-40% with segmental wall motion abnormalities on echo raising suspicion for underlying CAD; elevated [id number]=[id number]  Unable to use ACEI/ARB/ARNI due to CKD - cont. BB  cont. diuresis with lasix 40 mg PO daily, daily weight, strict I&Os, fluid restriction 2L/day  LHC today - consider RHC given elevated PSAP

## 2023-03-01 NOTE — PROGRESS NOTE ADULT - SUBJECTIVE AND OBJECTIVE BOX
CHIEF COMPLAINT: Patient is a 89y old  Male who presents with a chief complaint of SOB    HPI: 89 year old man with a history of hypertension, BPH, and arthritis who presented to the ED after seeing his PCP (Dr Umanzor) earlier today because of shortness of breath.  He hasn't felt well for a few weeks -- predominant complaint was dyspnea that worsened with activity and alleviated with rest; associated with the onset of bilateral leg edema.  He reports no past history of heart disease. In the ED he was diagnosed with new-onset atrial fibrillation and CHF.  He is presently comfortable (at rest); denies orthopnea/weight gain/angina.    3/27/23: no complaints, Tele: Afib , npo p midnight for LHC tomorrow    2/28/23: Patient awake without complaints of cp or sob.  LHC postponed due to elevated creatinine.  Tele: Afib 90's-120, frequent PVC's, couplets, triplets  3/1/23: no complaints; Tele: Afib, PVCs, now with COVID, will go for LHC later today     MEDICATIONS  (STANDING):  aspirin enteric coated 81 milliGRAM(s) Oral daily  furosemide    Tablet 40 milliGRAM(s) Oral daily  heparin   Injectable 5000 Unit(s) SubCutaneous every 12 hours  latanoprost 0.005% Ophthalmic Solution 1 Drop(s) Both EYES at bedtime  melatonin 5 milliGRAM(s) Oral at bedtime  metoprolol tartrate 25 milliGRAM(s) Oral every 8 hours  multivitamin 1 Tablet(s) Oral daily  tamsulosin 0.8 milliGRAM(s) Oral at bedtime  timolol 0.5% Solution 1 Drop(s) Both EYES two times a day    MEDICATIONS  (PRN):  diclofenac 75 milliGRAM(s) Oral two times a day PRN Moderate Pain (4 - 6)    MEDICATIONS  (STANDING):  aspirin enteric coated 81 milliGRAM(s) Oral daily  dexAMETHasone     Tablet 6 milliGRAM(s) Oral daily  furosemide    Tablet 40 milliGRAM(s) Oral daily  heparin   Injectable 5000 Unit(s) SubCutaneous every 12 hours  latanoprost 0.005% Ophthalmic Solution 1 Drop(s) Both EYES at bedtime  melatonin 5 milliGRAM(s) Oral at bedtime  metoprolol tartrate 37.5 milliGRAM(s) Oral every 8 hours  multivitamin 1 Tablet(s) Oral daily  remdesivir  IVPB   IV Intermittent   remdesivir  IVPB 200 milliGRAM(s) IV Intermittent every 24 hours  tamsulosin 0.8 milliGRAM(s) Oral at bedtime  timolol 0.5% Solution 1 Drop(s) Both EYES two times a day      PHYSICAL EXAM:  Constitutional: NAD, awake and alert, appears stated age  HEENT:   No oral cyanosis.  Pulmonary: Non-labored, breath sounds are clear bilaterally, No wheezing, rales or rhonchi  Cardiovascular: Irregular, normal S1/S2  Gastrointestinal: Bowel Sounds present, soft, nontender.   Lymph: Pitting b/l leg edema.   Neurological: Alert, no focal deficits  Skin: No rashes.  Psych:  Mood & affect appropriate    LABS:          03-01    143  |  108  |  69<H>  ----------------------------<  125<H>  4.2   |  28  |  1.48<H>    Ca    9.2      01 Mar 2023 06:27  Mg     2.5     02-28    TPro  6.5  /  Alb  3.4  /  TBili  1.2  /  DBili  x   /  AST  30  /  ALT  42  /  AlkPhos  85  02-28    - TroponinI hsT: <-74.67, <-90.54    02-28    140  |  106  |  68<H>  ----------------------------<  154<H>  3.6   |  26  |  1.76<H>    Ca    9.2      28 Feb 2023 07:32  Mg     2.5     02-28    TPro  6.5  /  Alb  3.4  /  TBili  1.2  /  DBili  x   /  AST  30  /  ALT  42  /  AlkPhos  85  02-28      139    |  109    |  55     ----------------------------<  110    4.2     |  25     |  1.32     Ca    9.3        24 Feb 2023 15:20  Mg     2.5       24 Feb 2023 15:20    TPro  7.0    /  Alb  3.6    /  TBili  1.0    /  DBili  x      /  AST  18     /  ALT  24     /  AlkPhos  82     24 Feb 2023 15:20    PT/INR - ( 24 Feb 2023 15:20 )   PT: 14.2 sec;   INR: 1.22 ratio    PTT - ( 24 Feb 2023 15:20 )  PTT:31.3 sec    Pro Bnp [id number]    ECG: AF, RVR, RBBB      < from: TTE Echo Complete w/o Contrast w/ Doppler (02.25.23 @ 08:52) >   Impression     Summary     The mitral valve leaflets appear thickened.   Moderate (2+) mitral regurgitation ispresent.   Mild aortic sclerosis is present with normal valvular opening.   Trace aortic regurgitation is present.   Moderate to severe (3+) tricuspid valve regurgitation is present.   Severe pulmonary hypertension.   The left atrium is mildly dilated.   Left ventricle systolic function appears impaired; segmental wall motion   abnormalities noted. Estimated Ejection Fraction is 35- 40%.   Mild concentric left ventricular hypertrophy is present.   The IVC is dilated with decreased respiratory variation.     Signature     ----------------------------------------------------------------   Electronically signed by Thad Camejo MD(Interpreting    < end of copied text >

## 2023-03-02 DIAGNOSIS — I50.23 ACUTE ON CHRONIC SYSTOLIC (CONGESTIVE) HEART FAILURE: ICD-10-CM

## 2023-03-02 DIAGNOSIS — I27.20 PULMONARY HYPERTENSION, UNSPECIFIED: ICD-10-CM

## 2023-03-02 DIAGNOSIS — I34.0 NONRHEUMATIC MITRAL (VALVE) INSUFFICIENCY: ICD-10-CM

## 2023-03-02 DIAGNOSIS — R06.09 OTHER FORMS OF DYSPNEA: ICD-10-CM

## 2023-03-02 DIAGNOSIS — I49.3 VENTRICULAR PREMATURE DEPOLARIZATION: ICD-10-CM

## 2023-03-02 LAB
ANION GAP SERPL CALC-SCNC: 8 MMOL/L — SIGNIFICANT CHANGE UP (ref 5–17)
BASOPHILS # BLD AUTO: 0 K/UL — SIGNIFICANT CHANGE UP (ref 0–0.2)
BASOPHILS NFR BLD AUTO: 0 % — SIGNIFICANT CHANGE UP (ref 0–2)
BUN SERPL-MCNC: 71 MG/DL — HIGH (ref 7–23)
CALCIUM SERPL-MCNC: 9.1 MG/DL — SIGNIFICANT CHANGE UP (ref 8.5–10.1)
CHLORIDE SERPL-SCNC: 106 MMOL/L — SIGNIFICANT CHANGE UP (ref 96–108)
CO2 SERPL-SCNC: 29 MMOL/L — SIGNIFICANT CHANGE UP (ref 22–31)
CREAT SERPL-MCNC: 1.34 MG/DL — HIGH (ref 0.5–1.3)
EGFR: 51 ML/MIN/1.73M2 — LOW
EOSINOPHIL # BLD AUTO: 0 K/UL — SIGNIFICANT CHANGE UP (ref 0–0.5)
EOSINOPHIL NFR BLD AUTO: 0 % — SIGNIFICANT CHANGE UP (ref 0–6)
GLUCOSE SERPL-MCNC: 104 MG/DL — HIGH (ref 70–99)
HCT VFR BLD CALC: 37.1 % — LOW (ref 39–50)
HGB BLD-MCNC: 11.7 G/DL — LOW (ref 13–17)
IMM GRANULOCYTES NFR BLD AUTO: 0.7 % — SIGNIFICANT CHANGE UP (ref 0–0.9)
INR BLD: 1.29 RATIO — HIGH (ref 0.88–1.16)
LYMPHOCYTES # BLD AUTO: 0.44 K/UL — LOW (ref 1–3.3)
LYMPHOCYTES # BLD AUTO: 6.5 % — LOW (ref 13–44)
MAGNESIUM SERPL-MCNC: 2.4 MG/DL — SIGNIFICANT CHANGE UP (ref 1.6–2.6)
MCHC RBC-ENTMCNC: 28.8 PG — SIGNIFICANT CHANGE UP (ref 27–34)
MCHC RBC-ENTMCNC: 31.5 GM/DL — LOW (ref 32–36)
MCV RBC AUTO: 91.4 FL — SIGNIFICANT CHANGE UP (ref 80–100)
MONOCYTES # BLD AUTO: 0.67 K/UL — SIGNIFICANT CHANGE UP (ref 0–0.9)
MONOCYTES NFR BLD AUTO: 9.9 % — SIGNIFICANT CHANGE UP (ref 2–14)
NEUTROPHILS # BLD AUTO: 5.58 K/UL — SIGNIFICANT CHANGE UP (ref 1.8–7.4)
NEUTROPHILS NFR BLD AUTO: 82.9 % — HIGH (ref 43–77)
PLATELET # BLD AUTO: 137 K/UL — LOW (ref 150–400)
POTASSIUM SERPL-MCNC: 3.6 MMOL/L — SIGNIFICANT CHANGE UP (ref 3.5–5.3)
POTASSIUM SERPL-SCNC: 3.6 MMOL/L — SIGNIFICANT CHANGE UP (ref 3.5–5.3)
PROTHROM AB SERPL-ACNC: 15 SEC — HIGH (ref 10.5–13.4)
RBC # BLD: 4.06 M/UL — LOW (ref 4.2–5.8)
RBC # FLD: 15.5 % — HIGH (ref 10.3–14.5)
SODIUM SERPL-SCNC: 143 MMOL/L — SIGNIFICANT CHANGE UP (ref 135–145)
WBC # BLD: 6.74 K/UL — SIGNIFICANT CHANGE UP (ref 3.8–10.5)
WBC # FLD AUTO: 6.74 K/UL — SIGNIFICANT CHANGE UP (ref 3.8–10.5)

## 2023-03-02 PROCEDURE — 99223 1ST HOSP IP/OBS HIGH 75: CPT

## 2023-03-02 PROCEDURE — 93458 L HRT ARTERY/VENTRICLE ANGIO: CPT | Mod: 26

## 2023-03-02 PROCEDURE — 99233 SBSQ HOSP IP/OBS HIGH 50: CPT

## 2023-03-02 PROCEDURE — 93010 ELECTROCARDIOGRAM REPORT: CPT

## 2023-03-02 RX ORDER — POTASSIUM CHLORIDE 20 MEQ
20 PACKET (EA) ORAL ONCE
Refills: 0 | Status: COMPLETED | OUTPATIENT
Start: 2023-03-02 | End: 2023-03-02

## 2023-03-02 RX ORDER — APIXABAN 2.5 MG/1
5 TABLET, FILM COATED ORAL EVERY 12 HOURS
Refills: 0 | Status: DISCONTINUED | OUTPATIENT
Start: 2023-03-02 | End: 2023-03-07

## 2023-03-02 RX ORDER — AMIODARONE HYDROCHLORIDE 400 MG/1
400 TABLET ORAL
Refills: 0 | Status: DISCONTINUED | OUTPATIENT
Start: 2023-03-02 | End: 2023-03-07

## 2023-03-02 RX ORDER — METOPROLOL TARTRATE 50 MG
25 TABLET ORAL
Refills: 0 | Status: DISCONTINUED | OUTPATIENT
Start: 2023-03-02 | End: 2023-03-07

## 2023-03-02 RX ORDER — HEPARIN SODIUM 5000 [USP'U]/ML
3500 INJECTION INTRAVENOUS; SUBCUTANEOUS EVERY 6 HOURS
Refills: 0 | Status: DISCONTINUED | OUTPATIENT
Start: 2023-03-02 | End: 2023-03-02

## 2023-03-02 RX ORDER — HEPARIN SODIUM 5000 [USP'U]/ML
7000 INJECTION INTRAVENOUS; SUBCUTANEOUS EVERY 6 HOURS
Refills: 0 | Status: DISCONTINUED | OUTPATIENT
Start: 2023-03-02 | End: 2023-03-02

## 2023-03-02 RX ORDER — HEPARIN SODIUM 5000 [USP'U]/ML
INJECTION INTRAVENOUS; SUBCUTANEOUS
Qty: 25000 | Refills: 0 | Status: DISCONTINUED | OUTPATIENT
Start: 2023-03-02 | End: 2023-03-02

## 2023-03-02 RX ORDER — HEPARIN SODIUM 5000 [USP'U]/ML
7000 INJECTION INTRAVENOUS; SUBCUTANEOUS ONCE
Refills: 0 | Status: DISCONTINUED | OUTPATIENT
Start: 2023-03-02 | End: 2023-03-02

## 2023-03-02 RX ORDER — ATORVASTATIN CALCIUM 80 MG/1
40 TABLET, FILM COATED ORAL AT BEDTIME
Refills: 0 | Status: DISCONTINUED | OUTPATIENT
Start: 2023-03-02 | End: 2023-03-07

## 2023-03-02 RX ORDER — METOPROLOL TARTRATE 50 MG
5 TABLET ORAL ONCE
Refills: 0 | Status: COMPLETED | OUTPATIENT
Start: 2023-03-02 | End: 2023-03-02

## 2023-03-02 RX ADMIN — Medication 6 MILLIGRAM(S): at 10:11

## 2023-03-02 RX ADMIN — REMDESIVIR 200 MILLIGRAM(S): 5 INJECTION INTRAVENOUS at 18:26

## 2023-03-02 RX ADMIN — Medication 5 MILLIGRAM(S): at 07:03

## 2023-03-02 RX ADMIN — Medication 20 MILLIEQUIVALENT(S): at 10:11

## 2023-03-02 RX ADMIN — Medication 1 TABLET(S): at 10:11

## 2023-03-02 RX ADMIN — ATORVASTATIN CALCIUM 40 MILLIGRAM(S): 80 TABLET, FILM COATED ORAL at 21:34

## 2023-03-02 RX ADMIN — LATANOPROST 1 DROP(S): 0.05 SOLUTION/ DROPS OPHTHALMIC; TOPICAL at 23:09

## 2023-03-02 RX ADMIN — APIXABAN 5 MILLIGRAM(S): 2.5 TABLET, FILM COATED ORAL at 21:34

## 2023-03-02 RX ADMIN — TAMSULOSIN HYDROCHLORIDE 0.8 MILLIGRAM(S): 0.4 CAPSULE ORAL at 21:37

## 2023-03-02 RX ADMIN — Medication 81 MILLIGRAM(S): at 10:11

## 2023-03-02 RX ADMIN — Medication 25 MILLIGRAM(S): at 21:36

## 2023-03-02 RX ADMIN — Medication 37.5 MILLIGRAM(S): at 04:53

## 2023-03-02 RX ADMIN — Medication 40 MILLIGRAM(S): at 13:40

## 2023-03-02 RX ADMIN — Medication 1 DROP(S): at 04:54

## 2023-03-02 RX ADMIN — Medication 37.5 MILLIGRAM(S): at 13:40

## 2023-03-02 RX ADMIN — Medication 5 MILLIGRAM(S): at 21:33

## 2023-03-02 RX ADMIN — AMIODARONE HYDROCHLORIDE 400 MILLIGRAM(S): 400 TABLET ORAL at 21:36

## 2023-03-02 RX ADMIN — Medication 1 DROP(S): at 18:28

## 2023-03-02 NOTE — CONSULT NOTE ADULT - ASSESSMENT
- cont O2  - patient has severe pulmonary HTN (RVSP 60) in setting of HFrEF and mitral regurg  - has right pleural effussion  - pulmonary HTN most likely due to left heart failure  - would not do right heart cath  - optimize heart failure treatment  - may need thoracentesis  - on remdesivir/decadron for covid

## 2023-03-02 NOTE — CONSULT NOTE ADULT - SUBJECTIVE AND OBJECTIVE BOX
HPI:   89 yo male w/ PMHx of CHF, HTN on lisinopril/HTZ,  BPH  who was sent by his doctor, Dr. Umanzor  to  [place] ED  with complaint of  increasing dyspnea on exertion, dizziness and new onset afib.   Pt reported dyspnea over the last 2 weeks.   He now also has dyspnea  at rest and has noticed increased leg swelling, as noted by his daughters here at bedside in the ED.   Pt has not been on lasix.     Pt denies cp, cough, no abd pain, no n/v/d, no night time orthopnea or PND,  no palpitations, no f/c, no resp or urinary complaints.    Pt found to have AF w/RVR/CHF, echo showed LVEF 35-40%, also diagnosed (+)COVID on remdesivir& steroids.  s/p LHC today- non obstructive CAD    echo (23) LVEF 35-40%, 2+MR, 3+ TR, sev pul HTN  tele:  episode of Aflutter 140's bpm for 1 hr at 6AM, then remains in SR 60-70's bpm, frequent PVCs, couplets  EKG (3/2/23)  SR 75bpm AK 170ms, QRS 186ms, QT 470ms, QTC 524ms (corrected for RBBB 438ms)     Pt is resting in the bed, denies chest pain/SOB/palpitations today      PAST MEDICAL & SURGICAL HISTORY:  HTN (hypertension)      History of BPH      History of mastoidectomy          FAMILY HISTORY:  FH: type 2 diabetes mellitus (Father)    Family hx of hypertension (Mother)    Family history of hypertension (Father)        SOCIAL HISTORY: live with wife at home  Allergies    No Known Allergies    Intolerances      MEDICATIONS  (STANDING):  apixaban 5 milliGRAM(s) Oral every 12 hours  aspirin enteric coated 81 milliGRAM(s) Oral daily  atorvastatin 40 milliGRAM(s) Oral at bedtime  dexAMETHasone     Tablet 6 milliGRAM(s) Oral daily  furosemide    Tablet 40 milliGRAM(s) Oral daily  latanoprost 0.005% Ophthalmic Solution 1 Drop(s) Both EYES at bedtime  melatonin 5 milliGRAM(s) Oral at bedtime  metoprolol tartrate 37.5 milliGRAM(s) Oral every 8 hours  multivitamin 1 Tablet(s) Oral daily  remdesivir  IVPB 100 milliGRAM(s) IV Intermittent every 24 hours  remdesivir  IVPB   IV Intermittent   tamsulosin 0.8 milliGRAM(s) Oral at bedtime  timolol 0.5% Solution 1 Drop(s) Both EYES two times a day    MEDICATIONS  (PRN):  diclofenac 75 milliGRAM(s) Oral two times a day PRN Moderate Pain (4 - 6)    ROS: All other ROS is negative unless indicated above.    Physical Exam:  Vital Signs Last 24 Hrs  T(C): 36.2 (02 Mar 2023 13:28), Max: 36.7 (01 Mar 2023 20:53)  T(F): 97.1 (02 Mar 2023 13:28), Max: 98 (01 Mar 2023 20:53)  HR: 80 (02 Mar 2023 13:28) (70 - 130)  BP: 145/78 (02 Mar 2023 13:28) (103/63 - 145/78)  RR: 22 (02 Mar 2023 13:28) (18 - 22)  SpO2: 98% (02 Mar 2023 13:28) (93% - 99%)    Parameters below as of 02 Mar 2023 13:28  Patient On (Oxygen Delivery Method): nasal cannula  O2 Flow (L/min): 2              Constitutional: well developed,  no deformities and no acute distress    Neurological: Alert & Oriented x 3, CARRILLO, no focal deficits    HEENT: NC/AT, PERRLA, EOMI,  Neck supple.    Respiratory: midly diminished in bases    Cardiovascular: (+) S1 & S2, RRR, (+) CRISTAL    Gastrointestinal: soft, NT, nondistended, (+) BS    Genitourinary: non distended bladder, voiding freely    Extremities: (+)B/L pedal edema      LABS:                        11.7   6.74  )-----------( 137      ( 02 Mar 2023 06:49 )             37.1     03-02    143  |  106  |  71<H>  ----------------------------<  104<H>  3.6   |  29  |  1.34<H>    Ca    9.1      02 Mar 2023 06:49  Mg     2.4     03-02    TPro  6.4  /  Alb  3.1<L>  /  TBili  1.3<H>  /  DBili  0.5<H>  /  AST  34  /  ALT  47  /  AlkPhos  83  03-02    PT/INR - ( 02 Mar 2023 06:50 )   PT: 15.0 sec;   INR: 1.29 ratio           Urinalysis Basic - ( 2023 18:40 )    Color: Yellow / Appearance: Clear / S.020 / pH: x  Gluc: x / Ketone: Negative  / Bili: Negative / Urobili: Negative   Blood: x / Protein: 30 mg/dL / Nitrite: Negative   Leuk Esterase: Small / RBC: >50 /HPF / WBC 11-25 /HPF   Sq Epi: x / Non Sq Epi: Few / Bacteria: Few         HPI:   89 yo male w/ PMHx of CHF, HTN on lisinopril/HTZ,  BPH  who was sent by his doctor, Dr. Umanzor  to  [facility]  with complaint of  increasing dyspnea on exertion, dizziness and new onset afib.   Pt reported dyspnea over the last 2 weeks.   He now also has dyspnea  at rest and has noticed increased leg swelling, as noted by his daughters here at bedside in the ED.   Pt has not been on lasix.     Pt denies cp, cough, no abd pain, no n/v/d, no night time orthopnea or PND,  no palpitations, no f/c, no resp or urinary complaints.    Pt found to have AF w/RVR/CHF, echo showed LVEF 35-40%, also diagnosed (+)COVID on remdesivir& steroids.  s/p LHC today- non obstructive CAD    Echo (23) LVEF 35-40%, 2+MR, 3+ TR, sev pul HTN  tele:  episode of Aflutter 140's bpm for 1 hr at 6AM, then remains in SR 60-70's bpm, frequent PVCs, couplets  EKG (23) Afib 106 bpm with PVC, QRS 172ms, RBBB  EKG (3/2/23)  SR 75bpm with PVC, AK 170ms, QRS 186ms, QT 470ms, QTC 524ms (corrected for RBBB 438ms)     Pt is resting in the bed, denies chest pain/SOB/palpitations today      PAST MEDICAL & SURGICAL HISTORY:  HTN (hypertension)      History of BPH      History of mastoidectomy          FAMILY HISTORY:  FH: type 2 diabetes mellitus (Father)    Family hx of hypertension (Mother)    Family history of hypertension (Father)        SOCIAL HISTORY: live with wife at home  Allergies    No Known Allergies    Intolerances      MEDICATIONS  (STANDING):  apixaban 5 milliGRAM(s) Oral every 12 hours  aspirin enteric coated 81 milliGRAM(s) Oral daily  atorvastatin 40 milliGRAM(s) Oral at bedtime  dexAMETHasone     Tablet 6 milliGRAM(s) Oral daily  furosemide    Tablet 40 milliGRAM(s) Oral daily  latanoprost 0.005% Ophthalmic Solution 1 Drop(s) Both EYES at bedtime  melatonin 5 milliGRAM(s) Oral at bedtime  metoprolol tartrate 37.5 milliGRAM(s) Oral every 8 hours  multivitamin 1 Tablet(s) Oral daily  remdesivir  IVPB 100 milliGRAM(s) IV Intermittent every 24 hours  remdesivir  IVPB   IV Intermittent   tamsulosin 0.8 milliGRAM(s) Oral at bedtime  timolol 0.5% Solution 1 Drop(s) Both EYES two times a day    MEDICATIONS  (PRN):  diclofenac 75 milliGRAM(s) Oral two times a day PRN Moderate Pain (4 - 6)    ROS: All other ROS is negative unless indicated above.    Physical Exam:  Vital Signs Last 24 Hrs  T(C): 36.2 (02 Mar 2023 13:28), Max: 36.7 (01 Mar 2023 20:53)  T(F): 97.1 (02 Mar 2023 13:28), Max: 98 (01 Mar 2023 20:53)  HR: 80 (02 Mar 2023 13:28) (70 - 130)  BP: 145/78 (02 Mar 2023 13:28) (103/63 - 145/78)  RR: 22 (02 Mar 2023 13:28) (18 - 22)  SpO2: 98% (02 Mar 2023 13:28) (93% - 99%)    Parameters below as of 02 Mar 2023 13:28  Patient On (Oxygen Delivery Method): nasal cannula  O2 Flow (L/min): 2              Constitutional: well developed,  no deformities and no acute distress    Neurological: Alert & Oriented x 3, CARRILLO, no focal deficits    HEENT: NC/AT, PERRLA, EOMI,  Neck supple.    Respiratory: midly diminished in bases    Cardiovascular: (+) S1 & S2, RRR, (+) CRISTAL    Gastrointestinal: soft, NT, nondistended, (+) BS    Genitourinary: non distended bladder, voiding freely    Extremities: (+)B/L pedal edema      LABS:                        11.7   6.74  )-----------( 137      ( 02 Mar 2023 06:49 )             37.1     03-02    143  |  106  |  71<H>  ----------------------------<  104<H>  3.6   |  29  |  1.34<H>    Ca    9.1      02 Mar 2023 06:49  Mg     2.4     03-02    TPro  6.4  /  Alb  3.1<L>  /  TBili  1.3<H>  /  DBili  0.5<H>  /  AST  34  /  ALT  47  /  AlkPhos  83  03-    PT/INR - ( 02 Mar 2023 06:50 )   PT: 15.0 sec;   INR: 1.29 ratio           Urinalysis Basic - ( 2023 18:40 )    Color: Yellow / Appearance: Clear / S.020 / pH: x  Gluc: x / Ketone: Negative  / Bili: Negative / Urobili: Negative   Blood: x / Protein: 30 mg/dL / Nitrite: Negative   Leuk Esterase: Small / RBC: >50 /HPF / WBC 11-25 /HPF   Sq Epi: x / Non Sq Epi: Few / Bacteria: Few

## 2023-03-02 NOTE — PHYSICAL THERAPY INITIAL EVALUATION ADULT - ACTIVE RANGE OF MOTION EXAMINATION, REHAB EVAL
srinivas. upper extremity Active ROM was WNL (within normal limits)/bilateral  lower extremity Active ROM was WFL (within functional limits)

## 2023-03-02 NOTE — PHYSICAL THERAPY INITIAL EVALUATION ADULT - LEVEL OF INDEPENDENCE: SIT/STAND, REHAB EVAL
new diaper applied in coordination with NA after recently toileted/minimum assist (75% patients effort)

## 2023-03-02 NOTE — PHYSICAL THERAPY INITIAL EVALUATION ADULT - NSACTIVITYREC_GEN_A_PT
out of bed to chair /bathroom as needed with RW/1PA,wean O2 as tolerated ,progressive functional strengthening/mobility with PT using RW ,wean assistive device as tolerated to least restrictive ; pt had partial giving way B knees /hips today after amb 35-40ft in room despite supp O2 ,appeared fatigued on exertion

## 2023-03-02 NOTE — CONSULT NOTE ADULT - ASSESSMENT
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  Also (+)COVID.  Pt converted to SR on 3/1 with paroxysmal episode of PAFlutter, frequent mutifocal PVCs on tele.  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  Also (+)COVID.  Pt converted to SR on 3/1 with paroxysmal episode of PAFlutter, frequent mutifocal PVCs on tele.  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  Also (+)COVID.  Pt converted to SR on 3/1 with paroxysmal episode of PAFlutter, frequent mutifocal PVCs on tele.  Pt had dizziness prior to admission but denies syncope  - recommend decrease metoprolol to 25mg po BID  -start amiodarone 400mg po BID for 2 weeks then decrease to 400mg daily.   Black box warning of Amiodarone discussed with patient. Will need out patient monitoring of LFT's, TSH, Opthalmology evaluations and Pulmonary Function Tests.  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  Also (+)COVID.  Pt converted to SR on 3/1 with paroxysmal episode of PAFlutter, frequent mutifocal PVCs on tele.  Pt had dizziness prior to admission but denies syncope  - recommend decrease metoprolol to 25mg po BID  -start amiodarone 400mg po BID for 2 weeks then decrease to 400mg daily.   Black box warning of Amiodarone discussed with patient. Will need out patient monitoring of LFT's, TSH, Opthalmology evaluations and Pulmonary Function Tests.  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  Also (+)COVID.  Pt converted to SR on 3/1 with paroxysmal episode of PAFlutter, frequent mutifocal PVCs on tele.  Pt had dizziness prior to admission but denies syncope  - recommend decrease metoprolol to 25mg po BID  -start amiodarone 400mg po BID for 2 weeks then decrease to 200mg daily.   Black box warning of Amiodarone discussed with patient. Will need out patient monitoring of LFT's, TSH, Opthalmology evaluations and Pulmonary Function Tests.  - agree with longterm OAC  - recommend ILR implant next Monday as a longterm telemetry for PAF /PVC burden   -GDMT for HFrEF  - maintain K+>4.0, Mg++>2.0, check TSH  - recommend repeat echo in 3months  Plan d/w pt//cardio/hospitalist

## 2023-03-02 NOTE — PHYSICAL THERAPY INITIAL EVALUATION ADULT - PRECAUTIONS/LIMITATIONS, REHAB EVAL
+COVID 2/27/23, on supplemental o2/fall precautions/isolation precautions/oxygen therapy device and L/min

## 2023-03-02 NOTE — PHYSICAL THERAPY INITIAL EVALUATION ADULT - LEVEL OF INDEPENDENCE, REHAB EVAL
out of bed to chair before and after encounter in upright recliner chair , prefers to be postured seated erect vs reclined

## 2023-03-02 NOTE — PHYSICAL THERAPY INITIAL EVALUATION ADULT - GENERAL OBSERVATIONS, REHAB EVAL
pt is sitting up in bedside chair with supplemental O2 via NC, completed cardiac cath earlier today 3/2 via R radial access without complications and no interventions needed; pt reports he does not normally require supplemental O2 ,lives with wife who suffers dementia

## 2023-03-02 NOTE — PROGRESS NOTE ADULT - NS ATTEND AMEND GEN_ALL_CORE FT
Case discussed with Danielle Rivas and Olga.    Nonobstructive CAD -- LV dysfunction could be related to AF and/or very frequent PVCs.  Await input from EP.  GIRMA/DCCV when respiratory status improved.  Elevated LVEDP suggests need for more diuresis. Case discussed with Danielle Rivas and Olga.    Nonobstructive CAD -- LV dysfunction could be related to AF and/or very frequent PVCs.  Await input from EP.  Currently in sinus rhythm.   Elevated LVEDP suggests need for more diuresis.

## 2023-03-02 NOTE — PROGRESS NOTE ADULT - ASSESSMENT
89 year old man with HTN, BPH, arthritis, presented 2/24 with exertional dyspnea and B/L LE edema, found to have new onset afib with RVR and CHF. Admitted to Medicine.     Acute respiratory failure with hypoxia  Likely multifactorial due to decompensated CHF, pulmonary edema, B/L pleural effusions, atelectasis, pericardial effusion, and now diagnosis of COVID19. On supplemental oxygen. Stable.  - Continue to manage underlying issues, see below    COVID19 infection, unable to rule out COVID19 pneumonia  Appreciate input from ID.  - Started patient on remdesivir 3/1, plan for 5 day course  - Started patient on dexamethasone 6mg daily 3/1, plan for 10 day course  - Incentive spirometry  - Encourage mobility  - DVT px    New onset afib with RVR  Remains in afib, rate similar to presentation, 90s-100s. Also with significant PVC burden and today he had an 11 sec run of wide complex tachycardia, likely NSVT. Appreciate input from Cardiology. Metoprolol for rate control. Full dose lovenox for AC, held in preparation for cardiac cath.   - Continue metoprolol  - Anticipate that he may need GIRMA/DCCV in effort to restore sinus rhythm  - Maintain K ~4, Mg ~ 2  - Restart full dose AC after cath    Acute systolic CHF  BNP 11,157. LVEF 35-40% on TTE. Also noted to have Mod MR, mod to severe TR, pHTN. Appreciate input from Cardiology.   - Planned for LHC 3/2 to assess for occlusive coronary artery disease  - Continue metoprolol  - Continue furosemide  - Unable to use ACEI/ARB/ARNI at the present due to CrCl  - Strict Is and Os, daily wt, fluid restriction    Pulmonary HTN  As noted on TTE  - Cardiology recommending RHC in addition to planned LHC, will follow    HTN  BP suboptimally controlled, and HR suboptimally controlled. Cardiology increased beta blocker.  - Continue to monitor    Pericardial effusion  Rather small in side. No sign of tamponade.   - Continue to monitor    Abnormal urinalysis  UA with negative nitrite, small LE, >50 RBC, 11-25 WBCs, few bact. No dysuria or gross hematuria. No suprapubic pain for flank pain. Urine culture negative.   - Monitoring off antibiotics.     BPH  Stable  - Continue tamsulosin    Physical deconditioning and debility  PT consulted  - F/u PT eval   89 year old man with HTN, BPH, arthritis, presented 2/24 with exertional dyspnea and B/L LE edema, found to have new onset afib with RVR and CHF. Admitted to Medicine.     Acute respiratory failure with hypoxia  Likely multifactorial due to decompensated CHF, pulmonary edema, B/L pleural effusions, atelectasis, pericardial effusion, and now diagnosis of COVID19. On supplemental oxygen. Stable.  - Continue to manage underlying issues, see below    COVID19 infection, unable to rule out COVID19 pneumonia  Appreciate input from ID. Started patient on remdesivir and dexamethasone 3/1.   - Continue remdesivir, (3/1-), plan for 5-day course  - Continue dexamethasone (3/1-), plan for 10-day course  - Incentive spirometry  - Encourage mobility  - DVT px    New onset afib with RVR, PVCs, prolonged QTC  Converted to NSR 3/1 but with paroxysms of atrial fibrillation and periods of rapid response, significant PVC burden, prolonged QTC and infrequent wide complex tachycardia, likely NSVT. Appreciate input from Cardiology and Cardiac EP.   - Decreased metoprolol to 25mg PO BID  - Started amiodarone 400mg po BID for 2 weeks then decrease to 400mg daily. Black box warning of Amiodarone discussed with patient. Will need out patient monitoring of LFT's, TSH, Opthalmology evaluations and PFTs.  - Continue long-term oral anticoagulation, on Eliquis  - ILR implantation, possibly on Monday as a long-term telemetry for AFib / PVC burden   - Maintain K ~4, Mg ~2  - Ordered TSH for AM  - Plan to repeat echo in 3 months  - Outpatient follow up with Dr. Carranza    Acute systolic CHF  BNP 11,157. LVEF 35-40% on TTE. Also noted to have Mod MR, mod to severe TR, pHTN. Appreciate input from Cardiology. L heart cath 3/2. Non-obstructive coronary artery disease with heavy calcification. LVEDP 21. Continued on diuretics.   - Continue furosemide  - Continue metoprolol  - Unable to use ACEI/ARB/ARNI at the present due to CrCl  - Strict Is and Os, daily wt, fluid restriction    Pulmonary HTN  As noted on TTE. Cardiac cath done, elevated LVED noted.   - Continue management of CHF    HTN  BP suboptimally controlled, and HR suboptimally controlled. Cardiology increased beta blocker.  - Continue to monitor    Pericardial effusion  Rather small in side. No sign of tamponade.   - Continue to monitor    Abnormal urinalysis  UA with negative nitrite, small LE, >50 RBC, 11-25 WBCs, few bact. No dysuria or gross hematuria. No suprapubic pain for flank pain. Urine culture negative.   - Monitoring off antibiotics.     BPH  Stable  - Continue tamsulosin    Physical deconditioning and debility  PT consulted  - F/u PT eval      Dispo: Anticipate patient to remain inpatient into Monday 3/6 for monitoring amiodarone load in setting of heavy PVC burden, prolonged QTc, wide QRS.

## 2023-03-02 NOTE — PHYSICAL THERAPY INITIAL EVALUATION ADULT - IMPAIRMENTS FOUND, PT EVAL
BLE edema distal to knees/aerobic capacity/endurance/anthropometric characteristics/gait, locomotion, and balance/muscle strength/poor safety awareness/ventilation and respiration/gas exchange

## 2023-03-02 NOTE — PROGRESS NOTE ADULT - PROBLEM SELECTOR PLAN 1
Rate suboptimally controlled (tachycardic with exertion; occasional pauses) and in the setting of moderate LV systolic dysfunction;  s/p LHC  on 3/2 revealing non obstructive CAD - results as above - start eliquis 5 mg po q12hrs, cont, BB, no ACEI/ARB due to labile kidney function, start lipitor 40 mg po HS, tele with frequent ectopy - EP consult, plan for GIRMA/CV on Monday

## 2023-03-02 NOTE — PROGRESS NOTE ADULT - SUBJECTIVE AND OBJECTIVE BOX
Chief Complaint: Shortness of breath with exertion, bilateral leg swelling    Interval Hx: Had afib with RVR early this AM for which he received a dose of Lopressor. HR now better controlled, 70 bpm. Patient seen out-of-bed, in arm chair at bedside. He has no dyspnea at rest. No chest pain or tightness. No fever or chills. Still with B/L LE edema. He is not sure whether his exertional dyspnea has improved much since admission because he has not been exerting himself much here. He remains on supplemental oxygen and is receiving COVID19 treatment with remdesivir and dexamethasone. He is planned for L and R heart cath today.     ROS: Multi system review is comprehensively negative x 10 systems except as above    Vitals:  T(F): 97.3 (02 Mar 2023 07:59), Max: 98 (01 Mar 2023 20:53)  HR: 70 (02 Mar 2023 12:40) (70 - 130)  BP: 115/68 (02 Mar 2023 12:40) (103/63 - 143/72)  RR: 18 (02 Mar 2023 12:40) (18 - 19)  SpO2: 97% (02 Mar 2023 12:40) (93% - 99%) on 2L/min via NC    Exam:  Gen: No acute distress  HEENT: NCAT PERRL EOMI MMM clear oropharynx  Neck: Supple, no LAD, no thyromegaly  Chest: Normal resp effort at rest, diminished breath sounds at bases B/L  CVS: s1 s2 normal, irregular, HR 70  Abd: +BS, soft NT ND   Ext: +B/L LE edema, no tenderness, normal cap refill  Skin: Warm, dry  Mood: calm, pleasant  Neuro: Awake and alert, answers questions appropriately, follows commands, no gross deficits    Labs:                      11.7   6.74 )-----------( 137             37.1       143  |  106  |  71  ----------------------< 104  3.6   |   29   |  1.34    Ca 9.1  Mg 2.4    TPro  6.4  /  Alb  3.1  /  TBili  1.3  /  DBili  0.5  /  AST  34  /  ALT  47  /  AlkPhos  83      PT: 15.0 sec;   INR: 1.29 ratio      Troponin 90, 75   proBNP 11,157   Ddimer 1163    Urinalysis 2/28: Yellow, clear, ket neg, prot 30, N neg, LE small, RBC > 50, WBC 11-25, bact few    Micro:  COVID19 PCR 2/27: Positive   Resp pathogens PCR 2/24: Negative  COVID19 PCR 2/24: Negative    Imaging:  CXR 3/1: Gross heart enlargement again noted. There are slightly increasing right base pleural pulmonary process compared to February 24. Otherwise no change.    CTA chest W/ 2/24: There are no filling defects in the main pulmonary artery or its lobar and segmental branches. Cardiomegaly. There is a small-moderate circumferential pericardial effusion. The great vessels are normal in size. Moderate right and small left pleural effusions. There is mild groundglass opacity in the right upper lobe, likely secondary to pulmonary edema. Mild consolidation in the right lower lobe, likely atelectasis. The central airways are patent. No thoracic adenopathy. Fluid attenuating nodule in the left adrenal gland, possibly an adrenal adenoma. Perihepatic ascites. No aggressive osseous lesions.    CXR 2/24: Moderate enlargement of cardiac silhouette. Left lung is clear. Small right effusion/consolidation.    Cardiac Testing:  Tele 3/2: Afib with RVR this AM, HR now better controlled, 70s. Still lots of ectopy.     Tele 3/1: Afib, rates 80s-100s, PVCs, 11 sec of WCT likely NSVT    TTE 2/25:  The mitral valve leaflets appear thickened. Moderate (2+) mitral regurgitation present. Mild aortic sclerosis is present with normal valvular opening. Trace aortic regurgitation is present. Moderate to severe (3+) tricuspid valve regurgitation is present. Severe pulmonary hypertension. The left atrium is mildly dilated. Left ventricle systolic function appears impaired; segmental wall motion abnormalities noted. Estimated Ejection Fraction is 35- 40%. Mild concentric left ventricular hypertrophy is present. The IVC is dilated with decreased respiratory variation.    EKG 2/24: Rate 106. Atrial fibrillation with rapid ventricular response with premature ventricular or aberrantly conducted complexes. Right bundle branch block.    Meds:  MEDICATIONS  (STANDING):  apixaban 5 milliGRAM(s) Oral every 12 hours  aspirin enteric coated 81 milliGRAM(s) Oral daily  atorvastatin 40 milliGRAM(s) Oral at bedtime  dexAMETHasone     Tablet 6 milliGRAM(s) Oral daily  furosemide    Tablet 40 milliGRAM(s) Oral daily  latanoprost 0.005% Ophthalmic Solution 1 Drop(s) Both EYES at bedtime  melatonin 5 milliGRAM(s) Oral at bedtime  metoprolol tartrate 37.5 milliGRAM(s) Oral every 8 hours  multivitamin 1 Tablet(s) Oral daily  remdesivir  IVPB 100 milliGRAM(s) IV Intermittent every 24 hours  tamsulosin 0.8 milliGRAM(s) Oral at bedtime  timolol 0.5% Solution 1 Drop(s) Both EYES two times a day    MEDICATIONS  (PRN):  diclofenac 75 milliGRAM(s) Oral two times a day PRN Moderate Pain (4 - 6)   Chief Complaint: Shortness of breath with exertion, bilateral leg swelling    Interval Hx: Had afib with RVR early this AM for which he received a dose of Lopressor. HR now better controlled, 70 bpm. Patient seen out-of-bed, in arm chair at bedside. He has no dyspnea at rest. No chest pain or tightness. No fever or chills. Still with B/L LE edema. He is not sure whether his exertional dyspnea has improved much since admission because he has not been exerting himself much here. He remains on supplemental oxygen and is receiving COVID19 treatment with remdesivir and dexamethasone. He is planned for cardiac heart cath today.     ROS: Multi system review is comprehensively negative x 10 systems except as above    Vitals:  T(F): 97.3 (02 Mar 2023 07:59), Max: 98 (01 Mar 2023 20:53)  HR: 70 (02 Mar 2023 12:40) (70 - 130)  BP: 115/68 (02 Mar 2023 12:40) (103/63 - 143/72)  RR: 18 (02 Mar 2023 12:40) (18 - 19)  SpO2: 97% (02 Mar 2023 12:40) (93% - 99%) on 2L/min via NC    Exam:  Gen: No acute distress  HEENT: NCAT PERRL EOMI MMM clear oropharynx  Neck: Supple, no LAD, no thyromegaly  Chest: Normal resp effort at rest, diminished breath sounds at bases B/L  CVS: s1 s2 normal, irregular, HR 70  Abd: +BS, soft NT ND   Ext: +B/L LE edema, no tenderness, normal cap refill  Skin: Warm, dry  Mood: calm, pleasant  Neuro: Awake and alert, answers questions appropriately, follows commands, no gross deficits    Labs:                      11.7   6.74 )-----------( 137             37.1       143  |  106  |  71  ----------------------< 104  3.6   |   29   |  1.34    Ca 9.1  Mg 2.4    TPro  6.4  /  Alb  3.1  /  TBili  1.3  /  DBili  0.5  /  AST  34  /  ALT  47  /  AlkPhos  83      PT: 15.0 sec;   INR: 1.29 ratio      Troponin 90, 75   proBNP 11,157   Ddimer 1163    Urinalysis 2/28: Yellow, clear, ket neg, prot 30, N neg, LE small, RBC > 50, WBC 11-25, bact few    Micro:  COVID19 PCR 2/27: Positive   Resp pathogens PCR 2/24: Negative  COVID19 PCR 2/24: Negative    Imaging:  CXR 3/1: Gross heart enlargement again noted. There are slightly increasing right base pleural pulmonary process compared to February 24. Otherwise no change.    CTA chest W/ 2/24: There are no filling defects in the main pulmonary artery or its lobar and segmental branches. Cardiomegaly. There is a small-moderate circumferential pericardial effusion. The great vessels are normal in size. Moderate right and small left pleural effusions. There is mild groundglass opacity in the right upper lobe, likely secondary to pulmonary edema. Mild consolidation in the right lower lobe, likely atelectasis. The central airways are patent. No thoracic adenopathy. Fluid attenuating nodule in the left adrenal gland, possibly an adrenal adenoma. Perihepatic ascites. No aggressive osseous lesions.    CXR 2/24: Moderate enlargement of cardiac silhouette. Left lung is clear. Small right effusion/consolidation.    Cardiac Testing:  Cardiac cath 3/2: Non-obstructive coronary artery disease with heavy calcification. LVEDP 21.    EKG 3/2: Rate 75. SR with PVCs, OH 170ms, QRS 186ms, QT 470ms, QTC 524ms (corrected for RBBB 438ms)    Tele 3/2: Converted to NSR but with paroxysms of afib, RVR this AM, though HR now better controlled, 70s. Still lots of ectopy.     Tele 3/1: Afib, rates 80s-100s, PVCs, 11 sec of WCT likely NSVT    TTE 2/25:  The mitral valve leaflets appear thickened. Moderate (2+) mitral regurgitation present. Mild aortic sclerosis is present with normal valvular opening. Trace aortic regurgitation is present. Moderate to severe (3+) tricuspid valve regurgitation is present. Severe pulmonary hypertension. The left atrium is mildly dilated. Left ventricle systolic function appears impaired; segmental wall motion abnormalities noted. Estimated Ejection Fraction is 35- 40%. Mild concentric left ventricular hypertrophy is present. The IVC is dilated with decreased respiratory variation.    EKG 2/24: Rate 106. Atrial fibrillation with rapid ventricular response with premature ventricular or aberrantly conducted complexes. Right bundle branch block.    Meds:  MEDICATIONS  (STANDING):  aMIOdarone    Tablet 400 milliGRAM(s) Oral two times a day  apixaban 5 milliGRAM(s) Oral every 12 hours  aspirin enteric coated 81 milliGRAM(s) Oral daily  atorvastatin 40 milliGRAM(s) Oral at bedtime  dexAMETHasone     Tablet 6 milliGRAM(s) Oral daily  furosemide    Tablet 40 milliGRAM(s) Oral daily  latanoprost 0.005% Ophthalmic Solution 1 Drop(s) Both EYES at bedtime  melatonin 5 milliGRAM(s) Oral at bedtime  metoprolol tartrate 25 milliGRAM(s) Oral two times a day  multivitamin 1 Tablet(s) Oral daily  remdesivir  IVPB 100 milliGRAM(s) IV Intermittent every 24 hours  tamsulosin 0.8 milliGRAM(s) Oral at bedtime  timolol 0.5% Solution 1 Drop(s) Both EYES two times a day    MEDICATIONS  (PRN):  diclofenac 75 milliGRAM(s) Oral two times a day PRN Moderate Pain (4 - 6)

## 2023-03-02 NOTE — PHYSICAL THERAPY INITIAL EVALUATION ADULT - IMPAIRMENTS CONTRIBUTING TO GAIT DEVIATIONS, PT EVAL
legs appear to fatigue after 35-40ft with mild giving way/flexion of B hips,knees sinking effect, needed cues to stand erect ,stiffen knees

## 2023-03-02 NOTE — PHYSICAL THERAPY INITIAL EVALUATION ADULT - PATIENT PROFILE REVIEW, REHAB EVAL
cardiac cath post-poned until Thursday 3/2/yes cardiac cath post-poned until Thursday 3/2,revealed non-obstructive CAD/yes

## 2023-03-02 NOTE — CHART NOTE - NSCHARTNOTEFT_GEN_A_CORE
notified by nurse for HR at 130s.  Metoprolol tartrate 37.5mg given at 5a.m. HR went down initially, but went back up.   pt asymptomatic, other VSS    Lopressor 5mg ordered, keep monitoring

## 2023-03-02 NOTE — CHART NOTE - NSCHARTNOTEFT_GEN_A_CORE
2/24/23 pt presented to the ER. 88 yo male w/ PMHx of CHF, HTN on lisinopril/HTZ,  BPH  who was sent by his doctor, Dr. Umanzor  to  [facility] [facility]  with complaint of  increasing dyspnea on exertion, dizziness and new onset afib.   Pt reported dyspnea over the last 2 weeks.   He now also has dyspnea  at rest and has noticed increased leg swelling, as noted by his daughters here at bedside in the ED.   Pt has not been on lasix.     3/2/23 Pt is covid +, has been off lovenox since 2/26 (being used as anticoagulation for new onset Afib). Pt seen and examined in room on 3east. Pt currently using nasal cannula. Denies SOB/CP, pt has not seen a cardiologist in a very long time, is unsure about family history.  Noted to have mildly elevated CE with peak trop of 90.5 and new onset CHF.   Pt referred to cardiac cath for further evaluation. 2/24/23 pt presented to the ER. 89 yo male w/ PMHx of CHF, HTN on lisinopril/HTZ,  BPH  who was sent by his doctor, Dr. Umanzor  to  [facility]  with complaint of  increasing dyspnea on exertion, dizziness and new onset afib.   Pt reported dyspnea over the last 2 weeks.   He now also has dyspnea  at rest and has noticed increased leg swelling, as noted by his daughters here at bedside in the ED.   Pt has not been on lasix.     3/2/23 Pt is covid +, has been off lovenox since 2/26 (being used as anticoagulation for new onset Afib). Pt seen and examined in room on 3east. Pt currently using nasal cannula. Denies SOB/CP, pt has not seen a cardiologist in a very long time, is unsure about family history.  Noted to have mildly elevated CE with peak trop of 90.5 and new onset CHF.   Pt referred to cardiac cath for further evaluation.  ASA class: II  Creatinine: 1.35  GFR: 51  Bleeding  Risk score:  3.4%  Colten Score:  12pt no raúl prehydration new onset CHF    Plan:  Left and Right heart cath with possible intervention  -Consent obtained for cardiac catheterization w/ coronary angiogram, possible sedation and/or analgesia and possible stent placement. Pt is competent, has capacity, and understands risks and benefits of procedure. Risks and benefits discussed. Risk discussed included, but not limited to MI, stroke, mortality, major bleeding, arrythmia, or infection. All questions answered

## 2023-03-02 NOTE — PHYSICAL THERAPY INITIAL EVALUATION ADULT - PLANNED THERAPY INTERVENTIONS, PT EVAL
O2 conservation/recovery techniques ,functional endurance/bed mobility training/gait training/transfer training

## 2023-03-02 NOTE — PROGRESS NOTE ADULT - ASSESSMENT
Patient now s/p angiogram that revealed nonobstructive disease.  - medical management  - will start heparin drip in four hours for Afib   - PT/ INR ordered

## 2023-03-02 NOTE — PHYSICAL THERAPY INITIAL EVALUATION ADULT - PERSONAL SAFETY AND JUDGMENT, REHAB EVAL
fall in room 2 days ago , attempting to use urinal stood up unassisted with hands occupied with urinal/at risk behaviors demonstrated

## 2023-03-02 NOTE — PROGRESS NOTE ADULT - ASSESSMENT
88 y/o male with h/o CHF, HTN on lisinopril/HTZ, BPH was admitted on 2/24 for increasing dyspnea on exertion, dizziness and new onset A.fib. Pt reported dyspnea over the last 2 weeks PTA. He has dyspnea at rest and has noticed increased leg swelling, as noted by his daughters here at bedside in the ED. His initial COVID test was negative, but then on 2/27 he was noted COVID PCR detected. He is SOB on supplemental O2 therapy.     1. Acute respiratory failure improving. COVID-19 viral syndrome. Multifocal pneumonia. CHF exacerbation. CRF stage 3.  -respiratory frail, but improving  -cultures noted  -on remdesivir protocol # 2  -tolerating abx well so far; no side effects noted  -O2 therapy  -steroids  -AC  -droplet isolation  -respiratory care  -continue antiviral therapy  -cardiology workup in progress  -monitor temps  -f/u CBC  -supportive care  2. Other issues:   -care per medicine

## 2023-03-02 NOTE — PHYSICAL THERAPY INITIAL EVALUATION ADULT - DIAGNOSIS, PT EVAL
debility/deconditioning debility/deconditioning, acute hypoxic respiratory failure in setting of Covid 19 viral infection, +HFrEF (~30-35%) severe pulmonary HTN (RVSP=60) ,R pleural effusion debility/deconditioning, acute hypoxic respiratory failure in setting of Covid 19 viral infection, new onset PAFib ,new onset +HFrEF (~35-40%) ,non-ischemic CM ,severe pulmonary HTN (RVSP=60) ,R pleural effusion

## 2023-03-02 NOTE — PROGRESS NOTE ADULT - SUBJECTIVE AND OBJECTIVE BOX
CHIEF COMPLAINT: Patient is a 89y old  Male who presents with a chief complaint of SOB    HPI: 89 year old man with a history of hypertension, BPH, and arthritis who presented to the ED after seeing his PCP (Dr Umanzor) earlier today because of shortness of breath.  He hasn't felt well for a few weeks -- predominant complaint was dyspnea that worsened with activity and alleviated with rest; associated with the onset of bilateral leg edema.  He reports no past history of heart disease. In the ED he was diagnosed with new-onset atrial fibrillation and CHF.  He is presently comfortable (at rest); denies orthopnea/weight gain/angina.    3/27/23: no complaints, Tele: Afib , npo p midnight for LHC tomorrow    2/28/23: Patient awake without complaints of cp or sob.  LHC postponed due to elevated creatinine.  Tele: Afib 90's-120, frequent PVC's, couplets, triplets  3/1/23: no complaints; Tele: Afib, PVCs, now with COVID, s/p LHC - non obstructive CAD    MEDICATIONS  (STANDING):  aspirin enteric coated 81 milliGRAM(s) Oral daily  furosemide    Tablet 40 milliGRAM(s) Oral daily  heparin   Injectable 5000 Unit(s) SubCutaneous every 12 hours  latanoprost 0.005% Ophthalmic Solution 1 Drop(s) Both EYES at bedtime  melatonin 5 milliGRAM(s) Oral at bedtime  metoprolol tartrate 25 milliGRAM(s) Oral every 8 hours  multivitamin 1 Tablet(s) Oral daily  tamsulosin 0.8 milliGRAM(s) Oral at bedtime  timolol 0.5% Solution 1 Drop(s) Both EYES two times a day    MEDICATIONS  (PRN):  diclofenac 75 milliGRAM(s) Oral two times a day PRN Moderate Pain (4 - 6)    MEDICATIONS  (STANDING):  apixaban 5 milliGRAM(s) Oral every 12 hours  aspirin enteric coated 81 milliGRAM(s) Oral daily  atorvastatin 40 milliGRAM(s) Oral at bedtime  dexAMETHasone     Tablet 6 milliGRAM(s) Oral daily  furosemide    Tablet 40 milliGRAM(s) Oral daily  latanoprost 0.005% Ophthalmic Solution 1 Drop(s) Both EYES at bedtime  melatonin 5 milliGRAM(s) Oral at bedtime  metoprolol tartrate 37.5 milliGRAM(s) Oral every 8 hours  multivitamin 1 Tablet(s) Oral daily  remdesivir  IVPB 100 milliGRAM(s) IV Intermittent every 24 hours  remdesivir  IVPB   IV Intermittent   tamsulosin 0.8 milliGRAM(s) Oral at bedtime  timolol 0.5% Solution 1 Drop(s) Both EYES two times a day      PHYSICAL EXAM:  Constitutional: NAD, awake and alert, appears stated age  HEENT:   No oral cyanosis.  Pulmonary: Non-labored, breath sounds are clear bilaterally, No wheezing, rales or rhonchi  Cardiovascular: Irregular, normal S1/S2  Gastrointestinal: Bowel Sounds present, soft, nontender.   Lymph: Pitting b/l leg edema.   Neurological: Alert, no focal deficits  Skin: No rashes.  Psych:  Mood & affect appropriate    LABS:                            11.7   6.74  )-----------( 137      ( 02 Mar 2023 06:49 )             37.1     03-02    143  |  106  |  71<H>  ----------------------------<  104<H>  3.6   |  29  |  1.34<H>    Ca    9.1      02 Mar 2023 06:49  Mg     2.4     03-02    TPro  6.4  /  Alb  3.1<L>  /  TBili  1.3<H>  /  DBili  0.5<H>  /  AST  34  /  ALT  47  /  AlkPhos  83  03-02    - TroponinI hsT: <-74.67, <-90.54  03-01    143  |  108  |  69<H>  ----------------------------<  125<H>  4.2   |  28  |  1.48<H>    Ca    9.2      01 Mar 2023 06:27  Mg     2.5     02-28    TPro  6.5  /  Alb  3.4  /  TBili  1.2  /  DBili  x   /  AST  30  /  ALT  42  /  AlkPhos  85  02-28    - TroponinI hsT: <-74.67, <-90.54    02-28    140  |  106  |  68<H>  ----------------------------<  154<H>  3.6   |  26  |  1.76<H>    Ca    9.2      28 Feb 2023 07:32  Mg     2.5     02-28    TPro  6.5  /  Alb  3.4  /  TBili  1.2  /  DBili  x   /  AST  30  /  ALT  42  /  AlkPhos  85  02-28      139    |  109    |  55     ----------------------------<  110    4.2     |  25     |  1.32     Ca    9.3        24 Feb 2023 15:20  Mg     2.5       24 Feb 2023 15:20    TPro  7.0    /  Alb  3.6    /  TBili  1.0    /  DBili  x      /  AST  18     /  ALT  24     /  AlkPhos  82     24 Feb 2023 15:20    PT/INR - ( 24 Feb 2023 15:20 )   PT: 14.2 sec;   INR: 1.22 ratio    PTT - ( 24 Feb 2023 15:20 )  PTT:31.3 sec    Pro Bnp [id number]    ECG: AF, RVR, RBBB      < from: TTE Echo Complete w/o Contrast w/ Doppler (02.25.23 @ 08:52) >   Impression     Summary     The mitral valve leaflets appear thickened.   Moderate (2+) mitral regurgitation ispresent.   Mild aortic sclerosis is present with normal valvular opening.   Trace aortic regurgitation is present.   Moderate to severe (3+) tricuspid valve regurgitation is present.   Severe pulmonary hypertension.   The left atrium is mildly dilated.   Left ventricle systolic function appears impaired; segmental wall motion   abnormalities noted. Estimated Ejection Fraction is 35- 40%.   Mild concentric left ventricular hypertrophy is present.   The IVC is dilated with decreased respiratory variation.     Signature     ----------------------------------------------------------------   Electronically signed by Thad Camejo MD(Interpreting    < end of copied text >    [facility] 3/2/2023     Impression     Diagnostic Conclusions     Non-obstructive coronary artery disease with heavy calcification. LVEDP   21.     Recommendations     Recommend aggressive medical management of non-obstructive CAD as per   ACC/AHA guidelines. Consider EPS involvement given significant ectopy.   REASON FOR VISIT:  CHF, AF    HPI: 89 year old man with a history of hypertension, BPH, and arthritis who presented to the ED after seeing his PCP (Dr Umanzor) earlier today because of shortness of breath.  He hasn't felt well for a few weeks -- predominant complaint was dyspnea that worsened with activity and alleviated with rest; associated with the onset of bilateral leg edema.  He reports no past history of heart disease. In the ED he was diagnosed with new-onset atrial fibrillation and CHF.  He is presently comfortable (at rest); denies orthopnea/weight gain/angina.    3/27/23: no complaints, Tele: Afib , npo p midnight for LHC tomorrow    2/28/23: Patient awake without complaints of cp or sob.  LHC postponed due to elevated creatinine.  Tele: Afib 90's-120, frequent PVC's, couplets, triplets  3/1/23: no complaints; Tele: Afib, PVCs, now with COVID, s/p LHC - non obstructive CAD    MEDICATIONS  (STANDING):  aspirin enteric coated 81 milliGRAM(s) Oral daily  furosemide    Tablet 40 milliGRAM(s) Oral daily  heparin   Injectable 5000 Unit(s) SubCutaneous every 12 hours  latanoprost 0.005% Ophthalmic Solution 1 Drop(s) Both EYES at bedtime  melatonin 5 milliGRAM(s) Oral at bedtime  metoprolol tartrate 25 milliGRAM(s) Oral every 8 hours  multivitamin 1 Tablet(s) Oral daily  tamsulosin 0.8 milliGRAM(s) Oral at bedtime  timolol 0.5% Solution 1 Drop(s) Both EYES two times a day    MEDICATIONS  (PRN):  diclofenac 75 milliGRAM(s) Oral two times a day PRN Moderate Pain (4 - 6)    MEDICATIONS  (STANDING):  apixaban 5 milliGRAM(s) Oral every 12 hours  aspirin enteric coated 81 milliGRAM(s) Oral daily  atorvastatin 40 milliGRAM(s) Oral at bedtime  dexAMETHasone     Tablet 6 milliGRAM(s) Oral daily  furosemide    Tablet 40 milliGRAM(s) Oral daily  latanoprost 0.005% Ophthalmic Solution 1 Drop(s) Both EYES at bedtime  melatonin 5 milliGRAM(s) Oral at bedtime  metoprolol tartrate 37.5 milliGRAM(s) Oral every 8 hours  multivitamin 1 Tablet(s) Oral daily  remdesivir  IVPB 100 milliGRAM(s) IV Intermittent every 24 hours  remdesivir  IVPB   IV Intermittent   tamsulosin 0.8 milliGRAM(s) Oral at bedtime  timolol 0.5% Solution 1 Drop(s) Both EYES two times a day      PHYSICAL EXAM:  Constitutional: NAD, awake and alert, appears stated age  HEENT:   No oral cyanosis.  Pulmonary: Non-labored, breath sounds are clear bilaterally, No wheezing, rales or rhonchi  Cardiovascular: Irregular, normal S1/S2  Gastrointestinal: Bowel Sounds present, soft, nontender.   Lymph: Pitting b/l leg edema.   Neurological: Alert, no focal deficits  Skin: No rashes.  Psych:  Mood & affect appropriate    LABS:                            11.7   6.74  )-----------( 137      ( 02 Mar 2023 06:49 )             37.1     03-02    143  |  106  |  71<H>  ----------------------------<  104<H>  3.6   |  29  |  1.34<H>    Ca    9.1      02 Mar 2023 06:49  Mg     2.4     03-02    TPro  6.4  /  Alb  3.1<L>  /  TBili  1.3<H>  /  DBili  0.5<H>  /  AST  34  /  ALT  47  /  AlkPhos  83  03-02    - TroponinI hsT: <-74.67, <-90.54  03-01    143  |  108  |  69<H>  ----------------------------<  125<H>  4.2   |  28  |  1.48<H>    Ca    9.2      01 Mar 2023 06:27  Mg     2.5     02-28    TPro  6.5  /  Alb  3.4  /  TBili  1.2  /  DBili  x   /  AST  30  /  ALT  42  /  AlkPhos  85  02-28    - TroponinI hsT: <-74.67, <-90.54    02-28    140  |  106  |  68<H>  ----------------------------<  154<H>  3.6   |  26  |  1.76<H>    Ca    9.2      28 Feb 2023 07:32  Mg     2.5     02-28    TPro  6.5  /  Alb  3.4  /  TBili  1.2  /  DBili  x   /  AST  30  /  ALT  42  /  AlkPhos  85  02-28      139    |  109    |  55     ----------------------------<  110    4.2     |  25     |  1.32     Ca    9.3        24 Feb 2023 15:20  Mg     2.5       24 Feb 2023 15:20    TPro  7.0    /  Alb  3.6    /  TBili  1.0    /  DBili  x      /  AST  18     /  ALT  24     /  AlkPhos  82     24 Feb 2023 15:20    PT/INR - ( 24 Feb 2023 15:20 )   PT: 14.2 sec;   INR: 1.22 ratio    PTT - ( 24 Feb 2023 15:20 )  PTT:31.3 sec    Pro Bnp [id number]    ECG: AF, RVR, RBBB      < from: TTE Echo Complete w/o Contrast w/ Doppler (02.25.23 @ 08:52) >   Impression     Summary     The mitral valve leaflets appear thickened.   Moderate (2+) mitral regurgitation ispresent.   Mild aortic sclerosis is present with normal valvular opening.   Trace aortic regurgitation is present.   Moderate to severe (3+) tricuspid valve regurgitation is present.   Severe pulmonary hypertension.   The left atrium is mildly dilated.   Left ventricle systolic function appears impaired; segmental wall motion   abnormalities noted. Estimated Ejection Fraction is 35- 40%.   Mild concentric left ventricular hypertrophy is present.   The IVC is dilated with decreased respiratory variation.     Signature     ----------------------------------------------------------------   Electronically signed by Thad Camejo MD(Interpreting    < end of copied text >    [facility] 3/2/2023     Impression     Diagnostic Conclusions     Non-obstructive coronary artery disease with heavy calcification. LVEDP   21.     Recommendations     Recommend aggressive medical management of non-obstructive CAD as per   ACC/AHA guidelines. Consider EPS involvement given significant ectopy.

## 2023-03-02 NOTE — PHYSICAL THERAPY INITIAL EVALUATION ADULT - LEVEL OF INDEPENDENCE: GAIT, REHAB EVAL
CCGx1,min assist with appropriate navigation of RW/contact guard/minimum assist (75% patients effort)

## 2023-03-02 NOTE — PHYSICAL THERAPY INITIAL EVALUATION ADULT - LEVEL OF INDEPENDENCE: STAND/SIT, REHAB EVAL
remains out of bed to upright recliner with alarm enabled/contact guard/minimum assist (75% patients effort)

## 2023-03-02 NOTE — PROGRESS NOTE ADULT - SUBJECTIVE AND OBJECTIVE BOX
Date of service: 23 @ 12:18    Cardiac cath  SOB is improving  Has dry cough  No fever    ROS: no fever or chills; denies dizziness, no HA, no abdominal pain, no diarrhea or constipation; no dysuria, no legs pain, no rashes    MEDICATIONS  (STANDING):  aspirin enteric coated 81 milliGRAM(s) Oral daily  dexAMETHasone     Tablet 6 milliGRAM(s) Oral daily  furosemide    Tablet 40 milliGRAM(s) Oral daily  heparin   Injectable 7000 Unit(s) IV Push once  heparin  Infusion.  Unit(s)/Hr (16 mL/Hr) IV Continuous <Continuous>  latanoprost 0.005% Ophthalmic Solution 1 Drop(s) Both EYES at bedtime  melatonin 5 milliGRAM(s) Oral at bedtime  metoprolol tartrate 37.5 milliGRAM(s) Oral every 8 hours  multivitamin 1 Tablet(s) Oral daily  remdesivir  IVPB 100 milliGRAM(s) IV Intermittent every 24 hours  remdesivir  IVPB   IV Intermittent   tamsulosin 0.8 milliGRAM(s) Oral at bedtime  timolol 0.5% Solution 1 Drop(s) Both EYES two times a day    Vital Signs Last 24 Hrs  T(C): 36.3 (02 Mar 2023 07:59), Max: 36.7 (01 Mar 2023 20:53)  T(F): 97.3 (02 Mar 2023 07:59), Max: 98 (01 Mar 2023 20:53)  HR: 105 (02 Mar 2023 07:59) (79 - 130)  BP: 103/63 (02 Mar 2023 07:59) (103/63 - 143/72)  BP(mean): --  RR: 19 (02 Mar 2023 07:59) (18 - 19)  SpO2: 94% (02 Mar 2023 07:59) (93% - 94%)    Parameters below as of 02 Mar 2023 07:59  Patient On (Oxygen Delivery Method): nasal cannula  O2 Flow (L/min): 2     Physical exam:    Constitutional:  No acute distress  HEENT: NC/AT, EOMI, PERRLA, conjunctivae clear; ears and nose atraumatic  Neck: supple; thyroid not palpable  Back: no tenderness  Respiratory: respiratory effort normal; crackles at bases  Cardiovascular: S1S2 regular, no murmurs  Abdomen: soft, not tender, not distended, positive BS  Genitourinary: no suprapubic tenderness  Lymphatic: no LN palpable  Musculoskeletal: no muscle tenderness, no joint swelling or tenderness  Extremities: no pedal edema  Neurological/ Psychiatric: AxOx3, moving all extremities  Skin: no rashes; no palpable lesions    Labs: reviewed                        11.7   6.74  )-----------( 137      ( 02 Mar 2023 06:49 )             37.1     -    143  |  106  |  71<H>  ----------------------------<  104<H>  3.6   |  29  |  1.34<H>    Ca    9.1      02 Mar 2023 06:49  Mg     2.4     03-    TPro  6.4  /  Alb  3.1<L>  /  TBili  1.3<H>  /  DBili  0.5<H>  /  AST  34  /  ALT  47  /  AlkPhos  83  03-    D-Dimer Assay, Quantitative: 1163 ng/mL DDU (23 @ 15:20)    03-    143  |  108  |  69<H>  ----------------------------<  125<H>  4.2   |  28  |  1.48<H>    Ca    9.2      01 Mar 2023 06:27  Mg     2.5         TPro  6.5  /  Alb  3.4  /  TBili  1.2  /  DBili  x   /  AST  30  /  ALT  42  /  AlkPhos  85       LIVER FUNCTIONS - ( 2023 07:32 )  Alb: 3.4 g/dL / Pro: 6.5 gm/dL / ALK PHOS: 85 U/L / ALT: 42 U/L / AST: 30 U/L / GGT: x           Urinalysis Basic - ( 2023 18:40 )    Color: Yellow / Appearance: Clear / S.020 / pH: x  Gluc: x / Ketone: Negative  / Bili: Negative / Urobili: Negative   Blood: x / Protein: 30 mg/dL / Nitrite: Negative   Leuk Esterase: Small / RBC: >50 /HPF / WBC 11-25 /HPF   Sq Epi: x / Non Sq Epi: Few / Bacteria: Few    COVID-19 PCR: Detected (23 @ 15:20)    ( @ 15:53)  [facility]    Radiology: all available radiological tests reviewed    < from: CT Angio Chest PE Protocol w/ IV Cont (23 @ 16:57) >  No evidence of central, lobar, or segmental pulmonary embolism.  Cardiomegaly with a small-moderate circumferential pericardial effusion.   Correlation with echocardiogram is suggested.  Moderate right and small left pleural effusions.  < end of copied text >    < from: Xray Chest 1 View-PORTABLE IMMEDIATE (Xray Chest 1 View-PORTABLE IMMEDIATE .) (23 @ 09:29) >  IMPRESSION: Increase in mild right base pleural pulmonary process.  < end of copied text >      Advanced directives addressed: full resuscitation

## 2023-03-02 NOTE — CONSULT NOTE ADULT - NS ATTEND AMEND GEN_ALL_CORE FT
nicmp [id number]%, pAF frequent PVC and qide QRS RBBB like related to cardiomyopathy, agree with amiodarone and monitor for bradycardia, high grade avb, monitor in tele

## 2023-03-02 NOTE — PROGRESS NOTE ADULT - PROBLEM SELECTOR PLAN 4
small - observation
Small; observation.
small - observation
Small-medium on CT; will evaluate on TTE as well.
small - observation
small - observation

## 2023-03-02 NOTE — CONSULT NOTE ADULT - SUBJECTIVE AND OBJECTIVE BOX
HPI:  Pt is a pleasant 88 yo male w/ PMHx of CHF, HTN on lisinopril/HTZ,  BPH  who was sent by his doctor, Dr. Umanzor  to  [place] ED  with complaint of  increasing dyspnea on exertion, dizziness and new onset afib.   Pt reported dyspnea over the last 2 weeks.   He now also has dyspnea  at rest and has noticed increased leg swelling, as noted by his daughters here at bedside in the ED.   Pt has not been on lasix.   Pt denies cp, cough, no abd pain, no n/v/d, no night time orthopnea or PND,  no palpitations, no f/c, no resp or urinary complaints.    (2023 18:04)    3/2: History as above. Diagnosed w covid . Now awake and alert, sitting in chair. Had NEVAREZ prior to admission. CXR?CT scan shows pulmonary congestion w moderate right effussion. ECHO shows LVEF 35-40%. Has 3+ tricuspid ergurg w severe pulmonary HTN. Not on O2 at home.      PAST MEDICAL & SURGICAL HISTORY:  HTN (hypertension)      History of BPH      History of mastoidectomy          MEDICATIONS  (STANDING):  aspirin enteric coated 81 milliGRAM(s) Oral daily  dexAMETHasone     Tablet 6 milliGRAM(s) Oral daily  furosemide    Tablet 40 milliGRAM(s) Oral daily  heparin   Injectable 5000 Unit(s) SubCutaneous every 12 hours  latanoprost 0.005% Ophthalmic Solution 1 Drop(s) Both EYES at bedtime  melatonin 5 milliGRAM(s) Oral at bedtime  metoprolol tartrate 37.5 milliGRAM(s) Oral every 8 hours  multivitamin 1 Tablet(s) Oral daily  potassium chloride    Tablet ER 20 milliEquivalent(s) Oral once  remdesivir  IVPB 100 milliGRAM(s) IV Intermittent every 24 hours  remdesivir  IVPB   IV Intermittent   tamsulosin 0.8 milliGRAM(s) Oral at bedtime  timolol 0.5% Solution 1 Drop(s) Both EYES two times a day    MEDICATIONS  (PRN):  diclofenac 75 milliGRAM(s) Oral two times a day PRN Moderate Pain (4 - 6)      Allergies    No Known Allergies    Intolerances        SOCIAL HISTORY: Denies tobacco, etoh abuse or illicit drug use    FAMILY HISTORY:  FH: type 2 diabetes mellitus (Father)    Family hx of hypertension (Mother)    Family history of hypertension (Father)        Vital Signs Last 24 Hrs  T(C): 36.2 (02 Mar 2023 06:50), Max: 36.7 (01 Mar 2023 20:53)  T(F): 97.2 (02 Mar 2023 06:50), Max: 98 (01 Mar 2023 20:53)  HR: 130 (02 Mar 2023 06:50) (79 - 130)  BP: 117/82 (02 Mar 2023 06:50) (108/82 - 143/72)  BP(mean): --  RR: 18 (01 Mar 2023 20:53) (18 - 18)  SpO2: 93% (01 Mar 2023 20:53) (93% - 93%)    Parameters below as of 01 Mar 2023 20:53  Patient On (Oxygen Delivery Method): nasal cannula  O2 Flow (L/min): 2      REVIEW OF SYSTEMS:    CONSTITUTIONAL:  As per HPI.  SKIN: no rashes  HEENT:  Eyes:  No diplopia or blurred vision. ENT:  No earache, sore throat or runny nose.  CARDIOVASCULAR:  No pressure, squeezing, tightness, heaviness or aching about the chest, neck, axilla or epigastrium.  RESPIRATORY:  sob  GASTROINTESTINAL:  No nausea, vomiting or diarrhea.  GENITOURINARY:  No dysuria, frequency or urgency.  MUSCULOSKELETAL:  As per HPI.  SKIN:  No change in skin, hair or nails.  NEUROLOGIC:  No paresthesias, fasciculations, seizures or weakness.  PSYCHIATRIC:  No disorder of thought or mood.  ENDOCRINE:  No heat or cold intolerance, polyuria or polydipsia.  HEMATOLOGICAL:  No easy bruising or bleedings:  .     PHYSICAL EXAMINATION:    GENERAL APPEARANCE:  Pt. is not currently dyspneic, in no distress. Pt. is alert, oriented, and pleasant.  HEENT:  Pupils are normal and react normally. No icterus. Mucous membranes well colored.  NECK:  Supple. No lymphadenopathy. Jugular venous pressure not elevated. Carotids equal.   HEART:   S1S2 irreg  CHEST:  decreased BS  ABDOMEN:  Soft and nontender.   EXTREMITIES:  + edema   SKIN:  No rash or significant lesions are noted.    LABS:                        11.7   6.74  )-----------( 137      ( 02 Mar 2023 06:49 )             37.1     03-02    143  |  106  |  71<H>  ----------------------------<  104<H>  3.6   |  29  |  1.34<H>    Ca    9.1      02 Mar 2023 06:49  Mg     2.4     03-    TPro  6.4  /  Alb  3.1<L>  /  TBili  1.3<H>  /  DBili  0.5<H>  /  AST  34  /  ALT  47  /  AlkPhos  83  03-    LIVER FUNCTIONS - ( 02 Mar 2023 06:49 )  Alb: 3.1 g/dL / Pro: 6.4 gm/dL / ALK PHOS: 83 U/L / ALT: 47 U/L / AST: 34 U/L / GGT: x                 Urinalysis Basic - ( 2023 18:40 )    Color: Yellow / Appearance: Clear / S.020 / pH: x  Gluc: x / Ketone: Negative  / Bili: Negative / Urobili: Negative   Blood: x / Protein: 30 mg/dL / Nitrite: Negative   Leuk Esterase: Small / RBC: >50 /HPF / WBC 11-25 /HPF   Sq Epi: x / Non Sq Epi: Few / Bacteria: Few          RADIOLOGY & ADDITIONAL STUDIES:

## 2023-03-02 NOTE — PROGRESS NOTE ADULT - SUBJECTIVE AND OBJECTIVE BOX
Cardiology NP     Patient is a 89y old  Male who presents with a chief complaint of New onset atrial fibrillation  New onset CHF  Troponin elevation (02 Mar 2023 08:43)      HPI:  Pt is a pleasant 89 yo male w/ PMHx of CHF, HTN on lisinopril/HTZ,  BPH  who was sent by his doctor, Dr. Umanzor  to  [place] [facility]  with complaint of  increasing dyspnea on exertion, dizziness and new onset afib.   Pt reported dyspnea over the last 2 weeks.   He now also has dyspnea  at rest and has noticed increased leg swelling, as noted by his daughters here at bedside in the ED.   Pt has not been on lasix.     Pt denies cp, cough, no abd pain, no n/v/d, no night time orthopnea or PND,  no palpitations, no f/c, no resp or urinary complaints.       (24 Feb 2023 18:04)      PAST MEDICAL & SURGICAL HISTORY:  HTN (hypertension)    History of BPH    History of mastoidectomy        MEDICATIONS  (STANDING):  aspirin enteric coated 81 milliGRAM(s) Oral daily  dexAMETHasone     Tablet 6 milliGRAM(s) Oral daily  furosemide    Tablet 40 milliGRAM(s) Oral daily  latanoprost 0.005% Ophthalmic Solution 1 Drop(s) Both EYES at bedtime  melatonin 5 milliGRAM(s) Oral at bedtime  metoprolol tartrate 37.5 milliGRAM(s) Oral every 8 hours  multivitamin 1 Tablet(s) Oral daily  remdesivir  IVPB 100 milliGRAM(s) IV Intermittent every 24 hours  remdesivir  IVPB   IV Intermittent   tamsulosin 0.8 milliGRAM(s) Oral at bedtime  timolol 0.5% Solution 1 Drop(s) Both EYES two times a day    MEDICATIONS  (PRN):  diclofenac 75 milliGRAM(s) Oral two times a day PRN Moderate Pain (4 - 6)      Allergies    No Known Allergies    REVIEW OF SYSTEMS: As mentioned in HPI all others Negative     Vital Signs Last 24 Hrs  T(C): 36.3 (02 Mar 2023 07:59), Max: 36.7 (01 Mar 2023 20:53)  T(F): 97.3 (02 Mar 2023 07:59), Max: 98 (01 Mar 2023 20:53)  HR: 105 (02 Mar 2023 07:59) (79 - 130)  BP: 103/63 (02 Mar 2023 07:59) (103/63 - 143/72)  BP(mean): --  RR: 19 (02 Mar 2023 07:59) (18 - 19)  SpO2: 94% (02 Mar 2023 07:59) (93% - 94%)    Parameters below as of 02 Mar 2023 07:59  Patient On (Oxygen Delivery Method): nasal cannula  O2 Flow (L/min): 2      PHYSICAL EXAM:  NERVOUS SYSTEM:  Alert & Oriented X3, Good concentration  CHEST/LUNG: Clear to auscultation bilaterally;  HEART: Irregular rate and rhythm; No murmurs, rubs, or gallops  ABDOMEN: Soft, Nontender, Nondistended; Bowel sounds present  EXTREMITIES:  2+ Peripheral Pulses, No clubbing, cyanosis, or edema  SKIN: right radial cath site without bleeding or hematoma     LABS:                        11.7   6.74  )-----------( 137      ( 02 Mar 2023 06:49 )             37.1     03-02    143  |  106  |  71<H>  ----------------------------<  104<H>  3.6   |  29  |  1.34<H>    Ca    9.1      02 Mar 2023 06:49  Mg     2.4     03-02    TPro  6.4  /  Alb  3.1<L>  /  TBili  1.3<H>  /  DBili  0.5<H>  /  AST  34  /  ALT  47  /  AlkPhos  83  03-02

## 2023-03-02 NOTE — PROGRESS NOTE ADULT - PROBLEM SELECTOR PLAN 2
Moderate LV dysfunction 35-40% with segmental wall motion abnormalities on echo raising suspicion for underlying CAD; elevated [id number]  GDMT limited by CKD - cont. BB, no ACEI/ARB/ARNI due to CKD  cont. diuresis with lasix 40 mg PO daily, daily weight, strict I&Os, fluid restriction 2L/day  s/p LHC - non obstructive CAD

## 2023-03-02 NOTE — PHYSICAL THERAPY INITIAL EVALUATION ADULT - LIVES WITH, PROFILE
resides with wife who has dementia, pt states he is primary caregiver for wife , daughters currently caring for spouse/spouse

## 2023-03-03 LAB
ALBUMIN SERPL ELPH-MCNC: 3 G/DL — LOW (ref 3.3–5)
ALP SERPL-CCNC: 81 U/L — SIGNIFICANT CHANGE UP (ref 40–120)
ALT FLD-CCNC: 46 U/L — SIGNIFICANT CHANGE UP (ref 12–78)
ANION GAP SERPL CALC-SCNC: 4 MMOL/L — LOW (ref 5–17)
AST SERPL-CCNC: 28 U/L — SIGNIFICANT CHANGE UP (ref 15–37)
BILIRUB DIRECT SERPL-MCNC: 0.4 MG/DL — HIGH (ref 0–0.3)
BILIRUB INDIRECT FLD-MCNC: 0.5 MG/DL — SIGNIFICANT CHANGE UP (ref 0.2–1)
BILIRUB SERPL-MCNC: 0.9 MG/DL — SIGNIFICANT CHANGE UP (ref 0.2–1.2)
BUN SERPL-MCNC: 71 MG/DL — HIGH (ref 7–23)
CALCIUM SERPL-MCNC: 8.6 MG/DL — SIGNIFICANT CHANGE UP (ref 8.5–10.1)
CHLORIDE SERPL-SCNC: 105 MMOL/L — SIGNIFICANT CHANGE UP (ref 96–108)
CO2 SERPL-SCNC: 29 MMOL/L — SIGNIFICANT CHANGE UP (ref 22–31)
CREAT SERPL-MCNC: 1.34 MG/DL — HIGH (ref 0.5–1.3)
EGFR: 51 ML/MIN/1.73M2 — LOW
GLUCOSE SERPL-MCNC: 120 MG/DL — HIGH (ref 70–99)
MAGNESIUM SERPL-MCNC: 2.2 MG/DL — SIGNIFICANT CHANGE UP (ref 1.6–2.6)
POTASSIUM SERPL-MCNC: 3.5 MMOL/L — SIGNIFICANT CHANGE UP (ref 3.5–5.3)
POTASSIUM SERPL-SCNC: 3.5 MMOL/L — SIGNIFICANT CHANGE UP (ref 3.5–5.3)
PROT SERPL-MCNC: 6.2 GM/DL — SIGNIFICANT CHANGE UP (ref 6–8.3)
SODIUM SERPL-SCNC: 138 MMOL/L — SIGNIFICANT CHANGE UP (ref 135–145)
TSH SERPL-MCNC: 0.81 UU/ML — SIGNIFICANT CHANGE UP (ref 0.34–4.82)

## 2023-03-03 PROCEDURE — 99232 SBSQ HOSP IP/OBS MODERATE 35: CPT

## 2023-03-03 PROCEDURE — 99233 SBSQ HOSP IP/OBS HIGH 50: CPT

## 2023-03-03 PROCEDURE — 93010 ELECTROCARDIOGRAM REPORT: CPT

## 2023-03-03 RX ORDER — POTASSIUM CHLORIDE 20 MEQ
40 PACKET (EA) ORAL ONCE
Refills: 0 | Status: COMPLETED | OUTPATIENT
Start: 2023-03-03 | End: 2023-03-03

## 2023-03-03 RX ORDER — POTASSIUM CHLORIDE 20 MEQ
20 PACKET (EA) ORAL DAILY
Refills: 0 | Status: DISCONTINUED | OUTPATIENT
Start: 2023-03-04 | End: 2023-03-07

## 2023-03-03 RX ADMIN — APIXABAN 5 MILLIGRAM(S): 2.5 TABLET, FILM COATED ORAL at 21:53

## 2023-03-03 RX ADMIN — AMIODARONE HYDROCHLORIDE 400 MILLIGRAM(S): 400 TABLET ORAL at 21:55

## 2023-03-03 RX ADMIN — AMIODARONE HYDROCHLORIDE 400 MILLIGRAM(S): 400 TABLET ORAL at 09:39

## 2023-03-03 RX ADMIN — Medication 5 MILLIGRAM(S): at 21:54

## 2023-03-03 RX ADMIN — Medication 1 TABLET(S): at 09:39

## 2023-03-03 RX ADMIN — Medication 40 MILLIGRAM(S): at 09:40

## 2023-03-03 RX ADMIN — Medication 81 MILLIGRAM(S): at 09:39

## 2023-03-03 RX ADMIN — TAMSULOSIN HYDROCHLORIDE 0.8 MILLIGRAM(S): 0.4 CAPSULE ORAL at 21:52

## 2023-03-03 RX ADMIN — Medication 40 MILLIEQUIVALENT(S): at 13:54

## 2023-03-03 RX ADMIN — LATANOPROST 1 DROP(S): 0.05 SOLUTION/ DROPS OPHTHALMIC; TOPICAL at 21:51

## 2023-03-03 RX ADMIN — Medication 6 MILLIGRAM(S): at 09:40

## 2023-03-03 RX ADMIN — APIXABAN 5 MILLIGRAM(S): 2.5 TABLET, FILM COATED ORAL at 09:40

## 2023-03-03 RX ADMIN — Medication 1 DROP(S): at 06:37

## 2023-03-03 RX ADMIN — ATORVASTATIN CALCIUM 40 MILLIGRAM(S): 80 TABLET, FILM COATED ORAL at 21:55

## 2023-03-03 RX ADMIN — Medication 1 DROP(S): at 17:34

## 2023-03-03 RX ADMIN — Medication 25 MILLIGRAM(S): at 21:53

## 2023-03-03 RX ADMIN — REMDESIVIR 200 MILLIGRAM(S): 5 INJECTION INTRAVENOUS at 17:33

## 2023-03-03 NOTE — PROGRESS NOTE ADULT - SUBJECTIVE AND OBJECTIVE BOX
Chief Complaint: Shortness of breath with exertion, bilateral leg swelling    Interval Hx: Patient seen and examined. He is subjectively improved since admission. No longer dyspneic. Still has B/L LE edema. No other acute complaints. Oxygenation has significantly improved. Weaned from 4L/min O2 via NC to now room air, SPO2 98%, receiving COVID19 treatment with remdesivir and dexamethasone. Cardiac cath yesterday with non occlusive CAD, elevated LVEDP. On Lasix. He is planned for cardiac heart cath today. Remains in afib with PVCs, prolonged QTc. On amiodarone load.     ROS: Multi system review is comprehensively negative x 10 systems except as above    Vitals:  T(F): 97 (03 Mar 2023 07:55), Max: 97.5 (02 Mar 2023 19:59)  HR: 69 (03 Mar 2023 09:35) (62 - 70)  BP: 100/54 (03 Mar 2023 09:35) (100/54 - 111/63)  RR: 17 (03 Mar 2023 09:35) (17 - 19)  SpO2: 97% (03 Mar 2023 09:35) (96% - 100%) on room air    Exam:  Gen: No acute distress  HEENT: NCAT PERRL EOMI MMM clear oropharynx  Neck: Supple, no LAD, no thyromegaly  Chest: Normal resp effort at rest, diminished breath sounds at bases B/L  CVS: s1 s2 normal, irregular, HR 70  Abd: +BS, soft NT ND   Ext: +B/L LE edema, no tenderness, normal cap refill  Skin: Warm, dry  Mood: calm, pleasant  Neuro: Awake and alert, answers questions appropriately, follows commands, no gross deficits    Labs:                      11.7   6.74 )-----------( 137             37.1       138  |  105  |  71  -----------------------< 120  3.5   |   29   |  1.34    Ca 8.6   Mg 2.2    TPro  6.2  /  Alb  3.0  /  TBili  0.9  /  DBili  0.4  /  AST  28  /  ALT  46  /  AlkPhos  81      PT: 15.0 sec;   INR: 1.29 ratio      Troponin 90, 75   proBNP 11,157   Ddimer 1163    Urinalysis 2/28: Yellow, clear, ket neg, prot 30, N neg, LE small, RBC > 50, WBC 11-25, bact few    Micro:  COVID19 PCR 2/27: Positive   Resp pathogens PCR 2/24: Negative  COVID19 PCR 2/24: Negative    Imaging:  CXR 3/1: Gross heart enlargement again noted. There are slightly increasing right base pleural pulmonary process compared to February 24. Otherwise no change.    CTA chest W/ 2/24: There are no filling defects in the main pulmonary artery or its lobar and segmental branches. Cardiomegaly. There is a small-moderate circumferential pericardial effusion. The great vessels are normal in size. Moderate right and small left pleural effusions. There is mild groundglass opacity in the right upper lobe, likely secondary to pulmonary edema. Mild consolidation in the right lower lobe, likely atelectasis. The central airways are patent. No thoracic adenopathy. Fluid attenuating nodule in the left adrenal gland, possibly an adrenal adenoma. Perihepatic ascites. No aggressive osseous lesions.    CXR 2/24: Moderate enlargement of cardiac silhouette. Left lung is clear. Small right effusion/consolidation.    Cardiac Testing:  Tele 3/3: SR 60-80's PVC, Ventricular bigeminy    Cardiac Cath 3/2: Non-obstructive coronary artery disease with heavy calcification. LVEDP 21.    EKG 3/2: Rate 75. SR with PVCs, TN 170ms, QRS 186ms, QT 470ms, QTC 524ms (corrected for RBBB 438ms)    Tele 3/2: Converted to NSR but with paroxysms of afib, RVR this AM, though HR now better controlled, 70s. Still lots of ectopy.     Tele 3/1: Afib, rates 80s-100s, PVCs, 11 sec of WCT likely NSVT    TTE 2/25:  The mitral valve leaflets appear thickened. Moderate (2+) mitral regurgitation present. Mild aortic sclerosis is present with normal valvular opening. Trace aortic regurgitation is present. Moderate to severe (3+) tricuspid valve regurgitation is present. Severe pulmonary hypertension. The left atrium is mildly dilated. Left ventricle systolic function appears impaired; segmental wall motion abnormalities noted. Estimated Ejection Fraction is 35- 40%. Mild concentric left ventricular hypertrophy is present. The IVC is dilated with decreased respiratory variation.    EKG 2/24: Rate 106. Atrial fibrillation with rapid ventricular response with premature ventricular or aberrantly conducted complexes. Right bundle branch block.    Meds:  MEDICATIONS  (STANDING):  aMIOdarone    Tablet 400 milliGRAM(s) Oral two times a day  apixaban 5 milliGRAM(s) Oral every 12 hours  aspirin enteric coated 81 milliGRAM(s) Oral daily  atorvastatin 40 milliGRAM(s) Oral at bedtime  dexAMETHasone     Tablet 6 milliGRAM(s) Oral daily  furosemide    Tablet 40 milliGRAM(s) Oral daily  latanoprost 0.005% Ophthalmic Solution 1 Drop(s) Both EYES at bedtime  melatonin 5 milliGRAM(s) Oral at bedtime  metoprolol tartrate 25 milliGRAM(s) Oral two times a day  multivitamin 1 Tablet(s) Oral daily  remdesivir  IVPB 100 milliGRAM(s) IV Intermittent every 24 hours  tamsulosin 0.8 milliGRAM(s) Oral at bedtime  timolol 0.5% Solution 1 Drop(s) Both EYES two times a day    MEDICATIONS  (PRN):  diclofenac 75 milliGRAM(s) Oral two times a day PRN Moderate Pain (4 - 6)

## 2023-03-03 NOTE — PROGRESS NOTE ADULT - SUBJECTIVE AND OBJECTIVE BOX
Date of service: 23 @ 12:33    Lying in bed in NAD  SOB is improving  Has dry cough    ROS: no fever or chills; denies dizziness, no HA, no abdominal pain, no diarrhea or constipation; no dysuria, no legs pain, no rashes    MEDICATIONS  (STANDING):  aMIOdarone    Tablet 400 milliGRAM(s) Oral two times a day  apixaban 5 milliGRAM(s) Oral every 12 hours  aspirin enteric coated 81 milliGRAM(s) Oral daily  atorvastatin 40 milliGRAM(s) Oral at bedtime  dexAMETHasone     Tablet 6 milliGRAM(s) Oral daily  furosemide    Tablet 40 milliGRAM(s) Oral daily  latanoprost 0.005% Ophthalmic Solution 1 Drop(s) Both EYES at bedtime  melatonin 5 milliGRAM(s) Oral at bedtime  metoprolol tartrate 25 milliGRAM(s) Oral two times a day  multivitamin 1 Tablet(s) Oral daily  potassium chloride    Tablet ER 40 milliEquivalent(s) Oral once  remdesivir  IVPB   IV Intermittent   remdesivir  IVPB 100 milliGRAM(s) IV Intermittent every 24 hours  tamsulosin 0.8 milliGRAM(s) Oral at bedtime  timolol 0.5% Solution 1 Drop(s) Both EYES two times a day    Vital Signs Last 24 Hrs  T(C): 36.1 (03 Mar 2023 07:55), Max: 36.4 (02 Mar 2023 19:59)  T(F): 97 (03 Mar 2023 07:55), Max: 97.5 (02 Mar 2023 19:59)  HR: 69 (03 Mar 2023 09:35) (62 - 80)  BP: 100/54 (03 Mar 2023 09:35) (100/54 - 145/78)  BP(mean): --  RR: 17 (03 Mar 2023 09:35) (17 - 22)  SpO2: 97% (03 Mar 2023 09:35) (96% - 100%)    Parameters below as of 03 Mar 2023 09:35  Patient On (Oxygen Delivery Method): room air     Physical exam:    Constitutional:  No acute distress  HEENT: NC/AT, EOMI, PERRLA, conjunctivae clear; ears and nose atraumatic  Neck: supple; thyroid not palpable  Back: no tenderness  Respiratory: respiratory effort normal; few crackles at bases  Cardiovascular: S1S2 regular, no murmurs  Abdomen: soft, not tender, not distended, positive BS  Genitourinary: no suprapubic tenderness  Lymphatic: no LN palpable  Musculoskeletal: no muscle tenderness, no joint swelling or tenderness  Extremities: no pedal edema  Neurological/ Psychiatric: AxOx3, moving all extremities  Skin: no rashes; no palpable lesions    Labs: reviewed                        11.7   6.74  )-----------( 137      ( 02 Mar 2023 06:49 )             37.1         138  |  105  |  71<H>  ----------------------------<  120<H>  3.5   |  29  |  1.34<H>    Ca    8.6      03 Mar 2023 07:39  Mg     2.2     03-    TPro  6.2  /  Alb  3.0<L>  /  TBili  0.9  /  DBili  0.4<H>  /  AST  28  /  ALT  46  /  AlkPhos  81  -    D-Dimer Assay, Quantitative: 1163 ng/mL DDU (23 @ 15:20)                  143  |  108  |  69<H>  ----------------------------<  125<H>  4.2   |  28  |  1.48<H>    Ca    9.2      01 Mar 2023 06:27  Mg     2.5         TPro  6.5  /  Alb  3.4  /  TBili  1.2  /  DBili  x   /  AST  30  /  ALT  42  /  AlkPhos  85       LIVER FUNCTIONS - ( 2023 07:32 )  Alb: 3.4 g/dL / Pro: 6.5 gm/dL / ALK PHOS: 85 U/L / ALT: 42 U/L / AST: 30 U/L / GGT: x           Urinalysis Basic - ( 2023 18:40 )    Color: Yellow / Appearance: Clear / S.020 / pH: x  Gluc: x / Ketone: Negative  / Bili: Negative / Urobili: Negative   Blood: x / Protein: 30 mg/dL / Nitrite: Negative   Leuk Esterase: Small / RBC: >50 /HPF / WBC 11-25 /HPF   Sq Epi: x / Non Sq Epi: Few / Bacteria: Few    COVID-19 PCR: Detected (23 @ 15:20)    ( @ 15:53)  [facility]    Radiology: all available radiological tests reviewed    < from: CT Angio Chest PE Protocol w/ IV Cont (23 @ 16:57) >  No evidence of central, lobar, or segmental pulmonary embolism.  Cardiomegaly with a small-moderate circumferential pericardial effusion.   Correlation with echocardiogram is suggested.  Moderate right and small left pleural effusions.  < end of copied text >    < from: Xray Chest 1 View-PORTABLE IMMEDIATE (Xray Chest 1 View-PORTABLE IMMEDIATE .) (23 @ 09:29) >  IMPRESSION: Increase in mild right base pleural pulmonary process.  < end of copied text >      Advanced directives addressed: full resuscitation

## 2023-03-03 NOTE — PROGRESS NOTE ADULT - PROBLEM SELECTOR PLAN 2
Moderate LV dysfunction 35-40% with segmental wall motion abnormalities on echo raising suspicion for underlying CAD; elevated [id number]  GDMT limited by CKD - cont. BB, no ACEI/ARB/ARNI due to CKD  cont. diuresis with lasix 40 mg PO daily, daily weight, strict I&Os, fluid restriction 2L/day  s/p LHC - non obstructive CAD

## 2023-03-03 NOTE — PROGRESS NOTE ADULT - ASSESSMENT
- cont O2  - patient has severe pulmonary HTN (RVSP 60) in setting of HFrEF and mitral regurg  - has right pleural effussion  - pulmonary HTN most likely due to left heart failure  - s/p cardiac cath - LVEF 30-35%  - optimize heart failure treatment  - may need thoracentesis  - on remdesivir/decadron for covid

## 2023-03-03 NOTE — PROGRESS NOTE ADULT - ASSESSMENT
88 y/o male with h/o CHF, HTN on lisinopril/HTZ, BPH was admitted on 2/24 for increasing dyspnea on exertion, dizziness and new onset A.fib. Pt reported dyspnea over the last 2 weeks PTA. He has dyspnea at rest and has noticed increased leg swelling, as noted by his daughters here at bedside in the ED. His initial COVID test was negative, but then on 2/27 he was noted COVID PCR detected. He is SOB on supplemental O2 therapy.     1. Acute respiratory failure improving. COVID-19 viral syndrome. Multifocal pneumonia. CHF exacerbation. CRF stage 3.  -respiratory frail, but improving  -cultures noted  -on remdesivir protocol # 3  -tolerating abx well so far; no side effects noted  -O2 therapy  -steroids  -AC  -droplet isolation  -respiratory care  -continue antiviral therapy  -cardiology workup in progress  -monitor temps  -f/u CBC  -supportive care  2. Other issues:   -care per medicine

## 2023-03-03 NOTE — PROGRESS NOTE ADULT - SUBJECTIVE AND OBJECTIVE BOX
HPI: 88 yo male w/ PMHx of CHF, HTN on lisinopril/HTZ,  BPH  who was sent by his doctor, Dr. Umanzor  to  [place] ED  with complaint of  increasing dyspnea on exertion, dizziness and new onset afib.   Pt reported dyspnea over the last 2 weeks.   He now also has dyspnea  at rest and has noticed increased leg swelling, as noted by his daughters here at bedside in the ED.   Pt has not been on lasix.     Pt denies cp, cough, no abd pain, no n/v/d, no night time orthopnea or PND,  no palpitations, no f/c, no resp or urinary complaints.    Pt found to have AF w/RVR/CHF, echo showed LVEF 35-40%, also diagnosed (+)COVID on remdesivir& steroids.  s/p LHC today- non obstructive CAD    Echo (2/25/23) LVEF 35-40%, 2+MR, 3+ TR, sev pul HTN  tele:  episode of Aflutter 140's bpm for 1 hr at 6AM, then remains in SR 60-70's bpm, frequent PVCs, couplets  EKG (2/24/23) Afib 106 bpm with PVC, QRS 172ms, RBBB  EKG (3/2/23)  SR 75bpm with PVC, CA 170ms, QRS 186ms, QT 470ms, QTC 524ms (corrected for RBBB 438ms)     3/3/23: Pt is OOB c/o ' feeling tired', denies chest pain/SOB/palpitations  tele: SR 60-80's PVC, Ventricular bigeminy    ROS: All other ROS is negative unless indicated above.    Physical Exam:  Vital Signs Last 24 Hrs  T(C): 36.1 (03 Mar 2023 07:55), Max: 36.4 (02 Mar 2023 19:59)  T(F): 97 (03 Mar 2023 07:55), Max: 97.5 (02 Mar 2023 19:59)  HR: 69 (03 Mar 2023 09:35) (62 - 80)  BP: 100/54 (03 Mar 2023 09:35) (100/54 - 145/78)  RR: 17 (03 Mar 2023 09:35) (17 - 22)  SpO2: 97% (03 Mar 2023 09:35) (96% - 100%)    Parameters below as of 03 Mar 2023 09:35  Patient On (Oxygen Delivery Method): room air      Constitutional: well developed,  no deformities and no acute distress    Neurological: Alert & Oriented x 3, CARRILLO, no focal deficits    HEENT: NC/AT, PERRLA, EOMI,  Neck supple.    Respiratory: diminished in bases    Cardiovascular: (+) S1 & S2, (+)CRISTAL    Gastrointestinal: soft, NT, nondistended, (+) BS    Genitourinary: non distended bladder, voiding freely    Extremities: (+)B/L leg edema      Allergies    No Known Allergies    Intolerances      MEDICATIONS  (STANDING):  aMIOdarone    Tablet 400 milliGRAM(s) Oral two times a day  apixaban 5 milliGRAM(s) Oral every 12 hours  aspirin enteric coated 81 milliGRAM(s) Oral daily  atorvastatin 40 milliGRAM(s) Oral at bedtime  dexAMETHasone     Tablet 6 milliGRAM(s) Oral daily  furosemide    Tablet 40 milliGRAM(s) Oral daily  latanoprost 0.005% Ophthalmic Solution 1 Drop(s) Both EYES at bedtime  melatonin 5 milliGRAM(s) Oral at bedtime  metoprolol tartrate 25 milliGRAM(s) Oral two times a day  multivitamin 1 Tablet(s) Oral daily  remdesivir  IVPB   IV Intermittent   remdesivir  IVPB 100 milliGRAM(s) IV Intermittent every 24 hours  tamsulosin 0.8 milliGRAM(s) Oral at bedtime  timolol 0.5% Solution 1 Drop(s) Both EYES two times a day    MEDICATIONS  (PRN):  diclofenac 75 milliGRAM(s) Oral two times a day PRN Moderate Pain (4 - 6)    LABS:                        11.7   6.74  )-----------( 137      ( 02 Mar 2023 06:49 )             37.1     03-03    138  |  105  |  71<H>  ----------------------------<  120<H>  3.5   |  29  |  1.34<H>    Ca    8.6      03 Mar 2023 07:39  Mg     2.2     03-03    TPro  6.2  /  Alb  3.0<L>  /  TBili  0.9  /  DBili  0.4<H>  /  AST  28  /  ALT  46  /  AlkPhos  81  03-03    PT/INR - ( 02 Mar 2023 06:50 )   PT: 15.0 sec;   INR: 1.29 ratio   TSH:0.81

## 2023-03-03 NOTE — PROGRESS NOTE ADULT - ASSESSMENT
89 year old man with HTN, BPH, arthritis, presented 2/24 with exertional dyspnea and B/L LE edema, found to have new onset afib with RVR and CHF. Admitted to Medicine.     Acute respiratory failure with hypoxia  Likely multifactorial due to decompensated CHF, pulmonary edema, B/L pleural effusions, atelectasis, pericardial effusion, and now diagnosis of COVID19. Improved with treatment of these issues. Weaned to room air today.   - Continue to manage underlying issues, see below    COVID19 infection, unable to rule out COVID19 pneumonia  Appreciate input from ID. Started patient on remdesivir and dexamethasone 3/1.   - Continue remdesivir, (3/1-), plan for 5-day course  - Continue dexamethasone (3/1-), plan for 10-day course  - Incentive spirometry  - Encourage mobility  - DVT px    New onset afib with RVR, PVCs, prolonged QTC  Converted to NSR 3/1 but with paroxysms of atrial fibrillation and periods of rapid response, significant PVC burden, prolonged QTC and infrequent wide complex tachycardia, likely NSVT. Appreciate input from Cardiology and Cardiac EP.   - Continue metoprolol. Amiodarone 400mg po BID for 2 weeks then plan to decrease to 400mg daily. Black box warning of Amiodarone discussed with patient. Will need out patient monitoring of LFT's, TSH, Opthalmology evaluations and PFTs.  - Continue long-term oral anticoagulation, on Eliquis  - ILR implantation, possibly on Monday as a long-term telemetry for AFib / PVC burden   - Maintain K ~4, Mg ~2  - TSH WNL  - Plan to repeat echo in 3 months  - Outpatient follow up with Dr. Carranza    Acute systolic CHF  BNP 11,157. LVEF 35-40% on TTE. Also noted to have Mod MR, mod to severe TR, pHTN. Appreciate input from Cardiology. L heart cath 3/2. Non-obstructive coronary artery disease with heavy calcification. LVEDP 21. Continued on diuretics, metoprolol. Unable to use ACEI/ARB/ARNI at the present due to CrCl  - Continue furosemide, metoprolol  - Strict Is and Os, daily wt, fluid restriction    Pulmonary HTN  As noted on TTE. Cardiac cath done, elevated LVED noted.   - Continue management of CHF    HTN  BP improved.  - Continue to monitor    Pericardial effusion  Rather small in side. No sign of tamponade.   - Continue to monitor    Abnormal urinalysis  UA with negative nitrite, small LE, >50 RBC, 11-25 WBCs, few bact. No dysuria or gross hematuria. No suprapubic pain for flank pain. Urine culture negative.   - Monitoring off antibiotics.     BPH  Stable  - Continue tamsulosin    Physical deconditioning and debility  PT consulted, recommended ROBBIE vs home with home PT.    - Continue restorative PT sessions        DVT px: On Eliquis for afib  Code status: Full code  Dispo: Anticipate patient to remain inpatient into Monday 3/6 for monitoring amiodarone load in setting of heavy PVC burden, prolonged QTc, wide QRS, also continued diuresis and supportive care measures

## 2023-03-03 NOTE — PROGRESS NOTE ADULT - NS ATTEND AMEND GEN_ALL_CORE FT
nicmp lvef 35-40%, frequent pvc and episodes of paf, tolerating amiodarone, to continue for now, conduction disease, monitor on tele and ilr to monitor for arrhythmia recurrence, pvc count

## 2023-03-03 NOTE — PROGRESS NOTE ADULT - PROBLEM SELECTOR PLAN 1
Rate suboptimally controlled (tachycardic with exertion; occasional pauses) and in the setting of moderate LV systolic dysfunction; converted to sinus rhythm on 3/1/23.  EP input appreciated.  Metoprolol decreased to 25mg BID.  Started on amiodarone 400mg BID for 2 weeks then decrease to 400mg daily.  Consider ILR placement Monday for long term monitoring of afib and PVC burden if patient and family in agreement     s/p LHC  on 3/2 revealing non obstructive CAD - results as above - continue eliquis 5 mg po q12hrs, cont, BB, no ACEI/ARB due to labile kidney function, continue lipitor 40 mg po HS,

## 2023-03-03 NOTE — PROGRESS NOTE ADULT - ASSESSMENT
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  Also (+)COVID.  Pt converted to SR on 3/1 with paroxysmal episode of PAFlutter, frequent mutifocal PVCs on tele.  Pt had dizziness prior to admission but denies syncope  - continue metoprolol to 25mg po BID  - amiodarone 400mg po BID for 2 weeks (started on 3/2)  then decrease to 400mg daily.   Black box warning of Amiodarone discussed with patient. Will need out patient monitoring of LFT's, TSH, Opthalmology evaluations and Pulmonary Function Tests.  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  Also (+)COVID.  Pt converted to SR on 3/1 with paroxysmal episode of PAFlutter, frequent mutifocal PVCs on tele.  Pt had dizziness prior to admission but denies syncope  - continue metoprolol to 25mg po BID  - amiodarone 400mg po BID for 2 weeks (started on 3/2)  then decrease to 200mg daily.   Black box warning of Amiodarone discussed with patient. Will need out patient monitoring of LFT's, TSH, Opthalmology evaluations and Pulmonary Function Tests.  - agree with longterm OAC  - recommend ILR implant next Monday as a longterm telemetry for PAF /PVC burden, r/b/i/a discussed with pt & daughter, agree to proceed   -GDMT for HFrEF  - maintain K+>4.0, Mg++>2.0,   - recommend repeat echo in 3months  Plan d/w pt/family//cardio/hospitalist

## 2023-03-03 NOTE — PROGRESS NOTE ADULT - SUBJECTIVE AND OBJECTIVE BOX
REASON FOR VISIT:  CHF, AF    HPI: 89 year old man with a history of hypertension, BPH, and arthritis who presented to the ED after seeing his PCP (Dr Umanzor) earlier today because of shortness of breath.  He hasn't felt well for a few weeks -- predominant complaint was dyspnea that worsened with activity and alleviated with rest; associated with the onset of bilateral leg edema.  He reports no past history of heart disease. In the ED he was diagnosed with new-onset atrial fibrillation and CHF.  He is presently comfortable (at rest); denies orthopnea/weight gain/angina.    3/27/23: no complaints, Tele: Afib , npo p midnight for LHC tomorrow    2/28/23: Patient awake without complaints of cp or sob.  LHC postponed due to elevated creatinine.  Tele: Afib 90's-120, frequent PVC's, couplets, triplets  3/1/23: no complaints; Tele: Afib, PVCs, now with COVID, s/p LHC - non obstructive CAD  3/3/23: Patient sitting up in chair.  Denies cp or sob.  Tele: SB-RSR, PVC's    MEDICATIONS  (STANDING):  aMIOdarone    Tablet 400 milliGRAM(s) Oral two times a day  apixaban 5 milliGRAM(s) Oral every 12 hours  aspirin enteric coated 81 milliGRAM(s) Oral daily  atorvastatin 40 milliGRAM(s) Oral at bedtime  dexAMETHasone     Tablet 6 milliGRAM(s) Oral daily  furosemide    Tablet 40 milliGRAM(s) Oral daily  latanoprost 0.005% Ophthalmic Solution 1 Drop(s) Both EYES at bedtime  melatonin 5 milliGRAM(s) Oral at bedtime  metoprolol tartrate 25 milliGRAM(s) Oral two times a day  multivitamin 1 Tablet(s) Oral daily  remdesivir  IVPB   IV Intermittent   remdesivir  IVPB 100 milliGRAM(s) IV Intermittent every 24 hours  tamsulosin 0.8 milliGRAM(s) Oral at bedtime  timolol 0.5% Solution 1 Drop(s) Both EYES two times a day    MEDICATIONS  (PRN):  diclofenac 75 milliGRAM(s) Oral two times a day PRN Moderate Pain (4 - 6)    Vital Signs Last 24 Hrs  T(C): 36.1 (03 Mar 2023 07:55), Max: 36.4 (02 Mar 2023 19:59)  T(F): 97 (03 Mar 2023 07:55), Max: 97.5 (02 Mar 2023 19:59)  HR: 69 (03 Mar 2023 09:35) (62 - 80)  BP: 100/54 (03 Mar 2023 09:35) (100/54 - 145/78)  BP(mean): --  RR: 17 (03 Mar 2023 09:35) (17 - 22)  SpO2: 97% (03 Mar 2023 09:35) (96% - 100%)    Parameters below as of 03 Mar 2023 09:35  Patient On (Oxygen Delivery Method): room air      PHYSICAL EXAM:  Constitutional: NAD, awake and alert, appears stated age  HEENT:   No oral cyanosis.  Pulmonary: Non-labored, breath sounds are clear bilaterally, No wheezing, rales or rhonchi  Cardiovascular: Irregular, normal S1/S2  Gastrointestinal: Bowel Sounds present, soft, nontender.   Lymph: Pitting b/l leg edema.   Neurological: Alert, no focal deficits  Skin: No rashes.  Psych:  Mood & affect appropriate    LABS:                                         11.7   6.74  )-----------( 137      ( 02 Mar 2023 06:49 )             37.1     03-03    138  |  105  |  71<H>  ----------------------------<  120<H>  3.5   |  29  |  1.34<H>    Ca    8.6      03 Mar 2023 07:39  Mg     2.2     03-03    TPro  6.2  /  Alb  3.0<L>  /  TBili  0.9  /  DBili  0.4<H>  /  AST  28  /  ALT  46  /  AlkPhos  81  03-03      03-02    143  |  106  |  71<H>  ----------------------------<  104<H>  3.6   |  29  |  1.34<H>    Ca    9.1      02 Mar 2023 06:49  Mg     2.4     03-02    TPro  6.4  /  Alb  3.1<L>  /  TBili  1.3<H>  /  DBili  0.5<H>  /  AST  34  /  ALT  47  /  AlkPhos  83  03-02    - TroponinI hsT: <-74.67, <-90.54  03-01    143  |  108  |  69<H>  ----------------------------<  125<H>  4.2   |  28  |  1.48<H>    Ca    9.2      01 Mar 2023 06:27  Mg     2.5     02-28    TPro  6.5  /  Alb  3.4  /  TBili  1.2  /  DBili  x   /  AST  30  /  ALT  42  /  AlkPhos  85  02-28    - TroponinI hsT: <-74.67, <-90.54    02-28    140  |  106  |  68<H>  ----------------------------<  154<H>  3.6   |  26  |  1.76<H>    Ca    9.2      28 Feb 2023 07:32  Mg     2.5     02-28    TPro  6.5  /  Alb  3.4  /  TBili  1.2  /  DBili  x   /  AST  30  /  ALT  42  /  AlkPhos  85  02-28      139    |  109    |  55     ----------------------------<  110    4.2     |  25     |  1.32     Ca    9.3        24 Feb 2023 15:20  Mg     2.5       24 Feb 2023 15:20    TPro  7.0    /  Alb  3.6    /  TBili  1.0    /  DBili  x      /  AST  18     /  ALT  24     /  AlkPhos  82     24 Feb 2023 15:20    PT/INR - ( 24 Feb 2023 15:20 )   PT: 14.2 sec;   INR: 1.22 ratio    PTT - ( 24 Feb 2023 15:20 )  PTT:31.3 sec    Pro Bnp [id number]    ECG: AF, RVR, RBBB      < from: TTE Echo Complete w/o Contrast w/ Doppler (02.25.23 @ 08:52) >   Impression     Summary     The mitral valve leaflets appear thickened.   Moderate (2+) mitral regurgitation ispresent.   Mild aortic sclerosis is present with normal valvular opening.   Trace aortic regurgitation is present.   Moderate to severe (3+) tricuspid valve regurgitation is present.   Severe pulmonary hypertension.   The left atrium is mildly dilated.   Left ventricle systolic function appears impaired; segmental wall motion   abnormalities noted. Estimated Ejection Fraction is 35- 40%.   Mild concentric left ventricular hypertrophy is present.   The IVC is dilated with decreased respiratory variation.     Signature     ----------------------------------------------------------------   Electronically signed by Thad Camejo MD(Interpreting    < end of copied text >    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 LVEDP   21.     Recommendations     Recommend aggressive medical management of non-obstructive CAD as per   ACC/AHA guidelines. Consider EPS involvement given significant ectopy.

## 2023-03-03 NOTE — PROGRESS NOTE ADULT - SUBJECTIVE AND OBJECTIVE BOX
Subjective:  s/p cardiac cath  awake and alert    MEDICATIONS  (STANDING):  aMIOdarone    Tablet 400 milliGRAM(s) Oral two times a day  apixaban 5 milliGRAM(s) Oral every 12 hours  aspirin enteric coated 81 milliGRAM(s) Oral daily  atorvastatin 40 milliGRAM(s) Oral at bedtime  dexAMETHasone     Tablet 6 milliGRAM(s) Oral daily  furosemide    Tablet 40 milliGRAM(s) Oral daily  latanoprost 0.005% Ophthalmic Solution 1 Drop(s) Both EYES at bedtime  melatonin 5 milliGRAM(s) Oral at bedtime  metoprolol tartrate 25 milliGRAM(s) Oral two times a day  multivitamin 1 Tablet(s) Oral daily  remdesivir  IVPB 100 milliGRAM(s) IV Intermittent every 24 hours  remdesivir  IVPB   IV Intermittent   tamsulosin 0.8 milliGRAM(s) Oral at bedtime  timolol 0.5% Solution 1 Drop(s) Both EYES two times a day    MEDICATIONS  (PRN):  diclofenac 75 milliGRAM(s) Oral two times a day PRN Moderate Pain (4 - 6)      Allergies    No Known Allergies    Intolerances        REVIEW OF SYSTEMS:    CONSTITUTIONAL:  As per HPI.  HEENT:  Eyes:  No diplopia or blurred vision. ENT:  No earache, sore throat or runny nose.  CARDIOVASCULAR:  No pressure, squeezing, tightness, heaviness or aching about the chest, neck, axilla or epigastrium.  RESPIRATORY:  sob  GASTROINTESTINAL:  No nausea, vomiting or diarrhea.  GENITOURINARY:  No dysuria, frequency or urgency.  MUSCULOSKELETAL:  no joint pain, deformity, tenderness  EXTREMITIES: no clubbing cyanosis,edema  SKIN:  No change in skin, hair or nails.  NEUROLOGIC:  No paresthesias, fasciculations, seizures or weakness.  PSYCHIATRIC:  No disorder of thought or mood.  ENDOCRINE:  No heat or cold intolerance, polyuria or polydipsia.  HEMATOLOGICAL:  No easy bruising or bleedings:    Vital Signs Last 24 Hrs  T(C): 36.1 (03 Mar 2023 07:55), Max: 36.4 (02 Mar 2023 19:59)  T(F): 97 (03 Mar 2023 07:55), Max: 97.5 (02 Mar 2023 19:59)  HR: 62 (03 Mar 2023 07:55) (62 - 80)  BP: 111/63 (03 Mar 2023 07:55) (104/65 - 145/78)  BP(mean): --  RR: 18 (03 Mar 2023 07:55) (18 - 22)  SpO2: 100% (03 Mar 2023 07:55) (96% - 100%)    Parameters below as of 03 Mar 2023 07:55  Patient On (Oxygen Delivery Method): nasal cannula        PHYSICAL EXAMINATION:  SKIN: no rashes  HEAD: NC/AT  EYES: PERRLA, EOMI  NECK:  Supple. No lymphadenopathy. Jugular venous pressure not elevated. Carotids equal.   HEART:   S1S2 irreg  CHEST:  decreased bs right base  ABDOMEN:  Soft and nontender.   EXTREMITIES:  no C/C/E  NEURO: AAO x 3, no focal deficts       LABS:                        11.7   6.74  )-----------( 137      ( 02 Mar 2023 06:49 )             37.1     03-02    143  |  106  |  71<H>  ----------------------------<  104<H>  3.6   |  29  |  1.34<H>    Ca    9.1      02 Mar 2023 06:49  Mg     2.4     03-02    TPro  6.4  /  Alb  3.1<L>  /  TBili  1.3<H>  /  DBili  0.5<H>  /  AST  34  /  ALT  47  /  AlkPhos  83  03-02    PT/INR - ( 02 Mar 2023 06:50 )   PT: 15.0 sec;   INR: 1.29 ratio               RADIOLOGY & ADDITIONAL TESTS:

## 2023-03-04 LAB
ANION GAP SERPL CALC-SCNC: 7 MMOL/L — SIGNIFICANT CHANGE UP (ref 5–17)
BUN SERPL-MCNC: 70 MG/DL — HIGH (ref 7–23)
CALCIUM SERPL-MCNC: 8.8 MG/DL — SIGNIFICANT CHANGE UP (ref 8.5–10.1)
CHLORIDE SERPL-SCNC: 105 MMOL/L — SIGNIFICANT CHANGE UP (ref 96–108)
CO2 SERPL-SCNC: 26 MMOL/L — SIGNIFICANT CHANGE UP (ref 22–31)
CREAT SERPL-MCNC: 1.35 MG/DL — HIGH (ref 0.5–1.3)
EGFR: 50 ML/MIN/1.73M2 — LOW
GLUCOSE SERPL-MCNC: 183 MG/DL — HIGH (ref 70–99)
MAGNESIUM SERPL-MCNC: 2.2 MG/DL — SIGNIFICANT CHANGE UP (ref 1.6–2.6)
POTASSIUM SERPL-MCNC: 3.8 MMOL/L — SIGNIFICANT CHANGE UP (ref 3.5–5.3)
POTASSIUM SERPL-SCNC: 3.8 MMOL/L — SIGNIFICANT CHANGE UP (ref 3.5–5.3)
SODIUM SERPL-SCNC: 138 MMOL/L — SIGNIFICANT CHANGE UP (ref 135–145)

## 2023-03-04 PROCEDURE — 99233 SBSQ HOSP IP/OBS HIGH 50: CPT

## 2023-03-04 PROCEDURE — 93010 ELECTROCARDIOGRAM REPORT: CPT

## 2023-03-04 RX ORDER — FUROSEMIDE 40 MG
40 TABLET ORAL ONCE
Refills: 0 | Status: COMPLETED | OUTPATIENT
Start: 2023-03-04 | End: 2023-03-04

## 2023-03-04 RX ADMIN — Medication 20 MILLIEQUIVALENT(S): at 10:16

## 2023-03-04 RX ADMIN — APIXABAN 5 MILLIGRAM(S): 2.5 TABLET, FILM COATED ORAL at 22:15

## 2023-03-04 RX ADMIN — Medication 1 TABLET(S): at 10:15

## 2023-03-04 RX ADMIN — Medication 1 DROP(S): at 05:16

## 2023-03-04 RX ADMIN — APIXABAN 5 MILLIGRAM(S): 2.5 TABLET, FILM COATED ORAL at 10:15

## 2023-03-04 RX ADMIN — TAMSULOSIN HYDROCHLORIDE 0.8 MILLIGRAM(S): 0.4 CAPSULE ORAL at 22:15

## 2023-03-04 RX ADMIN — Medication 25 MILLIGRAM(S): at 10:16

## 2023-03-04 RX ADMIN — AMIODARONE HYDROCHLORIDE 400 MILLIGRAM(S): 400 TABLET ORAL at 22:16

## 2023-03-04 RX ADMIN — Medication 81 MILLIGRAM(S): at 10:15

## 2023-03-04 RX ADMIN — ATORVASTATIN CALCIUM 40 MILLIGRAM(S): 80 TABLET, FILM COATED ORAL at 22:15

## 2023-03-04 RX ADMIN — Medication 5 MILLIGRAM(S): at 22:16

## 2023-03-04 RX ADMIN — AMIODARONE HYDROCHLORIDE 400 MILLIGRAM(S): 400 TABLET ORAL at 10:16

## 2023-03-04 RX ADMIN — Medication 25 MILLIGRAM(S): at 22:16

## 2023-03-04 RX ADMIN — Medication 6 MILLIGRAM(S): at 10:15

## 2023-03-04 RX ADMIN — REMDESIVIR 200 MILLIGRAM(S): 5 INJECTION INTRAVENOUS at 17:45

## 2023-03-04 RX ADMIN — Medication 1 DROP(S): at 17:46

## 2023-03-04 RX ADMIN — Medication 40 MILLIGRAM(S): at 10:19

## 2023-03-04 RX ADMIN — LATANOPROST 1 DROP(S): 0.05 SOLUTION/ DROPS OPHTHALMIC; TOPICAL at 22:18

## 2023-03-04 NOTE — PROVIDER CONTACT NOTE (OTHER) - ACTION/TREATMENT ORDERED:
Continue to monitor
MD Treadwell aware, no new orders at this time.
No new orders at this time. Continue to monitor.

## 2023-03-04 NOTE — PROGRESS NOTE ADULT - PROBLEM SELECTOR PLAN 2
Moderate LV dysfunction 35-40% with segmental wall motion abnormalities on echo raising suspicion for underlying CAD; elevated [id number]  S/P LHC-nonobstructive CAD  GDMT limited by CKD - cont. BB, no ACEI/ARB/ARNI due to CKD  Pt with LE edema and + weight gain.  Will give IV lasix 40mg x 1.  Continue daily weight, strict I&Os, fluid restriction 2L/day Moderate LV dysfunction 35-40% with segmental wall motion abnormalities on echo raising suspicion for underlying CAD; elevated [id number]  S/P LHC-nonobstructive CAD  GDMT limited by CKD - cont. BB, no ACEI/ARB/ARNI due to CKD  Pt with LE edema and + weight gain.  Will give IV lasix 40mg x 1.  Continue daily weight, strict I&Os, fluid restriction 2L/day  As per pulmonary plan for thoracentesis

## 2023-03-04 NOTE — PROGRESS NOTE ADULT - SUBJECTIVE AND OBJECTIVE BOX
REASON FOR VISIT:  CHF, AF    HPI: 89 year old man with a history of hypertension, BPH, and arthritis who presented to the ED after seeing his PCP (Dr Umanzor) earlier today because of shortness of breath.  He hasn't felt well for a few weeks -- predominant complaint was dyspnea that worsened with activity and alleviated with rest; associated with the onset of bilateral leg edema.  He reports no past history of heart disease. In the ED he was diagnosed with new-onset atrial fibrillation and CHF.  He is presently comfortable (at rest); denies orthopnea/weight gain/angina.    3/27/23: no complaints, Tele: Afib , npo p midnight for LHC tomorrow    2/28/23: Patient awake without complaints of cp or sob.  LHC postponed due to elevated creatinine.  Tele: Afib 90's-120, frequent PVC's, couplets, triplets  3/1/23: no complaints; Tele: Afib, PVCs, now with COVID, s/p LHC - non obstructive CAD  3/3/23: Patient sitting up in chair.  Denies cp or sob.  Tele: SB-RSR, PVC's  3/4/23: Patient without complaints.  Denies cp or sob.  +LE edema.  Tele: SB-RSR, PVC's episode of PAT (150 bpm) last night lasting 9.56 sec.    MEDICATIONS  (STANDING):  aMIOdarone    Tablet 400 milliGRAM(s) Oral two times a day  apixaban 5 milliGRAM(s) Oral every 12 hours  aspirin enteric coated 81 milliGRAM(s) Oral daily  atorvastatin 40 milliGRAM(s) Oral at bedtime  dexAMETHasone     Tablet 6 milliGRAM(s) Oral daily  furosemide    Tablet 40 milliGRAM(s) Oral daily  latanoprost 0.005% Ophthalmic Solution 1 Drop(s) Both EYES at bedtime  melatonin 5 milliGRAM(s) Oral at bedtime  metoprolol tartrate 25 milliGRAM(s) Oral two times a day  multivitamin 1 Tablet(s) Oral daily  potassium chloride    Tablet ER 20 milliEquivalent(s) Oral daily  remdesivir  IVPB 100 milliGRAM(s) IV Intermittent every 24 hours  remdesivir  IVPB   IV Intermittent   tamsulosin 0.8 milliGRAM(s) Oral at bedtime  timolol 0.5% Solution 1 Drop(s) Both EYES two times a day    MEDICATIONS  (PRN):  diclofenac 75 milliGRAM(s) Oral two times a day PRN Moderate Pain (4 - 6)    Vital Signs Last 24 Hrs  T(C): 36.6 (04 Mar 2023 08:17), Max: 36.6 (04 Mar 2023 08:17)  T(F): 97.8 (04 Mar 2023 08:17), Max: 97.8 (04 Mar 2023 08:17)  HR: 62 (04 Mar 2023 08:17) (62 - 65)  BP: 119/69 (04 Mar 2023 08:17) (109/61 - 125/79)  BP(mean): 73 (03 Mar 2023 15:17) (73 - 73)  RR: 17 (04 Mar 2023 08:17) (15 - 17)  SpO2: 97% (04 Mar 2023 08:17) (97% - 99%)    Parameters below as of 04 Mar 2023 08:17  Patient On (Oxygen Delivery Method): room air        PHYSICAL EXAM:  Constitutional: NAD, awake and alert, appears stated age  HEENT:   No oral cyanosis.  Pulmonary: Non-labored, breath sounds diminished right, No wheezing, rales or rhonchi  Cardiovascular:   Irregular, normal S1/S2  Gastrointestinal: Bowel Sounds present, soft, nontender.   Lymph: Pitting b/l leg edema 2+.   Neurological: Alert, no focal deficits  Skin: No rashes.  Psych:  Mood & affect appropriate    LABS:                                         11.7   6.74  )-----------( 137      ( 02 Mar 2023 06:49 )             37.1     03-04    138  |  105  |  70<H>  ----------------------------<  183<H>  3.8   |  26  |  1.35<H>    Ca    8.8      04 Mar 2023 06:54  Mg     2.2     03-04    TPro  6.2  /  Alb  3.0<L>  /  TBili  0.8  /  DBili  0.3  /  AST  25  /  ALT  39  /  AlkPhos  76  03-04      03-03    138  |  105  |  71<H>  ----------------------------<  120<H>  3.5   |  29  |  1.34<H>    Ca    8.6      03 Mar 2023 07:39  Mg     2.2     03-03    TPro  6.2  /  Alb  3.0<L>  /  TBili  0.9  /  DBili  0.4<H>  /  AST  28  /  ALT  46  /  AlkPhos  81  03-03      03-02    143  |  106  |  71<H>  ----------------------------<  104<H>  3.6   |  29  |  1.34<H>    Ca    9.1      02 Mar 2023 06:49  Mg     2.4     03-02    TPro  6.4  /  Alb  3.1<L>  /  TBili  1.3<H>  /  DBili  0.5<H>  /  AST  34  /  ALT  47  /  AlkPhos  83  03-02    - TroponinI hsT: <-74.67, <-90.54  03-01    143  |  108  |  69<H>  ----------------------------<  125<H>  4.2   |  28  |  1.48<H>    Ca    9.2      01 Mar 2023 06:27  Mg     2.5     02-28    TPro  6.5  /  Alb  3.4  /  TBili  1.2  /  DBili  x   /  AST  30  /  ALT  42  /  AlkPhos  85  02-28    - TroponinI hsT: <-74.67, <-90.54    02-28    140  |  106  |  68<H>  ----------------------------<  154<H>  3.6   |  26  |  1.76<H>    Ca    9.2      28 Feb 2023 07:32  Mg     2.5     02-28    TPro  6.5  /  Alb  3.4  /  TBili  1.2  /  DBili  x   /  AST  30  /  ALT  42  /  AlkPhos  85  02-28      139    |  109    |  55     ----------------------------<  110    4.2     |  25     |  1.32     Ca    9.3        24 Feb 2023 15:20  Mg     2.5       24 Feb 2023 15:20    TPro  7.0    /  Alb  3.6    /  TBili  1.0    /  DBili  x      /  AST  18     /  ALT  24     /  AlkPhos  82     24 Feb 2023 15:20    PT/INR - ( 24 Feb 2023 15:20 )   PT: 14.2 sec;   INR: 1.22 ratio    PTT - ( 24 Feb 2023 15:20 )  PTT:31.3 sec    Pro Bnp [id number]    ECG: AF, RVR, RBBB      < from: TTE Echo Complete w/o Contrast w/ Doppler (02.25.23 @ 08:52) >   Impression     Summary     The mitral valve leaflets appear thickened.   Moderate (2+) mitral regurgitation ispresent.   Mild aortic sclerosis is present with normal valvular opening.   Trace aortic regurgitation is present.   Moderate to severe (3+) tricuspid valve regurgitation is present.   Severe pulmonary hypertension.   The left atrium is mildly dilated.   Left ventricle systolic function appears impaired; segmental wall motion   abnormalities noted. Estimated Ejection Fraction is 35- 40%.   Mild concentric left ventricular hypertrophy is present.   The IVC is dilated with decreased respiratory variation.     Signature     ----------------------------------------------------------------   Electronically signed by Thad Camejo MD(Interpreting    < end of copied text >    [facility] 3/2/2023     Impression     Diagnostic Conclusions     Non-obstructive coronary artery disease with heavy calcification. LVEDP   21.     Recommendations     Recommend aggressive medical management of non-obstructive CAD as per   ACC/AHA guidelines. Consider EPS involvement given significant ectopy.

## 2023-03-04 NOTE — PROGRESS NOTE ADULT - SUBJECTIVE AND OBJECTIVE BOX
Chief Complaint: Shortness of breath with exertion, bilateral leg swelling    Interval Hx: Patient seen and examined. He is subjectively improved since admission. No longer dyspneic. Still has B/L LE edema. No other acute complaints. Oxygenation has significantly improved. Weaned from 4L/min O2 via NC to now room air, SPO2 97%. Furosemide now PO. Receiving COVID19 treatment with remdesivir and dexamethasone. Tele w/ SR, PVCs, episode of PAT to 150 bpm last night lasting 9.56 sec. Cardiac EP continues to follow. Amio loading in process. Anticipated to need ILR on Monday 3/6.     ROS: Multi system review is comprehensively negative x 10 systems except as above    Vitals:  T(F): 97.8 (04 Mar 2023 08:17), Max: 97.8 (04 Mar 2023 08:17)  HR: 62 (04 Mar 2023 08:17) (62 - 65)  BP: 119/69 (04 Mar 2023 08:17) (109/61 - 125/79)  RR: 17 (04 Mar 2023 08:17) (15 - 17)  SpO2: 97% (04 Mar 2023 08:17) (97% - 99%) on room air    Exam:  Gen: No acute distress  HEENT: NCAT PERRL EOMI MMM clear oropharynx  Neck: Supple, no LAD, no thyromegaly  Chest: Normal resp effort at rest, diminished breath sounds at bases B/L  CVS: s1 s2 normal, RRR  Abd: +BS, soft NT ND   Ext: +B/L LE edema, no tenderness, normal cap refill  Skin: Warm, dry  Mood: calm, pleasant  Neuro: Awake and alert, answers questions appropriately, follows commands, no gross deficits    Labs:                      11.7   6.74 )-----------( 137             37.1       138  |  105  |  70  ----------------------< 183  3.8   |   26   |  1.35    Ca 8.8   Mg 2.2    TPro  6.2  /  Alb  3.0  /  TBili  0.8  /  DBili  0.3  /  AST  25  /  ALT  39  /  AlkPhos  76      PT: 15.0 sec;   INR: 1.29 ratio      Troponin 90, 75   proBNP 11,157   Ddimer 1163    Urinalysis 2/28: Yellow, clear, ket neg, prot 30, N neg, LE small, RBC > 50, WBC 11-25, bact few    Micro:  COVID19 PCR 2/27: Positive   Resp pathogens PCR 2/24: Negative  COVID19 PCR 2/24: Negative    Imaging:  CXR 3/1: Gross heart enlargement again noted. There are slightly increasing right base pleural pulmonary process compared to February 24. Otherwise no change.    CTA chest W/ 2/24: There are no filling defects in the main pulmonary artery or its lobar and segmental branches. Cardiomegaly. There is a small-moderate circumferential pericardial effusion. The great vessels are normal in size. Moderate right and small left pleural effusions. There is mild groundglass opacity in the right upper lobe, likely secondary to pulmonary edema. Mild consolidation in the right lower lobe, likely atelectasis. The central airways are patent. No thoracic adenopathy. Fluid attenuating nodule in the left adrenal gland, possibly an adrenal adenoma. Perihepatic ascites. No aggressive osseous lesions.    CXR 2/24: Moderate enlargement of cardiac silhouette. Left lung is clear. Small right effusion/consolidation.    Cardiac Testing:  Tele 3/4: SR, 60s-80s, PVCs, episode of PAT to 150 bpm last night lasting 9.56 sec    Tele 3/3: SR 60-80's PVC, Ventricular bigeminy    Cardiac Cath 3/2: Non-obstructive coronary artery disease with heavy calcification. LVEDP 21.    EKG 3/2: Rate 75. SR with PVCs, DC 170ms, QRS 186ms, QT 470ms, QTC 524ms (corrected for RBBB 438ms)    Tele 3/2: Converted to NSR but with paroxysms of afib, RVR this AM, though HR now better controlled, 70s. Still lots of ectopy.     Tele 3/1: Afib, rates 80s-100s, PVCs, 11 sec of WCT likely NSVT    TTE 2/25:  The mitral valve leaflets appear thickened. Moderate (2+) mitral regurgitation present. Mild aortic sclerosis is present with normal valvular opening. Trace aortic regurgitation is present. Moderate to severe (3+) tricuspid valve regurgitation is present. Severe pulmonary hypertension. The left atrium is mildly dilated. Left ventricle systolic function appears impaired; segmental wall motion abnormalities noted. Estimated Ejection Fraction is 35- 40%. Mild concentric left ventricular hypertrophy is present. The IVC is dilated with decreased respiratory variation.    EKG 2/24: Rate 106. Atrial fibrillation with rapid ventricular response with premature ventricular or aberrantly conducted complexes. Right bundle branch block.    Meds:  MEDICATIONS  (STANDING):  aMIOdarone    Tablet 400 milliGRAM(s) Oral two times a day  apixaban 5 milliGRAM(s) Oral every 12 hours  aspirin enteric coated 81 milliGRAM(s) Oral daily  atorvastatin 40 milliGRAM(s) Oral at bedtime  dexAMETHasone     Tablet 6 milliGRAM(s) Oral daily  furosemide    Tablet 40 milliGRAM(s) Oral daily  latanoprost 0.005% Ophthalmic Solution 1 Drop(s) Both EYES at bedtime  melatonin 5 milliGRAM(s) Oral at bedtime  metoprolol tartrate 25 milliGRAM(s) Oral two times a day  multivitamin 1 Tablet(s) Oral daily  potassium chloride    Tablet ER 20 milliEquivalent(s) Oral daily  remdesivir  IVPB 100 milliGRAM(s) IV Intermittent every 24 hours  tamsulosin 0.8 milliGRAM(s) Oral at bedtime  timolol 0.5% Solution 1 Drop(s) Both EYES two times a day    MEDICATIONS  (PRN):  diclofenac 75 milliGRAM(s) Oral two times a day PRN Moderate Pain (4 - 6)   Chief Complaint: Shortness of breath with exertion, bilateral leg swelling    Interval Hx: Patient seen and examined. He is subjectively improved since admission. No longer dyspneic. Still has B/L LE edema. No other acute complaints. Receiving COVID19 treatment with remdesivir and dexamethasone since this was detected mid-admission. Oxygenation has significantly improved. Weaned from 4L/min O2 via NC to now room air, SPO2 97%. Furosemide now PO. Tele w/ SR, PVCs, episode of PAT to 150 bpm last night lasting 9.56 sec. Cardiac EP continues to follow. Amiodarone loading in-process. Anticipated to need ILR on Monday 3/6.     ROS: Multi system review is comprehensively negative x 10 systems except as above    Vitals:  T(F): 97.8 (04 Mar 2023 08:17), Max: 97.8 (04 Mar 2023 08:17)  HR: 62 (04 Mar 2023 08:17) (62 - 65)  BP: 119/69 (04 Mar 2023 08:17) (109/61 - 125/79)  RR: 17 (04 Mar 2023 08:17) (15 - 17)  SpO2: 97% (04 Mar 2023 08:17) (97% - 99%) on room air    Exam:  Gen: No acute distress  HEENT: NCAT PERRL EOMI MMM clear oropharynx  Neck: Supple, no LAD, no thyromegaly  Chest: Normal resp effort at rest, diminished breath sounds at bases B/L  CVS: s1 s2 normal, RRR  Abd: +BS, soft NT ND   Ext: +B/L LE edema, no tenderness, normal cap refill  Skin: Warm, dry  Mood: calm, pleasant  Neuro: Awake and alert, answers questions appropriately, follows commands, no gross deficits    Labs:                      11.7   6.74 )-----------( 137             37.1       138  |  105  |  70  ----------------------< 183  3.8   |   26   |  1.35    Ca 8.8   Mg 2.2    TPro  6.2  /  Alb  3.0  /  TBili  0.8  /  DBili  0.3  /  AST  25  /  ALT  39  /  AlkPhos  76      PT: 15.0 sec;   INR: 1.29 ratio      Troponin 90, 75   proBNP 11,157   Ddimer 1163    Urinalysis 2/28: Yellow, clear, ket neg, prot 30, N neg, LE small, RBC > 50, WBC 11-25, bact few    Micro:  COVID19 PCR 2/27: Positive   Resp pathogens PCR 2/24: Negative  COVID19 PCR 2/24: Negative    Imaging:  CXR 3/1: Gross heart enlargement again noted. There are slightly increasing right base pleural pulmonary process compared to February 24. Otherwise no change.    CTA chest W/ 2/24: There are no filling defects in the main pulmonary artery or its lobar and segmental branches. Cardiomegaly. There is a small-moderate circumferential pericardial effusion. The great vessels are normal in size. Moderate right and small left pleural effusions. There is mild groundglass opacity in the right upper lobe, likely secondary to pulmonary edema. Mild consolidation in the right lower lobe, likely atelectasis. The central airways are patent. No thoracic adenopathy. Fluid attenuating nodule in the left adrenal gland, possibly an adrenal adenoma. Perihepatic ascites. No aggressive osseous lesions.    CXR 2/24: Moderate enlargement of cardiac silhouette. Left lung is clear. Small right effusion/consolidation.    Cardiac Testing:  Tele 3/4: SR, 60s-80s, PVCs, episode of PAT to 150 bpm last night lasting 9.56 sec    Tele 3/3: SR 60-80's PVC, Ventricular bigeminy    Cardiac Cath 3/2: Non-obstructive coronary artery disease with heavy calcification. LVEDP 21.    EKG 3/2: Rate 75. SR with PVCs, AZ 170ms, QRS 186ms, QT 470ms, QTC 524ms (corrected for RBBB 438ms)    Tele 3/2: Converted to NSR but with paroxysms of afib, RVR this AM, though HR now better controlled, 70s. Still lots of ectopy.     Tele 3/1: Afib, rates 80s-100s, PVCs, 11 sec of WCT likely NSVT    TTE 2/25:  The mitral valve leaflets appear thickened. Moderate (2+) mitral regurgitation present. Mild aortic sclerosis is present with normal valvular opening. Trace aortic regurgitation is present. Moderate to severe (3+) tricuspid valve regurgitation is present. Severe pulmonary hypertension. The left atrium is mildly dilated. Left ventricle systolic function appears impaired; segmental wall motion abnormalities noted. Estimated Ejection Fraction is 35- 40%. Mild concentric left ventricular hypertrophy is present. The IVC is dilated with decreased respiratory variation.    EKG 2/24: Rate 106. Atrial fibrillation with rapid ventricular response with premature ventricular or aberrantly conducted complexes. Right bundle branch block.    Meds:  MEDICATIONS  (STANDING):  aMIOdarone    Tablet 400 milliGRAM(s) Oral two times a day  apixaban 5 milliGRAM(s) Oral every 12 hours  aspirin enteric coated 81 milliGRAM(s) Oral daily  atorvastatin 40 milliGRAM(s) Oral at bedtime  dexAMETHasone     Tablet 6 milliGRAM(s) Oral daily  furosemide    Tablet 40 milliGRAM(s) Oral daily  latanoprost 0.005% Ophthalmic Solution 1 Drop(s) Both EYES at bedtime  melatonin 5 milliGRAM(s) Oral at bedtime  metoprolol tartrate 25 milliGRAM(s) Oral two times a day  multivitamin 1 Tablet(s) Oral daily  potassium chloride    Tablet ER 20 milliEquivalent(s) Oral daily  remdesivir  IVPB 100 milliGRAM(s) IV Intermittent every 24 hours  tamsulosin 0.8 milliGRAM(s) Oral at bedtime  timolol 0.5% Solution 1 Drop(s) Both EYES two times a day    MEDICATIONS  (PRN):  diclofenac 75 milliGRAM(s) Oral two times a day PRN Moderate Pain (4 - 6)

## 2023-03-04 NOTE — PROVIDER CONTACT NOTE (OTHER) - BACKGROUND
Pt presented with dyspnea and new onset afib. Hx of CHF, HTN, BPH
RN made aware during change of shift report with day shift that pt has had similar episodes on cardiac monitor, day team MD aware
Pt A fib with frequent PVC on monitor.

## 2023-03-04 NOTE — PROGRESS NOTE ADULT - ASSESSMENT
89 year old man with HTN, BPH, arthritis, presented 2/24 with exertional dyspnea and B/L LE edema, found to have new onset afib with RVR and CHF. Admitted to Medicine.     Acute respiratory failure with hypoxia  Likely multifactorial due to decompensated CHF, pulmonary edema, B/L pleural effusions, atelectasis, pericardial effusion, and now diagnosis of COVID19. Improved with treatment of these issues. Weaned to room air today.   - Continue to manage underlying issues, see below    COVID19 infection, unable to rule out COVID19 pneumonia  Appreciate input from ID. Started patient on remdesivir and dexamethasone 3/1.   - Continue remdesivir, (3/1-), plan for 5-day course  - Continue dexamethasone (3/1-), plan for 10-day course  - Incentive spirometry  - Encourage mobility  - DVT px    New onset afib with RVR, PVCs, prolonged QTC  Converted to NSR 3/1 but with paroxysms of atrial fibrillation and periods of rapid response, significant PVC burden, prolonged QTC and infrequent wide complex tachycardia, likely NSVT. WCT appears to have resolved with amiodarone loading but still with PVCs, had PAT to 150 last night for ~10 sec. Appreciate input from Cardiology and Cardiac EP.   - Continue metoprolol. Amiodarone 400mg po BID for 2 weeks then plan to decrease to 400mg daily. Black box warning of Amiodarone discussed with patient. Will need out patient monitoring of LFT's, TSH, Opthalmology evaluations and PFTs.  - Continue long-term oral anticoagulation, on Eliquis  - ILR implantation, possibly on Monday as a long-term telemetry for AFib / PVC burden   - Maintain K ~4, Mg ~2  - TSH WNL  - Plan to repeat echo in 3 months  - Outpatient follow up with Dr. Carranza    Acute systolic CHF  BNP 11,157. LVEF 35-40% on TTE. Also noted to have Mod MR, mod to severe TR, pHTN. Appreciate input from Cardiology. L heart cath 3/2. Non-obstructive coronary artery disease with heavy calcification. LVEDP 21. Continued on diuretics, metoprolol. Unable to use ACEI/ARB/ARNI at the present due to CrCl. Volume status improving, now on room air, still with B/L LE edema. Cardiology has switched IV Lasix to PO.  - Continue furosemide, metoprolol  - Strict Is and Os, daily wt, fluid restriction    Pulmonary HTN  As noted on TTE. Cardiac cath done, elevated LVED noted.   - Continue management of CHF    HTN  BP improved.  - Continue to monitor    Pericardial effusion  Rather small in side. No sign of tamponade.   - Continue to monitor    Abnormal urinalysis  UA with negative nitrite, small LE, >50 RBC, 11-25 WBCs, few bact. No dysuria or gross hematuria. No suprapubic pain for flank pain. Urine culture negative.   - Monitoring off antibiotics.     BPH  Stable  - Continue tamsulosin    Physical deconditioning and debility  PT consulted, recommended [facility] vs home with home PT.  Awaiting final PT recommendation.  - Continue restorative PT sessions        DVT px: On Eliquis for afib  Code status: Full code  Dispo: Anticipate patient to remain inpatient into Monday 3/6 for monitoring amiodarone load in setting of heavy PVC burden, prolonged QTc, wide QRS, also continued diuresis and supportive care measures. May be stable for DC Monday 3/6 after ILR.    89 year old man with HTN, BPH, arthritis, presented 2/24 with exertional dyspnea and B/L LE edema, found to have new onset afib with RVR and CHF. Admitted to Medicine.     Acute respiratory failure with hypoxia  Likely multifactorial due to decompensated CHF, pulmonary edema, B/L pleural effusions, atelectasis, pericardial effusion, and diagnosis of COVID19 made mid-admission. Improved with treatment of these issues. Weaned to room air today.   - Continue to manage underlying issues, see below    COVID19 infection, unable to rule out COVID19 pneumonia  Appreciate input from ID. Started patient on remdesivir and dexamethasone 3/1.   - Continue remdesivir, (3/1-), plan for 5-day course  - Continue dexamethasone (3/1-), plan for 10-day course  - Incentive spirometry  - Encourage mobility  - DVT px    New onset afib with RVR, PVCs, prolonged QTC  Converted to NSR 3/1 but with paroxysms of atrial fibrillation and periods of rapid response, significant PVC burden, prolonged QTC and infrequent wide complex tachycardia, likely NSVT. WCT appears to have resolved with amiodarone loading but still with PVCs, had PAT to 150 last night for ~10 sec. Appreciate input from Cardiology and Cardiac EP.   - Continue metoprolol. Amiodarone 400mg po BID for 2 weeks then plan to decrease to 400mg daily. Black box warning of Amiodarone discussed with patient. Will need out patient monitoring of LFT's, TSH, Opthalmology evaluations and PFTs.  - Continue long-term oral anticoagulation, on Eliquis  - ILR implantation, possibly on Monday as a long-term telemetry for AFib / PVC burden   - Maintain K ~4, Mg ~2  - TSH WNL  - Plan to repeat echo in 3 months  - Outpatient follow up with Dr. Carranza    Acute systolic CHF  BNP 11,157. LVEF 35-40% on TTE. Also noted to have Mod MR, mod to severe TR, pHTN. Appreciate input from Cardiology. L heart cath 3/2. Non-obstructive coronary artery disease with heavy calcification. LVEDP 21. Continued on diuretics, metoprolol. Unable to use ACEI/ARB/ARNI at the present due to CrCl. Volume status improving, now on room air, still with B/L LE edema. Cardiology has switched IV Lasix to PO.  - Continue furosemide, metoprolol  - Strict Is and Os, daily wt, fluid restriction    Pulmonary HTN  As noted on TTE. Cardiac cath done, elevated LVED noted.   - Continue management of CHF    HTN  BP improved.  - Continue to monitor    Pericardial effusion  Rather small in side. No sign of tamponade.   - Continue to monitor    Abnormal urinalysis  UA with negative nitrite, small LE, >50 RBC, 11-25 WBCs, few bact. No dysuria or gross hematuria. No suprapubic pain for flank pain. Urine culture negative.   - Monitoring off antibiotics.     BPH  Stable  - Continue tamsulosin    Physical deconditioning and debility  PT consulted, recommended [facility] vs home with home PT.  Awaiting final PT recommendation.  - Continue restorative PT sessions        DVT px: On Eliquis for afib  Code status: Full code  Dispo: Anticipate patient to remain inpatient into Monday 3/6 for monitoring amiodarone load in setting of heavy PVC burden, prolonged QTc, wide QRS, also continued diuresis and supportive care measures. May be stable for DC Monday 3/6 after ILR.

## 2023-03-04 NOTE — PROGRESS NOTE ADULT - SUBJECTIVE AND OBJECTIVE BOX
Subjective:  awake and alert  no sob    MEDICATIONS  (STANDING):  aMIOdarone    Tablet 400 milliGRAM(s) Oral two times a day  apixaban 5 milliGRAM(s) Oral every 12 hours  aspirin enteric coated 81 milliGRAM(s) Oral daily  atorvastatin 40 milliGRAM(s) Oral at bedtime  dexAMETHasone     Tablet 6 milliGRAM(s) Oral daily  furosemide    Tablet 40 milliGRAM(s) Oral daily  latanoprost 0.005% Ophthalmic Solution 1 Drop(s) Both EYES at bedtime  melatonin 5 milliGRAM(s) Oral at bedtime  metoprolol tartrate 25 milliGRAM(s) Oral two times a day  multivitamin 1 Tablet(s) Oral daily  potassium chloride    Tablet ER 20 milliEquivalent(s) Oral daily  remdesivir  IVPB 100 milliGRAM(s) IV Intermittent every 24 hours  remdesivir  IVPB   IV Intermittent   tamsulosin 0.8 milliGRAM(s) Oral at bedtime  timolol 0.5% Solution 1 Drop(s) Both EYES two times a day    MEDICATIONS  (PRN):  diclofenac 75 milliGRAM(s) Oral two times a day PRN Moderate Pain (4 - 6)      Allergies    No Known Allergies    Intolerances        REVIEW OF SYSTEMS:    CONSTITUTIONAL:  As per HPI.  HEENT:  Eyes:  No diplopia or blurred vision. ENT:  No earache, sore throat or runny nose.  CARDIOVASCULAR:  No pressure, squeezing, tightness, heaviness or aching about the chest, neck, axilla or epigastrium.  RESPIRATORY:  No cough, shortness of breath, PND or orthopnea.  GASTROINTESTINAL:  No nausea, vomiting or diarrhea.  GENITOURINARY:  No dysuria, frequency or urgency.  MUSCULOSKELETAL:  no joint pain, deformity, tenderness  EXTREMITIES: no clubbing cyanosis,edema  SKIN:  No change in skin, hair or nails.  NEUROLOGIC:  No paresthesias, fasciculations, seizures or weakness.  PSYCHIATRIC:  No disorder of thought or mood.  ENDOCRINE:  No heat or cold intolerance, polyuria or polydipsia.  HEMATOLOGICAL:  No easy bruising or bleedings:    Vital Signs Last 24 Hrs  T(C): 36.6 (04 Mar 2023 08:17), Max: 36.6 (04 Mar 2023 08:17)  T(F): 97.8 (04 Mar 2023 08:17), Max: 97.8 (04 Mar 2023 08:17)  HR: 62 (04 Mar 2023 08:17) (62 - 69)  BP: 119/69 (04 Mar 2023 08:17) (100/54 - 125/79)  BP(mean): 73 (03 Mar 2023 15:17) (73 - 73)  RR: 17 (04 Mar 2023 08:17) (15 - 17)  SpO2: 97% (04 Mar 2023 08:17) (97% - 99%)    Parameters below as of 04 Mar 2023 08:17  Patient On (Oxygen Delivery Method): room air        PHYSICAL EXAMINATION:  SKIN: no rashes  HEAD: NC/AT  EYES: PERRLA, EOMI  EARS: TM's intact  NOSE: no abnormalities  NECK:  Supple. No lymphadenopathy. Jugular venous pressure not elevated. Carotids equal.   HEART:   The cardiac impulse has a normal quality. Reg., Nl S1 and S2.  There are no murmurs, rubs or gallops noted  CHEST:  decreased b right 1/4  ABDOMEN:  Soft and nontender.   EXTREMITIES:  no C/C/E  NEURO: AAO x 3, no focal deficts       LABS:    03-04    138  |  105  |  70<H>  ----------------------------<  183<H>  3.8   |  26  |  1.35<H>    Ca    8.8      04 Mar 2023 06:54  Mg     2.2     03-04    TPro  6.2  /  Alb  3.0<L>  /  TBili  0.8  /  DBili  0.3  /  AST  25  /  ALT  39  /  AlkPhos  76  03-04          RADIOLOGY & ADDITIONAL TESTS:

## 2023-03-04 NOTE — PROGRESS NOTE ADULT - ASSESSMENT
- cont O2  - patient has severe pulmonary HTN (RVSP 60) in setting of HFrEF and mitral regurg  - has right pleural effussion  - pulmonary HTN most likely due to left heart failure  - s/p cardiac cath - LVEF 30-35%  - optimize heart failure treatment  - plan for thoracentesis  - on remdesivir/decadron for covid; ? d/c

## 2023-03-04 NOTE — PROGRESS NOTE ADULT - SUBJECTIVE AND OBJECTIVE BOX
Date of service: 23 @ 12:43    OOB to chair  Has dry cough  SOB is improving    ROS: no fever or chills; denies dizziness, no HA, no abdominal pain, no diarrhea or constipation; no dysuria, no legs pain, no rashes    MEDICATIONS  (STANDING):  aMIOdarone    Tablet 400 milliGRAM(s) Oral two times a day  apixaban 5 milliGRAM(s) Oral every 12 hours  aspirin enteric coated 81 milliGRAM(s) Oral daily  atorvastatin 40 milliGRAM(s) Oral at bedtime  dexAMETHasone     Tablet 6 milliGRAM(s) Oral daily  furosemide    Tablet 40 milliGRAM(s) Oral daily  latanoprost 0.005% Ophthalmic Solution 1 Drop(s) Both EYES at bedtime  melatonin 5 milliGRAM(s) Oral at bedtime  metoprolol tartrate 25 milliGRAM(s) Oral two times a day  multivitamin 1 Tablet(s) Oral daily  potassium chloride    Tablet ER 20 milliEquivalent(s) Oral daily  remdesivir  IVPB 100 milliGRAM(s) IV Intermittent every 24 hours  remdesivir  IVPB   IV Intermittent   tamsulosin 0.8 milliGRAM(s) Oral at bedtime  timolol 0.5% Solution 1 Drop(s) Both EYES two times a day    Vital Signs Last 24 Hrs  T(C): 36.6 (04 Mar 2023 08:17), Max: 36.6 (04 Mar 2023 08:17)  T(F): 97.8 (04 Mar 2023 08:17), Max: 97.8 (04 Mar 2023 08:17)  HR: 62 (04 Mar 2023 08:17) (62 - 65)  BP: 119/69 (04 Mar 2023 08:17) (109/61 - 125/79)  BP(mean): 73 (03 Mar 2023 15:17) (73 - 73)  RR: 17 (04 Mar 2023 08:17) (15 - 17)  SpO2: 97% (04 Mar 2023 08:17) (97% - 99%)    Parameters below as of 04 Mar 2023 08:17  Patient On (Oxygen Delivery Method): room air     Physical exam:    Constitutional:  No acute distress  HEENT: NC/AT, EOMI, PERRLA, conjunctivae clear; ears and nose atraumatic  Neck: supple; thyroid not palpable  Back: no tenderness  Respiratory: respiratory effort normal; few crackles at bases  Cardiovascular: S1S2 regular, no murmurs  Abdomen: soft, not tender, not distended, positive BS  Genitourinary: no suprapubic tenderness  Lymphatic: no LN palpable  Musculoskeletal: no muscle tenderness, no joint swelling or tenderness  Extremities: no pedal edema  Neurological/ Psychiatric: AxOx3, moving all extremities  Skin: no rashes; no palpable lesions    Labs: reviewed        138  |  105  |  70<H>  ----------------------------<  183<H>  3.8   |  26  |  1.35<H>    Ca    8.8      04 Mar 2023 06:54  Mg     2.2     03-04    TPro  6.2  /  Alb  3.0<L>  /  TBili  0.8  /  DBili  0.3  /  AST  25  /  ALT  39  /  AlkPhos  76  03-04    D-Dimer Assay, Quantitative: 1163 ng/mL DDU (23 @ 15:20)                        11.7   6.74  )-----------( 137      ( 02 Mar 2023 06:49 )             37.1     -    138  |  105  |  71<H>  ----------------------------<  120<H>  3.5   |  29  |  1.34<H>    Ca    8.6      03 Mar 2023 07:39  Mg     2.2     03-03    TPro  6.2  /  Alb  3.0<L>  /  TBili  0.9  /  DBili  0.4<H>  /  AST  28  /  ALT  46  /  AlkPhos  81  03-03    D-Dimer Assay, Quantitative: 1163 ng/mL DDU (23 @ 15:20)                  143  |  108  |  69<H>  ----------------------------<  125<H>  4.2   |  28  |  1.48<H>    Ca    9.2      01 Mar 2023 06:27  Mg     2.5         TPro  6.5  /  Alb  3.4  /  TBili  1.2  /  DBili  x   /  AST  30  /  ALT  42  /  AlkPhos  85       LIVER FUNCTIONS - ( 2023 07:32 )  Alb: 3.4 g/dL / Pro: 6.5 gm/dL / ALK PHOS: 85 U/L / ALT: 42 U/L / AST: 30 U/L / GGT: x           Urinalysis Basic - ( 2023 18:40 )    Color: Yellow / Appearance: Clear / S.020 / pH: x  Gluc: x / Ketone: Negative  / Bili: Negative / Urobili: Negative   Blood: x / Protein: 30 mg/dL / Nitrite: Negative   Leuk Esterase: Small / RBC: >50 /HPF / WBC 11-25 /HPF   Sq Epi: x / Non Sq Epi: Few / Bacteria: Few    COVID-19 PCR: Detected (23 @ 15:20)    ( @ 15:53)  [facility]    Radiology: all available radiological tests reviewed    < from: CT Angio Chest PE Protocol w/ IV Cont (23 @ 16:57) >  No evidence of central, lobar, or segmental pulmonary embolism.  Cardiomegaly with a small-moderate circumferential pericardial effusion.   Correlation with echocardiogram is suggested.  Moderate right and small left pleural effusions.  < end of copied text >    < from: Xray Chest 1 View-PORTABLE IMMEDIATE (Xray Chest 1 View-PORTABLE IMMEDIATE .) (23 @ 09:29) >  IMPRESSION: Increase in mild right base pleural pulmonary process.  < end of copied text >      Advanced directives addressed: full resuscitation

## 2023-03-04 NOTE — PROGRESS NOTE ADULT - PROBLEM SELECTOR PLAN 1
Rate suboptimally controlled (tachycardic with exertion; occasional pauses) and in the setting of moderate LV systolic dysfunction; converted to sinus rhythm on 3/1/23.  EP input appreciated.  Metoprolol decreased to 25mg BID.  Started on amiodarone 400mg BID for 2 weeks then decrease to 400mg daily.  Plan is for ILR placement Monday for long term monitoring of afib and PVC burden.     s/p LHC  on 3/2 revealing non obstructive CAD - results as above - continue eliquis 5 mg po q12hrs, cont, BB, no ACEI/ARB due to labile kidney function, continue lipitor 40 mg po HS, Rate suboptimally controlled (tachycardic with exertion; occasional pauses) and in the setting of moderate LV systolic dysfunction; converted to sinus rhythm on 3/1/23.  EP input appreciated.  Metoprolol decreased to 25mg BID.  On amiodarone load as per EP( 400mg BID for 2 weeks then decrease to 400mg daily).  Plan is for ILR placement Monday for long term monitoring of afib and PVC burden.     s/p LHC  on 3/2 revealing non obstructive CAD - results as above - continue eliquis 5 mg po q12hrs, cont, BB, no ACEI/ARB due to labile kidney function, continue lipitor 40 mg po HS,

## 2023-03-04 NOTE — PROVIDER CONTACT NOTE (OTHER) - ASSESSMENT
VSS, /84, O2 100% on 3L NC, no complaints of pain or discomfort at this time.
Pt is asymptomatic with no complaints of dizziness/lightheadedness, SOB, chest pain/palpitations
VSS no complaints of CP at this time

## 2023-03-04 NOTE — PROGRESS NOTE ADULT - PROBLEM SELECTOR PLAN 3
Asymptomatic -- very frequent PVCs and NSVT; EP input appreciated.  See above Asymptomatic -- very frequent PVCs and NSVT; EP input appreciated.  See above    Will sign off at this time

## 2023-03-05 DIAGNOSIS — U07.1 COVID-19: ICD-10-CM

## 2023-03-05 LAB
ANION GAP SERPL CALC-SCNC: 5 MMOL/L — SIGNIFICANT CHANGE UP (ref 5–17)
BUN SERPL-MCNC: 61 MG/DL — HIGH (ref 7–23)
CALCIUM SERPL-MCNC: 8.9 MG/DL — SIGNIFICANT CHANGE UP (ref 8.5–10.1)
CHLORIDE SERPL-SCNC: 107 MMOL/L — SIGNIFICANT CHANGE UP (ref 96–108)
CO2 SERPL-SCNC: 27 MMOL/L — SIGNIFICANT CHANGE UP (ref 22–31)
CREAT SERPL-MCNC: 1.17 MG/DL — SIGNIFICANT CHANGE UP (ref 0.5–1.3)
EGFR: 60 ML/MIN/1.73M2 — SIGNIFICANT CHANGE UP
GLUCOSE SERPL-MCNC: 127 MG/DL — HIGH (ref 70–99)
MAGNESIUM SERPL-MCNC: 2.2 MG/DL — SIGNIFICANT CHANGE UP (ref 1.6–2.6)
POTASSIUM SERPL-MCNC: 4 MMOL/L — SIGNIFICANT CHANGE UP (ref 3.5–5.3)
POTASSIUM SERPL-SCNC: 4 MMOL/L — SIGNIFICANT CHANGE UP (ref 3.5–5.3)
SARS-COV-2 RNA SPEC QL NAA+PROBE: DETECTED
SODIUM SERPL-SCNC: 139 MMOL/L — SIGNIFICANT CHANGE UP (ref 135–145)

## 2023-03-05 PROCEDURE — 99233 SBSQ HOSP IP/OBS HIGH 50: CPT

## 2023-03-05 RX ADMIN — Medication 1 DROP(S): at 05:15

## 2023-03-05 RX ADMIN — Medication 81 MILLIGRAM(S): at 10:52

## 2023-03-05 RX ADMIN — AMIODARONE HYDROCHLORIDE 400 MILLIGRAM(S): 400 TABLET ORAL at 21:24

## 2023-03-05 RX ADMIN — ATORVASTATIN CALCIUM 40 MILLIGRAM(S): 80 TABLET, FILM COATED ORAL at 21:25

## 2023-03-05 RX ADMIN — APIXABAN 5 MILLIGRAM(S): 2.5 TABLET, FILM COATED ORAL at 10:52

## 2023-03-05 RX ADMIN — Medication 5 MILLIGRAM(S): at 21:24

## 2023-03-05 RX ADMIN — Medication 25 MILLIGRAM(S): at 10:53

## 2023-03-05 RX ADMIN — APIXABAN 5 MILLIGRAM(S): 2.5 TABLET, FILM COATED ORAL at 21:24

## 2023-03-05 RX ADMIN — Medication 40 MILLIGRAM(S): at 10:52

## 2023-03-05 RX ADMIN — TAMSULOSIN HYDROCHLORIDE 0.8 MILLIGRAM(S): 0.4 CAPSULE ORAL at 21:26

## 2023-03-05 RX ADMIN — AMIODARONE HYDROCHLORIDE 400 MILLIGRAM(S): 400 TABLET ORAL at 10:53

## 2023-03-05 RX ADMIN — REMDESIVIR 200 MILLIGRAM(S): 5 INJECTION INTRAVENOUS at 18:15

## 2023-03-05 RX ADMIN — Medication 20 MILLIEQUIVALENT(S): at 10:53

## 2023-03-05 RX ADMIN — Medication 25 MILLIGRAM(S): at 21:24

## 2023-03-05 RX ADMIN — Medication 1 DROP(S): at 18:16

## 2023-03-05 RX ADMIN — Medication 6 MILLIGRAM(S): at 10:52

## 2023-03-05 RX ADMIN — Medication 1 TABLET(S): at 10:51

## 2023-03-05 RX ADMIN — LATANOPROST 1 DROP(S): 0.05 SOLUTION/ DROPS OPHTHALMIC; TOPICAL at 21:30

## 2023-03-05 NOTE — PROGRESS NOTE ADULT - SUBJECTIVE AND OBJECTIVE BOX
Date of service: 23 @ 15:21    OOB to chair  Has dry cough  No SOB at rest    ROS: no fever or chills; denies dizziness, no HA, no abdominal pain, no diarrhea or constipation; no dysuria, no legs pain, no rashes    MEDICATIONS  (STANDING):  aMIOdarone    Tablet 400 milliGRAM(s) Oral two times a day  apixaban 5 milliGRAM(s) Oral every 12 hours  aspirin enteric coated 81 milliGRAM(s) Oral daily  atorvastatin 40 milliGRAM(s) Oral at bedtime  dexAMETHasone     Tablet 6 milliGRAM(s) Oral daily  furosemide    Tablet 40 milliGRAM(s) Oral daily  latanoprost 0.005% Ophthalmic Solution 1 Drop(s) Both EYES at bedtime  melatonin 5 milliGRAM(s) Oral at bedtime  metoprolol tartrate 25 milliGRAM(s) Oral two times a day  multivitamin 1 Tablet(s) Oral daily  potassium chloride    Tablet ER 20 milliEquivalent(s) Oral daily  remdesivir  IVPB 100 milliGRAM(s) IV Intermittent every 24 hours  remdesivir  IVPB   IV Intermittent   tamsulosin 0.8 milliGRAM(s) Oral at bedtime  timolol 0.5% Solution 1 Drop(s) Both EYES two times a day    Vital Signs Last 24 Hrs  T(C): 36.2 (05 Mar 2023 07:53), Max: 36.3 (04 Mar 2023 21:49)  T(F): 97.1 (05 Mar 2023 07:53), Max: 97.3 (04 Mar 2023 21:49)  HR: 67 (05 Mar 2023 07:53) (67 - 67)  BP: 125/95 (05 Mar 2023 07:53) (125/95 - 132/75)  BP(mean): --  RR: 17 (05 Mar 2023 07:53) (17 - 18)  SpO2: 97% (05 Mar 2023 07:53) (97% - 98%)    Parameters below as of 05 Mar 2023 07:53  Patient On (Oxygen Delivery Method): room air     Physical exam:    Constitutional:  No acute distress  HEENT: NC/AT, EOMI, PERRLA, conjunctivae clear; ears and nose atraumatic  Neck: supple; thyroid not palpable  Back: no tenderness  Respiratory: respiratory effort normal; few crackles at bases  Cardiovascular: S1S2 regular, no murmurs  Abdomen: soft, not tender, not distended, positive BS  Genitourinary: no suprapubic tenderness  Lymphatic: no LN palpable  Musculoskeletal: no muscle tenderness, no joint swelling or tenderness  Extremities: no pedal edema  Neurological/ Psychiatric: AxOx3, moving all extremities  Skin: no rashes; no palpable lesions    Labs: reviewed        139  |  107  |  61<H>  ----------------------------<  127<H>  4.0   |  27  |  1.17    Ca    8.9      05 Mar 2023 06:58  Mg     2.2     03-05    TPro  6.2  /  Alb  3.1<L>  /  TBili  0.8  /  DBili  0.4<H>  /  AST  28  /  ALT  50  /  AlkPhos  80  03-05    D-Dimer Assay, Quantitative: 1163 ng/mL DDU (23 @ 15:20)      03-    138  |  105  |  70<H>  ----------------------------<  183<H>  3.8   |  26  |  1.35<H>    Ca    8.8      04 Mar 2023 06:54  Mg     2.2     03-04    TPro  6.2  /  Alb  3.0<L>  /  TBili  0.8  /  DBili  0.3  /  AST  25  /  ALT  39  /  AlkPhos  76  03-04    D-Dimer Assay, Quantitative: 1163 ng/mL DDU (23 @ 15:20)                        11.7   6.74  )-----------( 137      ( 02 Mar 2023 06:49 )             37.1     03-03    138  |  105  |  71<H>  ----------------------------<  120<H>  3.5   |  29  |  1.34<H>    Ca    8.6      03 Mar 2023 07:39  Mg     2.2     03-03    TPro  6.2  /  Alb  3.0<L>  /  TBili  0.9  /  DBili  0.4<H>  /  AST  28  /  ALT  46  /  AlkPhos  81  03-03    D-Dimer Assay, Quantitative: 1163 ng/mL DDU (23 @ 15:20)                  143  |  108  |  69<H>  ----------------------------<  125<H>  4.2   |  28  |  1.48<H>    Ca    9.2      01 Mar 2023 06:27  Mg     2.5         TPro  6.5  /  Alb  3.4  /  TBili  1.2  /  DBili  x   /  AST  30  /  ALT  42  /  AlkPhos  85       LIVER FUNCTIONS - ( 2023 07:32 )  Alb: 3.4 g/dL / Pro: 6.5 gm/dL / ALK PHOS: 85 U/L / ALT: 42 U/L / AST: 30 U/L / GGT: x           Urinalysis Basic - ( 2023 18:40 )    Color: Yellow / Appearance: Clear / S.020 / pH: x  Gluc: x / Ketone: Negative  / Bili: Negative / Urobili: Negative   Blood: x / Protein: 30 mg/dL / Nitrite: Negative   Leuk Esterase: Small / RBC: >50 /HPF / WBC 11-25 /HPF   Sq Epi: x / Non Sq Epi: Few / Bacteria: Few    COVID-19 PCR: Detected (23 @ 15:20)    ( @ 15:53)  [facility]    Radiology: all available radiological tests reviewed    < from: CT Angio Chest PE Protocol w/ IV Cont (23 @ 16:57) >  No evidence of central, lobar, or segmental pulmonary embolism.  Cardiomegaly with a small-moderate circumferential pericardial effusion.   Correlation with echocardiogram is suggested.  Moderate right and small left pleural effusions.  < end of copied text >    < from: Xray Chest 1 View-PORTABLE IMMEDIATE (Xray Chest 1 View-PORTABLE IMMEDIATE .) (23 @ 09:29) >  IMPRESSION: Increase in mild right base pleural pulmonary process.  < end of copied text >      Advanced directives addressed: full resuscitation

## 2023-03-05 NOTE — PROGRESS NOTE ADULT - PROBLEM SELECTOR PROBLEM 6
Mitral regurgitation
Mitral regurgitation
Acute on chronic HFrEF (heart failure with reduced ejection fraction)

## 2023-03-05 NOTE — PROGRESS NOTE ADULT - PROBLEM SELECTOR PROBLEM 4
Atrial fibrillation
Pericardial effusion
Atrial fibrillation
Pericardial effusion
Pericardial effusion
Dyspnea on exertion
Pericardial effusion

## 2023-03-05 NOTE — PROGRESS NOTE ADULT - ASSESSMENT
89 year old man with HTN, BPH, arthritis, presented 2/24 with exertional dyspnea and B/L LE edema, found to have new onset afib with RVR and CHF. Admitted to Medicine.     Acute respiratory failure with hypoxia  Likely multifactorial due to decompensated CHF, pulmonary edema, B/L pleural effusions, atelectasis, pericardial effusion, and diagnosis of COVID19 made mid-admission. Improved with treatment of these issues. Weaned to room air today.   - Continue to manage underlying issues, see below    COVID19 infection, unable to rule out COVID19 pneumonia  Appreciate input from ID. Started patient on remdesivir and dexamethasone 3/1.   - Continue remdesivir, (3/1-), plan for 5-day course  - Continue dexamethasone (3/1-), plan for 10-day course  - Incentive spirometry  - Encourage mobility  - DVT px    New onset afib with RVR, PVCs, prolonged QTC  Converted to NSR 3/1 but with paroxysms of atrial fibrillation and periods of rapid response, significant PVC burden, prolonged QTC and infrequent wide complex tachycardia, likely NSVT. WCT appears to have resolved with amiodarone loading but still with PVCs, had PAT to 150 last night for ~10 sec. Appreciate input from Cardiology and Cardiac EP.   - Continue metoprolol. Amiodarone 400mg po BID for 2 weeks then plan to decrease to 400mg daily. Black box warning of Amiodarone discussed with patient. Will need out patient monitoring of LFT's, TSH, Opthalmology evaluations and PFTs.  - Continue long-term oral anticoagulation, on Eliquis  - ILR implantation, possibly on Monday as a long-term telemetry for AFib / PVC burden   - Maintain K ~4, Mg ~2  - TSH WNL  - Plan to repeat echo in 3 months  - Outpatient follow up with Dr. Carranza    Acute systolic CHF  BNP 11,157. LVEF 35-40% on TTE. Also noted to have Mod MR, mod to severe TR, pHTN. Appreciate input from Cardiology. L heart cath 3/2. Non-obstructive coronary artery disease with heavy calcification. LVEDP 21. Continued on diuretics, metoprolol. Unable to use ACEI/ARB/ARNI at the present due to CrCl. Volume status improving, now on room air, still with B/L LE edema. Cardiology has switched IV Lasix to PO.  - Continue furosemide, metoprolol  - Strict Is and Os, daily wt, fluid restriction    Pulmonary HTN  As noted on TTE. Cardiac cath done, elevated LVED noted.   - Continue management of CHF    HTN  BP improved.  - Continue to monitor    Pericardial effusion  Rather small in side. No sign of tamponade.   - Continue to monitor    Abnormal urinalysis  UA with negative nitrite, small LE, >50 RBC, 11-25 WBCs, few bact. No dysuria or gross hematuria. No suprapubic pain for flank pain. Urine culture negative.   - Monitoring off antibiotics.     BPH  Stable  - Continue tamsulosin    Physical deconditioning and debility  PT consulted, recommended [facility]BIE vs home with home PT.  Awaiting final PT recommendation.  - Continue restorative PT sessions        DVT px: On Eliquis for afib  Code status: Full code  Dispo: Anticipate patient to remain inpatient into Monday 3/6 for monitoring amiodarone load in setting of heavy PVC burden, prolonged QTc, wide QRS, also continued diuresis and supportive care measures. May be stable for DC Monday 3/6 after ILR.

## 2023-03-05 NOTE — PROGRESS NOTE ADULT - PROBLEM SELECTOR PROBLEM 5
Atrial fibrillation
Acute on chronic HFrEF (heart failure with reduced ejection fraction)
Acute on chronic HFrEF (heart failure with reduced ejection fraction)

## 2023-03-05 NOTE — PROGRESS NOTE ADULT - PROBLEM SELECTOR PROBLEM 1
Acute on chronic systolic congestive heart failure
Atrial fibrillation
Acute on chronic systolic congestive heart failure
Atrial fibrillation
Atrial fibrillation
Acute on chronic systolic congestive heart failure
Congestive heart failure (CHF)
Atrial fibrillation

## 2023-03-05 NOTE — PROGRESS NOTE ADULT - PROBLEM SELECTOR PROBLEM 2
2019 novel coronavirus disease (COVID-19)
Acute on chronic systolic congestive heart failure
Acute on chronic systolic congestive heart failure
Severe pulmonary hypertension
Acute on chronic systolic congestive heart failure
Severe pulmonary hypertension
Acute on chronic systolic congestive heart failure
Congestive heart failure (CHF)
Atrial fibrillation

## 2023-03-05 NOTE — PROGRESS NOTE ADULT - SUBJECTIVE AND OBJECTIVE BOX
Subjective:  awake and alert  no distress  on room air    MEDICATIONS  (STANDING):  aMIOdarone    Tablet 400 milliGRAM(s) Oral two times a day  apixaban 5 milliGRAM(s) Oral every 12 hours  aspirin enteric coated 81 milliGRAM(s) Oral daily  atorvastatin 40 milliGRAM(s) Oral at bedtime  dexAMETHasone     Tablet 6 milliGRAM(s) Oral daily  furosemide    Tablet 40 milliGRAM(s) Oral daily  latanoprost 0.005% Ophthalmic Solution 1 Drop(s) Both EYES at bedtime  melatonin 5 milliGRAM(s) Oral at bedtime  metoprolol tartrate 25 milliGRAM(s) Oral two times a day  multivitamin 1 Tablet(s) Oral daily  potassium chloride    Tablet ER 20 milliEquivalent(s) Oral daily  remdesivir  IVPB 100 milliGRAM(s) IV Intermittent every 24 hours  remdesivir  IVPB   IV Intermittent   tamsulosin 0.8 milliGRAM(s) Oral at bedtime  timolol 0.5% Solution 1 Drop(s) Both EYES two times a day    MEDICATIONS  (PRN):  diclofenac 75 milliGRAM(s) Oral two times a day PRN Moderate Pain (4 - 6)      Allergies    No Known Allergies    Intolerances        REVIEW OF SYSTEMS:    CONSTITUTIONAL:  As per HPI.  HEENT:  Eyes:  No diplopia or blurred vision. ENT:  No earache, sore throat or runny nose.  CARDIOVASCULAR:  No pressure, squeezing, tightness, heaviness or aching about the chest, neck, axilla or epigastrium.  RESPIRATORY:  No cough, shortness of breath, PND or orthopnea.  GASTROINTESTINAL:  No nausea, vomiting or diarrhea.  GENITOURINARY:  No dysuria, frequency or urgency.  MUSCULOSKELETAL:  no joint pain, deformity, tenderness  EXTREMITIES: no clubbing cyanosis,edema  SKIN:  No change in skin, hair or nails.  NEUROLOGIC:  No paresthesias, fasciculations, seizures or weakness.  PSYCHIATRIC:  No disorder of thought or mood.  ENDOCRINE:  No heat or cold intolerance, polyuria or polydipsia.  HEMATOLOGICAL:  No easy bruising or bleedings:    Vital Signs Last 24 Hrs  T(C): 36.2 (05 Mar 2023 07:53), Max: 36.3 (04 Mar 2023 21:49)  T(F): 97.1 (05 Mar 2023 07:53), Max: 97.3 (04 Mar 2023 21:49)  HR: 67 (05 Mar 2023 07:53) (67 - 67)  BP: 125/95 (05 Mar 2023 07:53) (125/95 - 132/75)  BP(mean): --  RR: 17 (05 Mar 2023 07:53) (17 - 18)  SpO2: 97% (05 Mar 2023 07:53) (97% - 98%)    Parameters below as of 05 Mar 2023 07:53  Patient On (Oxygen Delivery Method): room air        PHYSICAL EXAMINATION:  SKIN: no rashes  HEAD: NC/AT  EYES: PERRLA, EOMI  NECK:  Supple. No lymphadenopathy. Jugular venous pressure not elevated. Carotids equal.   HEART:   S1S2 irreg  CHEST:  bilat ronchi; decreased bs bases R>L  ABDOMEN:  Soft and nontender.   EXTREMITIES:  no C/C/E  NEURO: AAO x 3, no focal deficts       LABS:    03-05    139  |  107  |  61<H>  ----------------------------<  127<H>  4.0   |  27  |  1.17    Ca    8.9      05 Mar 2023 06:58  Mg     2.2     03-05    TPro  6.2  /  Alb  3.1<L>  /  TBili  0.8  /  DBili  0.4<H>  /  AST  28  /  ALT  50  /  AlkPhos  80  03-05          RADIOLOGY & ADDITIONAL TESTS:

## 2023-03-05 NOTE — PROGRESS NOTE ADULT - ASSESSMENT
88 y/o male with h/o CHF, HTN on lisinopril/HTZ, BPH was admitted on 2/24 for increasing dyspnea on exertion, dizziness and new onset A.fib. Pt reported dyspnea over the last 2 weeks PTA. He has dyspnea at rest and has noticed increased leg swelling, as noted by his daughters here at bedside in the ED. His initial COVID test was negative, but then on 2/27 he was noted COVID PCR detected. He is SOB on supplemental O2 therapy.     1. Acute respiratory failure resolving. COVID-19 viral syndrome. Multifocal pneumonia. CHF exacerbation. CRF stage 3.  -respiratory improving  -cultures noted  -on remdesivir protocol # 5  -tolerating abx well so far; no side effects noted  -O2 therapy prn  -steroids  -AC  -droplet isolation  -respiratory care  -complete antiviral therapy  -monitor temps  -f/u CBC  -supportive care  2. Other issues:   -care per medicine

## 2023-03-05 NOTE — PROGRESS NOTE ADULT - SUBJECTIVE AND OBJECTIVE BOX
HOSPITALIST ATTENDING PROGRESS NOTE    Chart and meds reviewed.  Patient seen and examined.    CC: Afib, CHF    Subjective: Denies CP, SOB, cough.  On RA.    All other systems reviewed and found to be negative with the exception of what has been described above.    MEDICATIONS  (STANDING):  aMIOdarone    Tablet 400 milliGRAM(s) Oral two times a day  apixaban 5 milliGRAM(s) Oral every 12 hours  aspirin enteric coated 81 milliGRAM(s) Oral daily  atorvastatin 40 milliGRAM(s) Oral at bedtime  dexAMETHasone     Tablet 6 milliGRAM(s) Oral daily  furosemide    Tablet 40 milliGRAM(s) Oral daily  latanoprost 0.005% Ophthalmic Solution 1 Drop(s) Both EYES at bedtime  melatonin 5 milliGRAM(s) Oral at bedtime  metoprolol tartrate 25 milliGRAM(s) Oral two times a day  multivitamin 1 Tablet(s) Oral daily  potassium chloride    Tablet ER 20 milliEquivalent(s) Oral daily  tamsulosin 0.8 milliGRAM(s) Oral at bedtime  timolol 0.5% Solution 1 Drop(s) Both EYES two times a day    MEDICATIONS  (PRN):  diclofenac 75 milliGRAM(s) Oral two times a day PRN Moderate Pain (4 - 6)      VITALS:  T(F): 97.8 (03-05-23 @ 20:30), Max: 97.8 (03-05-23 @ 20:30)  HR: 74 (03-05-23 @ 20:30) (67 - 74)  BP: 153/93 (03-05-23 @ 20:30) (125/95 - 153/93)  RR: 17 (03-05-23 @ 20:30) (17 - 18)  SpO2: 97% (03-05-23 @ 20:30) (97% - 98%)  Wt(kg): --    I&O's Summary      CAPILLARY BLOOD GLUCOSE          PHYSICAL EXAM:  Gen: NAD  HEENT:  pupils equal and reactive, EOMI, no oropharyngeal lesions, erythema, exudates, oral thrush  NECK:   supple, no carotid bruits, no palpable lymph nodes, no thyromegaly  CV:  +S1, +S2, regular, no murmurs or rubs  RESP:   lungs clear to auscultation bilaterally, no wheezing, rales, rhonchi, good air entry bilaterally  BREAST:  not examined  GI:  abdomen soft, non-tender, non-distended, normal BS, no bruits, no abdominal masses, no palpable masses  RECTAL:  not examined  :  not examined  MSK:   normal muscle tone, no atrophy, no rigidity, no contractions  EXT:  no clubbing, no cyanosis, no edema, no calf pain, swelling or erythema  VASCULAR:  pulses equal and symmetric in the upper and lower extremities  NEURO:  AAOX3, no focal neurological deficits, follows all commands, able to move extremities spontaneously  SKIN:  no ulcers, lesions or rashes    LABS:        03-05    139  |  107  |  61<H>  ----------------------------<  127<H>  4.0   |  27  |  1.17    Ca    8.9      05 Mar 2023 06:58  Mg     2.2     03-05    TPro  6.2  /  Alb  3.1<L>  /  TBili  0.8  /  DBili  0.4<H>  /  AST  28  /  ALT  50  /  AlkPhos  80  03-05        LIVER FUNCTIONS - ( 05 Mar 2023 06:58 )  Alb: 3.1 g/dL / Pro: 6.2 gm/dL / ALK PHOS: 80 U/L / ALT: 50 U/L / AST: 28 U/L / GGT: x           CULTURES:  COVID19 PCR 2/27: Positive   Resp pathogens PCR 2/24: Negative  COVID19 PCR 2/24: Negative    Imaging:  CXR 3/1: Gross heart enlargement again noted. There are slightly increasing right base pleural pulmonary process compared to February 24. Otherwise no change.    CTA chest W/ 2/24: There are no filling defects in the main pulmonary artery or its lobar and segmental branches. Cardiomegaly. There is a small-moderate circumferential pericardial effusion. The great vessels are normal in size. Moderate right and small left pleural effusions. There is mild groundglass opacity in the right upper lobe, likely secondary to pulmonary edema. Mild consolidation in the right lower lobe, likely atelectasis. The central airways are patent. No thoracic adenopathy. Fluid attenuating nodule in the left adrenal gland, possibly an adrenal adenoma. Perihepatic ascites. No aggressive osseous lesions.    CXR 2/24: Moderate enlargement of cardiac silhouette. Left lung is clear. Small right effusion/consolidation.    Cardiac Testing:  Tele 3/5/23: sinus, 1st degree AVB, PVCs, burst PAT, bigem, trigem  Tele 3/4: SR, 60s-80s, PVCs, episode of PAT to 150 bpm last night lasting 9.56 sec    Tele 3/3: SR 60-80's PVC, Ventricular bigeminy    Cardiac Cath 3/2: Non-obstructive coronary artery disease with heavy calcification. LVEDP 21.    EKG 3/2: Rate 75. SR with PVCs, OK 170ms, QRS 186ms, QT 470ms, QTC 524ms (corrected for RBBB 438ms)    Tele 3/2: Converted to NSR but with paroxysms of afib, RVR this AM, though HR now better controlled, 70s. Still lots of ectopy.     Tele 3/1: Afib, rates 80s-100s, PVCs, 11 sec of WCT likely NSVT    TTE 2/25:  The mitral valve leaflets appear thickened. Moderate (2+) mitral regurgitation present. Mild aortic sclerosis is present with normal valvular opening. Trace aortic regurgitation is present. Moderate to severe (3+) tricuspid valve regurgitation is present. Severe pulmonary hypertension. The left atrium is mildly dilated. Left ventricle systolic function appears impaired; segmental wall motion abnormalities noted. Estimated Ejection Fraction is 35- 40%. Mild concentric left ventricular hypertrophy is present. The IVC is dilated with decreased respiratory variation.    EKG 2/24: Rate 106. Atrial fibrillation with rapid ventricular response with premature ventricular or aberrantly conducted complexes. Right bundle branch block.

## 2023-03-05 NOTE — PROGRESS NOTE ADULT - ASSESSMENT
- cont O2  - patient has severe pulmonary HTN (RVSP 60) in setting of HFrEF and mitral regurg  - has right pleural effussion  - pulmonary HTN most likely due to left heart failure  - s/p cardiac cath - LVEF 30-35%  - optimize heart failure treatment  - repeat cxr in am  - day 5 remdesivir/decadron

## 2023-03-06 PROBLEM — I10 ESSENTIAL (PRIMARY) HYPERTENSION: Chronic | Status: ACTIVE | Noted: 2023-02-24

## 2023-03-06 PROBLEM — Z87.438 PERSONAL HISTORY OF OTHER DISEASES OF MALE GENITAL ORGANS: Chronic | Status: ACTIVE | Noted: 2023-02-25

## 2023-03-06 LAB
ALBUMIN SERPL ELPH-MCNC: 3.1 G/DL — LOW (ref 3.3–5)
ALP SERPL-CCNC: 74 U/L — SIGNIFICANT CHANGE UP (ref 40–120)
ALT FLD-CCNC: 46 U/L — SIGNIFICANT CHANGE UP (ref 12–78)
AST SERPL-CCNC: 24 U/L — SIGNIFICANT CHANGE UP (ref 15–37)
BILIRUB DIRECT SERPL-MCNC: 0.4 MG/DL — HIGH (ref 0–0.3)
BILIRUB INDIRECT FLD-MCNC: 0.6 MG/DL — SIGNIFICANT CHANGE UP (ref 0.2–1)
BILIRUB SERPL-MCNC: 1 MG/DL — SIGNIFICANT CHANGE UP (ref 0.2–1.2)
CREAT SERPL-MCNC: 1.07 MG/DL — SIGNIFICANT CHANGE UP (ref 0.5–1.3)
EGFR: 66 ML/MIN/1.73M2 — SIGNIFICANT CHANGE UP
PROT SERPL-MCNC: 6.2 GM/DL — SIGNIFICANT CHANGE UP (ref 6–8.3)

## 2023-03-06 PROCEDURE — 99232 SBSQ HOSP IP/OBS MODERATE 35: CPT | Mod: 25

## 2023-03-06 PROCEDURE — 33285 INSJ SUBQ CAR RHYTHM MNTR: CPT

## 2023-03-06 PROCEDURE — 99232 SBSQ HOSP IP/OBS MODERATE 35: CPT

## 2023-03-06 RX ADMIN — AMIODARONE HYDROCHLORIDE 400 MILLIGRAM(S): 400 TABLET ORAL at 10:55

## 2023-03-06 RX ADMIN — APIXABAN 5 MILLIGRAM(S): 2.5 TABLET, FILM COATED ORAL at 21:04

## 2023-03-06 RX ADMIN — Medication 6 MILLIGRAM(S): at 10:55

## 2023-03-06 RX ADMIN — Medication 5 MILLIGRAM(S): at 21:04

## 2023-03-06 RX ADMIN — Medication 25 MILLIGRAM(S): at 21:05

## 2023-03-06 RX ADMIN — Medication 20 MILLIEQUIVALENT(S): at 12:17

## 2023-03-06 RX ADMIN — ATORVASTATIN CALCIUM 40 MILLIGRAM(S): 80 TABLET, FILM COATED ORAL at 21:04

## 2023-03-06 RX ADMIN — LATANOPROST 1 DROP(S): 0.05 SOLUTION/ DROPS OPHTHALMIC; TOPICAL at 21:04

## 2023-03-06 RX ADMIN — Medication 25 MILLIGRAM(S): at 10:56

## 2023-03-06 RX ADMIN — TAMSULOSIN HYDROCHLORIDE 0.8 MILLIGRAM(S): 0.4 CAPSULE ORAL at 21:04

## 2023-03-06 RX ADMIN — Medication 81 MILLIGRAM(S): at 10:56

## 2023-03-06 RX ADMIN — AMIODARONE HYDROCHLORIDE 400 MILLIGRAM(S): 400 TABLET ORAL at 21:04

## 2023-03-06 RX ADMIN — Medication 1 DROP(S): at 06:21

## 2023-03-06 RX ADMIN — Medication 1 DROP(S): at 17:49

## 2023-03-06 RX ADMIN — Medication 1 TABLET(S): at 10:55

## 2023-03-06 RX ADMIN — Medication 40 MILLIGRAM(S): at 10:56

## 2023-03-06 RX ADMIN — APIXABAN 5 MILLIGRAM(S): 2.5 TABLET, FILM COATED ORAL at 10:55

## 2023-03-06 NOTE — PROGRESS NOTE ADULT - SUBJECTIVE AND OBJECTIVE BOX
HPI: 89 yo male w/ PMHx of CHF, HTN on lisinopril/HTZ,  BPH  who was sent by his doctor, Dr. Umanzor  to  [place] [facility]  with complaint of  increasing dyspnea on exertion, dizziness and new onset afib.   Pt reported dyspnea over the last 2 weeks.   He now also has dyspnea  at rest and has noticed increased leg swelling, as noted by his daughters here at bedside in the ED.   Pt has not been on lasix.     Pt denies cp, cough, no abd pain, no n/v/d, no night time orthopnea or PND,  no palpitations, no f/c, no resp or urinary complaints.    Pt found to have AF w/RVR/CHF, echo showed LVEF 35-40%, also diagnosed (+)COVID on remdesivir& steroids.  s/p LHC - non obstructive CAD      Echo (2/25/23) LVEF 35-40%, 2+MR, 3+ TR, sev pul HTN  tele:  episode of Aflutter 140's bpm for 1 hr at 6AM, then remains in SR 60-70's bpm, frequent PVCs, couplets  EKG (2/24/23) Afib 106 bpm with PVC, QRS 172ms, RBBB  EKG (3/2/23)  SR 75bpm with PVC, NY 170ms, QRS 186ms, QT 470ms, QTC 524ms (corrected for RBBB 438ms)     3/3/23: Pt is OOB c/o ' feeling tired', denies chest pain/SOB/palpitations  tele: SR 60-80's PVC, Ventricular bigeminy    3/6/23: pt is OOB, denies chest pain/SOB/palpitations in RA.  tele: SR 50-70's PVC      ROS: All other ROS is negative unless indicated above.      Physical Exam:  Vital Signs Last 24 Hrs  T(C): 36.2 (06 Mar 2023 07:29), Max: 36.6 (05 Mar 2023 20:30)  T(F): 97.1 (06 Mar 2023 07:29), Max: 97.8 (05 Mar 2023 20:30)  HR: 74 (06 Mar 2023 07:29) (74 - 74)  BP: 141/90 (06 Mar 2023 07:29) (141/90 - 153/93)  RR: 18 (06 Mar 2023 07:29) (17 - 18)  SpO2: 99% (06 Mar 2023 07:29) (97% - 99%)    Parameters below as of 06 Mar 2023 07:29  Patient On (Oxygen Delivery Method): room air      Constitutional: well developed,  no deformities and no acute distress    Neurological: Alert & Oriented x 3, CARRILLO, no focal deficits    HEENT: NC/AT, PERRLA, EOMI,  Neck supple.    Respiratory: mildly diminished in bases    Cardiovascular: (+) S1 & S2, RRR,    Gastrointestinal: soft, NT, nondistended, (+) BS    Genitourinary: non distended bladder, voiding freely    Extremities: (+)B/L pedal edema      Allergies    No Known Allergies    Intolerances      MEDICATIONS  (STANDING):  aMIOdarone    Tablet 400 milliGRAM(s) Oral two times a day  apixaban 5 milliGRAM(s) Oral every 12 hours  aspirin enteric coated 81 milliGRAM(s) Oral daily  atorvastatin 40 milliGRAM(s) Oral at bedtime  dexAMETHasone     Tablet 6 milliGRAM(s) Oral daily  furosemide    Tablet 40 milliGRAM(s) Oral daily  latanoprost 0.005% Ophthalmic Solution 1 Drop(s) Both EYES at bedtime  melatonin 5 milliGRAM(s) Oral at bedtime  metoprolol tartrate 25 milliGRAM(s) Oral two times a day  multivitamin 1 Tablet(s) Oral daily  potassium chloride    Tablet ER 20 milliEquivalent(s) Oral daily  tamsulosin 0.8 milliGRAM(s) Oral at bedtime  timolol 0.5% Solution 1 Drop(s) Both EYES two times a day    MEDICATIONS  (PRN):  diclofenac 75 milliGRAM(s) Oral two times a day PRN Moderate Pain (4 - 6)    LABS:    03-06    x   |  x   |  x   ----------------------------<  x   x    |  x   |  1.07    Ca    8.9      05 Mar 2023 06:58  Mg     2.2     03-05    TPro  6.2  /  Alb  3.1<L>  /  TBili  1.0  /  DBili  0.4<H>  /  AST  24  /  ALT  46  /  AlkPhos  74  03-06

## 2023-03-06 NOTE — PROGRESS NOTE ADULT - SUBJECTIVE AND OBJECTIVE BOX
HOSPITALIST ATTENDING PROGRESS NOTE    Chart and meds reviewed.  Patient seen and examined.    CC: CHF, afib    Subjective: Denies CP, SOB, palpitations. S/p ILR placement today.     All other systems reviewed and found to be negative with the exception of what has been described above.    MEDICATIONS  (STANDING):  aMIOdarone    Tablet 400 milliGRAM(s) Oral two times a day  apixaban 5 milliGRAM(s) Oral every 12 hours  aspirin enteric coated 81 milliGRAM(s) Oral daily  atorvastatin 40 milliGRAM(s) Oral at bedtime  dexAMETHasone     Tablet 6 milliGRAM(s) Oral daily  furosemide    Tablet 40 milliGRAM(s) Oral daily  latanoprost 0.005% Ophthalmic Solution 1 Drop(s) Both EYES at bedtime  melatonin 5 milliGRAM(s) Oral at bedtime  metoprolol tartrate 25 milliGRAM(s) Oral two times a day  multivitamin 1 Tablet(s) Oral daily  potassium chloride    Tablet ER 20 milliEquivalent(s) Oral daily  tamsulosin 0.8 milliGRAM(s) Oral at bedtime  timolol 0.5% Solution 1 Drop(s) Both EYES two times a day    MEDICATIONS  (PRN):  diclofenac 75 milliGRAM(s) Oral two times a day PRN Moderate Pain (4 - 6)      VITALS:  T(F): 98.6 (03-06-23 @ 13:59), Max: 98.6 (03-06-23 @ 13:59)  HR: 61 (03-06-23 @ 13:59) (60 - 74)  BP: 123/64 (03-06-23 @ 13:59) (123/64 - 153/93)  RR: 17 (03-06-23 @ 13:59) (17 - 18)  SpO2: 96% (03-06-23 @ 13:59) (96% - 100%)  Wt(kg): --    I&O's Summary    05 Mar 2023 07:01  -  06 Mar 2023 07:00  --------------------------------------------------------  IN: 0 mL / OUT: 200 mL / NET: -200 mL        CAPILLARY BLOOD GLUCOSE          PHYSICAL EXAM:  Gen: NAD  HEENT:  pupils equal and reactive, EOMI, no oropharyngeal lesions, erythema, exudates, oral thrush  NECK:   supple, no carotid bruits, no palpable lymph nodes, no thyromegaly  CV:  +S1, +S2, regular, no murmurs or rubs  RESP:   lungs clear to auscultation bilaterally, no wheezing, rales, rhonchi, good air entry bilaterally  BREAST:  not examined  GI:  abdomen soft, non-tender, non-distended, normal BS, no bruits, no abdominal masses, no palpable masses  RECTAL:  not examined  :  not examined  MSK:   normal muscle tone, no atrophy, no rigidity, no contractions  EXT:  no clubbing, no cyanosis, no edema, no calf pain, swelling or erythema  VASCULAR:  pulses equal and symmetric in the upper and lower extremities  NEURO:  AAOX3, no focal neurological deficits, follows all commands, able to move extremities spontaneously  SKIN:  no ulcers, lesions or rashes    LABS:        03-06    x   |  x   |  x   ----------------------------<  x   x    |  x   |  1.07    Ca    8.9      05 Mar 2023 06:58  Mg     2.2     03-05    TPro  6.2  /  Alb  3.1<L>  /  TBili  1.0  /  DBili  0.4<H>  /  AST  24  /  ALT  46  /  AlkPhos  74  03-06        LIVER FUNCTIONS - ( 06 Mar 2023 06:19 )  Alb: 3.1 g/dL / Pro: 6.2 gm/dL / ALK PHOS: 74 U/L / ALT: 46 U/L / AST: 24 U/L / GGT: x           CULTURES:  COVID19 PCR 2/27: Positive   Resp pathogens PCR 2/24: Negative  COVID19 PCR 2/24: Negative    Imaging:  CXR 3/1: Gross heart enlargement again noted. There are slightly increasing right base pleural pulmonary process compared to February 24. Otherwise no change.    CTA chest W/ 2/24: There are no filling defects in the main pulmonary artery or its lobar and segmental branches. Cardiomegaly. There is a small-moderate circumferential pericardial effusion. The great vessels are normal in size. Moderate right and small left pleural effusions. There is mild groundglass opacity in the right upper lobe, likely secondary to pulmonary edema. Mild consolidation in the right lower lobe, likely atelectasis. The central airways are patent. No thoracic adenopathy. Fluid attenuating nodule in the left adrenal gland, possibly an adrenal adenoma. Perihepatic ascites. No aggressive osseous lesions.    CXR 2/24: Moderate enlargement of cardiac silhouette. Left lung is clear. Small right effusion/consolidation.    Cardiac Testing:  Tele 3/6/23: sinus, 50-80, 1st degree AVB, bigem, trigem  Tele 3/5/23: sinus, 1st degree AVB, PVCs, burst PAT, bigem, trigem  Tele 3/4: SR, 60s-80s, PVCs, episode of PAT to 150 bpm last night lasting 9.56 sec    Tele 3/3: SR 60-80's PVC, Ventricular bigeminy    Cardiac Cath 3/2: Non-obstructive coronary artery disease with heavy calcification. LVEDP 21.    EKG 3/2: Rate 75. SR with PVCs, OH 170ms, QRS 186ms, QT 470ms, QTC 524ms (corrected for RBBB 438ms)    Tele 3/2: Converted to NSR but with paroxysms of afib, RVR this AM, though HR now better controlled, 70s. Still lots of ectopy.     Tele 3/1: Afib, rates 80s-100s, PVCs, 11 sec of WCT likely NSVT    TTE 2/25:  The mitral valve leaflets appear thickened. Moderate (2+) mitral regurgitation present. Mild aortic sclerosis is present with normal valvular opening. Trace aortic regurgitation is present. Moderate to severe (3+) tricuspid valve regurgitation is present. Severe pulmonary hypertension. The left atrium is mildly dilated. Left ventricle systolic function appears impaired; segmental wall motion abnormalities noted. Estimated Ejection Fraction is 35- 40%. Mild concentric left ventricular hypertrophy is present. The IVC is dilated with decreased respiratory variation.    EKG 2/24: Rate 106. Atrial fibrillation with rapid ventricular response with premature ventricular or aberrantly conducted complexes. Right bundle branch block.
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
## 2023-03-06 NOTE — PACU DISCHARGE NOTE - COMMENTS
Patient A&Ox4, forgetful at times, VSS. Report given to Cindy HUIZAR on [facility]. Right Radial dressing clean dry intact, no s/s of hematoma or bleeding noted. Patient placed on tele monitor, patient on 1:1 observation, PCA at bedside. Patient safely transferred back to room.
Report given to Merna HUIZAR [facility]. Verified with Arina. [employer] tech that the patient is on cardiac monitor. Home monitor device paired and instruction given by James Serna, [employer] Rep. Transferred patient to floor with transport staff.

## 2023-03-06 NOTE — PROGRESS NOTE ADULT - ASSESSMENT
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  Also (+)COVID.  Pt converted to SR on 3/1 with paroxysmal episode of PAFlutter, frequent mutifocal PVCs on tele.  Pt had dizziness prior to admission but denies syncope, PO amiodarone started on 3/2, less PVC on tele.  - continue metoprolol to 25mg po BID  - amiodarone 400mg po BID for 2 weeks (started on 3/2)  then decrease to 400mg daily.   Black box warning of Amiodarone discussed with patient. Will need out patient monitoring of LFT's, TSH, Opthalmology evaluations and Pulmonary Function Tests.  - agree with longterm OAC  - recommend ILR implant on the day of discharge as a longterm telemetry for PAF /PVC burden, r/b/i/a discussed with pt & daughter, informed consent obtained.  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  Also (+)COVID.  Pt converted to SR on 3/1 with paroxysmal episode of PAFlutter, frequent mutifocal PVCs on tele.  Pt had dizziness prior to admission but denies syncope, PO amiodarone started on 3/2, less PVC on tele.  - continue metoprolol to 25mg po BID  - amiodarone 400mg po BID for 2 weeks (started on 3/2)  then decrease to 200mg daily.   Black box warning of Amiodarone discussed with patient. Will need out patient monitoring of LFT's, TSH, Opthalmology evaluations and Pulmonary Function Tests.  - agree with longterm OAC  - recommend ILR implant on the day of discharge as a longterm telemetry for PAF /PVC burden, r/b/i/a discussed with pt & daughter, informed consent obtained.  -GDMT for HFrEF  - maintain K+>4.0, Mg++>2.0,   - recommend repeat echo in 3months  Plan d/w pt/family//cardio/hospitalist

## 2023-03-06 NOTE — PROGRESS NOTE ADULT - ASSESSMENT
89 year old man with HTN, BPH, arthritis, presented 2/24 with exertional dyspnea and B/L LE edema, found to have new onset afib with RVR and CHF. Admitted to Medicine.     Acute respiratory failure with hypoxia  Likely multifactorial due to decompensated CHF, pulmonary edema, B/L pleural effusions, atelectasis, pericardial effusion, and diagnosis of COVID19 made mid-admission. Improved with treatment of these issues. Weaned to room air today.   - Continue to manage underlying issues, see below    COVID19 infection, unable to rule out COVID19 pneumonia  Appreciate input from ID. Started patient on remdesivir and dexamethasone 3/1.   - Completed remdesivir 5d course  - Continue dexamethasone (3/1-), plan for 10-day course  - Incentive spirometry  - Encourage mobility  - DVT px    New onset afib with RVR, PVCs, prolonged QTC  Converted to NSR 3/1 but with paroxysms of atrial fibrillation and periods of rapid response, significant PVC burden, prolonged QTC and infrequent wide complex tachycardia, likely NSVT. WCT appears to have resolved with amiodarone loading but still with PVCs, had PAT to 150 last night for ~10 sec. Appreciate input from Cardiology and Cardiac EP.   - Continue metoprolol. Amiodarone 400mg po BID for 2 weeks then plan to decrease to 400mg daily. Black box warning of Amiodarone discussed with patient. Will need out patient monitoring of LFT's, TSH, Opthalmology evaluations and PFTs.  - Continue long-term oral anticoagulation, on Eliquis  - ILR placed today for AFib / PVC burden   - Maintain K ~4, Mg ~2  - TSH WNL  - Plan to repeat echo in 3 months  - Outpatient follow up with Dr. Carranza    Acute systolic CHF  BNP 11,157. LVEF 35-40% on TTE. Also noted to have Mod MR, mod to severe TR, pHTN. Appreciate input from Cardiology. L heart cath 3/2. Non-obstructive coronary artery disease with heavy calcification. LVEDP 21. Continued on diuretics, metoprolol. Unable to use ACEI/ARB/ARNI at the present due to CrCl. Volume status improving, now on room air, still with B/L LE edema. Cardiology has switched IV Lasix to PO.  - Continue furosemide, metoprolol  - Strict Is and Os, daily wt, fluid restriction    Pulmonary HTN  As noted on TTE. Cardiac cath done, elevated LVED noted.   - Continue management of CHF    HTN  BP improved.  - Continue to monitor    Pericardial effusion  Rather small in side. No sign of tamponade.   - Continue to monitor    Abnormal urinalysis  UA with negative nitrite, small LE, >50 RBC, 11-25 WBCs, few bact. No dysuria or gross hematuria. No suprapubic pain for flank pain. Urine culture negative.   - Monitoring off antibiotics.     BPH  Stable  - Continue tamsulosin    Physical deconditioning and debility  PT consulted, recommended ROBBIE    DVT px: On Eliquis for afib  Code status: Full code    ILR placed, off 1:1, for ROBBIE at [facility] on 3/7.

## 2023-03-06 NOTE — PROGRESS NOTE ADULT - PROVIDER SPECIALTY LIST ADULT
Electrophysiology
Hospitalist
Infectious Disease
Infectious Disease
Cardiology
Hospitalist
Infectious Disease
Cardiology
Electrophysiology
Hospitalist
Hospitalist
Infectious Disease
Pulmonology
Cardiology
Pulmonology
Cardiology
Pulmonology
Cardiology

## 2023-03-07 ENCOUNTER — TRANSCRIPTION ENCOUNTER (OUTPATIENT)
Age: 88
End: 2023-03-07

## 2023-03-07 VITALS
RESPIRATION RATE: 18 BRPM | SYSTOLIC BLOOD PRESSURE: 135 MMHG | HEART RATE: 58 BPM | DIASTOLIC BLOOD PRESSURE: 69 MMHG | TEMPERATURE: 97 F | OXYGEN SATURATION: 100 %

## 2023-03-07 PROCEDURE — 99239 HOSP IP/OBS DSCHRG MGMT >30: CPT

## 2023-03-07 RX ORDER — LISINOPRIL/HYDROCHLOROTHIAZIDE 10-12.5 MG
1 TABLET ORAL
Qty: 0 | Refills: 0 | DISCHARGE

## 2023-03-07 RX ORDER — POTASSIUM CHLORIDE 20 MEQ
1 PACKET (EA) ORAL
Qty: 0 | Refills: 0 | DISCHARGE
Start: 2023-03-07

## 2023-03-07 RX ORDER — AMIODARONE HYDROCHLORIDE 400 MG/1
200 TABLET ORAL DAILY
Refills: 0 | Status: DISCONTINUED | OUTPATIENT
Start: 2023-03-07 | End: 2023-03-07

## 2023-03-07 RX ORDER — FUROSEMIDE 40 MG
1 TABLET ORAL
Qty: 0 | Refills: 0 | DISCHARGE
Start: 2023-03-07

## 2023-03-07 RX ORDER — AMIODARONE HYDROCHLORIDE 400 MG/1
1 TABLET ORAL
Qty: 0 | Refills: 0 | DISCHARGE
Start: 2023-03-07

## 2023-03-07 RX ORDER — APIXABAN 2.5 MG/1
1 TABLET, FILM COATED ORAL
Qty: 0 | Refills: 0 | DISCHARGE
Start: 2023-03-07

## 2023-03-07 RX ORDER — METOPROLOL TARTRATE 50 MG
12.5 TABLET ORAL DAILY
Refills: 0 | Status: DISCONTINUED | OUTPATIENT
Start: 2023-03-08 | End: 2023-03-07

## 2023-03-07 RX ORDER — ATORVASTATIN CALCIUM 80 MG/1
1 TABLET, FILM COATED ORAL
Qty: 0 | Refills: 0 | DISCHARGE
Start: 2023-03-07

## 2023-03-07 RX ORDER — DEXAMETHASONE 0.5 MG/5ML
1 ELIXIR ORAL
Qty: 3 | Refills: 0
Start: 2023-03-07 | End: 2023-03-09

## 2023-03-07 RX ORDER — METOPROLOL TARTRATE 50 MG
0.5 TABLET ORAL
Qty: 0 | Refills: 0 | DISCHARGE

## 2023-03-07 RX ADMIN — Medication 40 MILLIGRAM(S): at 10:06

## 2023-03-07 RX ADMIN — Medication 20 MILLIEQUIVALENT(S): at 10:06

## 2023-03-07 RX ADMIN — Medication 6 MILLIGRAM(S): at 10:08

## 2023-03-07 RX ADMIN — APIXABAN 5 MILLIGRAM(S): 2.5 TABLET, FILM COATED ORAL at 10:05

## 2023-03-07 RX ADMIN — Medication 1 TABLET(S): at 10:06

## 2023-03-07 RX ADMIN — Medication 1 DROP(S): at 05:57

## 2023-03-07 RX ADMIN — AMIODARONE HYDROCHLORIDE 200 MILLIGRAM(S): 400 TABLET ORAL at 10:06

## 2023-03-07 RX ADMIN — Medication 25 MILLIGRAM(S): at 10:07

## 2023-03-07 RX ADMIN — Medication 81 MILLIGRAM(S): at 10:06

## 2023-03-07 NOTE — DISCHARGE NOTE PROVIDER - NSDCCAREPROVSEEN_GEN_ALL_CORE_FT
Marshall Salcido (Pulmonary Medicine)  Annabelle Spears (Hospital Medicine)  Ceasar Castro (Cardiology)  Kalyan Alvarez (Cardiology)  Palla, Venugopal R (Cardiology)  Berry Che (Infectious Diseases)  Kana Espinoza (Hospital Medicine)  Eulalio Rivas (Interventional Cardiology)  Shahla Carranza (Cardiac Electrophysiology

## 2023-03-07 NOTE — DISCHARGE NOTE PROVIDER - NSDCMRMEDTOKEN_GEN_ALL_CORE_FT
aspirin 81 mg oral delayed release tablet: 1 tab(s) orally once a day  diclofenac sodium 75 mg oral delayed release tablet: 1 tab(s) orally 2 times a day  latanoprost 0.005% ophthalmic solution: 1 drop(s) to each affected eye once a day (in the evening)  lisinopril-hydrochlorothiazide 20 mg-12.5 mg oral tablet: 1 tab(s) orally once a day  Multiple Vitamins oral tablet: 1 tab(s) orally once a day  terazosin 10 mg oral capsule: 1 cap(s) orally once a day (at bedtime)  timolol hemihydrate 0.5% ophthalmic solution: 1 drop(s) to each affected eye 2 times a day   amiodarone 200 mg oral tablet: 1 tab(s) orally once a day  apixaban 5 mg oral tablet: 1 tab(s) orally every 12 hours  aspirin 81 mg oral delayed release tablet: 1 tab(s) orally once a day  atorvastatin 40 mg oral tablet: 1 tab(s) orally once a day (at bedtime)  dexamethasone 6 mg oral tablet: 1 tab(s) orally once a day  diclofenac sodium 75 mg oral delayed release tablet: 1 tab(s) orally 2 times a day  furosemide 40 mg oral tablet: 1 tab(s) orally once a day  latanoprost 0.005% ophthalmic solution: 1 drop(s) to each affected eye once a day (in the evening)  Metoprolol Succinate ER 25 mg oral tablet, extended release: 0.5 tab(s) orally once a day, hold for SBP &lt; 110, DBP &lt; 60, HR &lt; 55  Multiple Vitamins oral tablet: 1 tab(s) orally once a day  potassium chloride 20 mEq oral tablet, extended release: 1 tab(s) orally once a day  terazosin 10 mg oral capsule: 1 cap(s) orally once a day (at bedtime)  timolol hemihydrate 0.5% ophthalmic solution: 1 drop(s) to each affected eye 2 times a day

## 2023-03-07 NOTE — DISCHARGE NOTE PROVIDER - NSDCCPCAREPLAN_GEN_ALL_CORE_FT
PRINCIPAL DISCHARGE DIAGNOSIS  Diagnosis: Acute respiratory failure with hypoxia  Assessment and Plan of Treatment: You were treated in the hospital for shortness of breath with low oxygen levels (hypoxia) likely multifactorial due to decompensated congestive heart failure (CHF) and new-onset rapid atrial fibrillation, associated with pulmonary edema, pleural effusions, and pericardial effusion. You were also treated for a diagnosis of COVID-19 infection made mid-admission. Reassuringly, you have since improved with treatment of these issues. Weaned to room air. [facility] for continued restorative physical therapy sessions and other care as needed.      SECONDARY DISCHARGE DIAGNOSES  Diagnosis: New onset atrial fibrillation  Assessment and Plan of Treatment: New onset afib with RVR, PVCs, prolonged QTC  Converted to NSR 3/1 but with paroxysms of atrial fibrillation and periods of rapid response, significant PVC burden, prolonged QTC and infrequent wide complex tachycardia, likely NSVT. Patient was started on amiodarone load. WCT resolved. Still with PVCs. S/P ILR 3/6 for monitoring of afib and PVCs. Continue amiodarone, decreased to 200mg daily by Cardiac Electrophysiology. Continue amiodarone and metoprolol. Black box warning of amiodarone discussed with patient. Will need out patient monitoring of LFT's, TSH, Opthalmology evaluations and PFTs. Continue long-term oral anticoagulation, on Eliquis. Plan to repeat echo in 3 months. Outpatient follow up with Cardiac Electrophysiology Dr. Carranza.      Diagnosis: Acute systolic congestive heart failure  Assessment and Plan of Treatment: You were treated for congestive heart failure (CHF). On echocardiogram, you had notably reduced squeezing function of the heart. You received a cardiac cath on 3/2 to assess for occlusive coronary artery disease (CAD). You had some non-obstructive CAD but no need for angioplasty or stent. Cath findings were consistent with CHF. You were treated with intravenous diuretics and you have since improved. Breathing comfortably. Less swelling. Continue furosemide and metoprolol. Monitor your weight. Avoid added salt in diet. Follow up with [facility] as outpatient.    Diagnosis: Pulmonary hypertension  Assessment and Plan of Treatment: As noted on echocardiogram. Cardiac cath done, elevated left ventricular pressures noted, consistent with congestive heart failure (CHF). You have since received management of CHF. Continue to monitor as outpatient.    Diagnosis: Pericardial effusion  Assessment and Plan of Treatment: Small in size. No complications from this finding. Continue to monitor as outpatient.    Diagnosis: 2019 novel coronavirus disease (COVID-19)  Assessment and Plan of Treatment: You were found to be positive for COVID19 infection. You completed a 5-day course of the anti-viral COVID medication, remdesivir. You were also treated with a steroid, Please complete 10-day course of dexamethasone upon discharge (3 more days). Recommend incentive spirometry use and improving your mobility.    Diagnosis: Abnormal urinalysis  Assessment and Plan of Treatment: Urinalysis was notable for some microscopic hematuria, mild pyuria and few bacteria. However, urine culture was ultimately negative for bacteria. You were monitored off of antibiotics and did well. No need for treatment.    Diagnosis: BPH (benign prostatic hyperplasia)  Assessment and Plan of Treatment: Stable. Continue terazosin.    Diagnosis: Physical deconditioning  Assessment and Plan of Treatment: You were seen and evaluated by Physical Therapy. Recommended subacute rehab upon discharge. Continue restorative therapy sessions.

## 2023-03-07 NOTE — DISCHARGE NOTE PROVIDER - NSDCPNSUBOBJ_GEN_ALL_CORE
Chief Complaint: Shortness of breath with exertion, bilateral leg swelling    Interval Hx: Patient seen and examined. He is subjectively improved since admission. No longer dyspneic. Still has B/L LE edema but also improved. No other acute complaints. Bit bradycardic on tele. Had amiodarone dose reduced to 200mg daily by Cardiac EP. S/P ILR yesterday for monitoring of afib and PVC burden. Stable for discharge to [facility].    ROS: Multi system review is comprehensively negative x 10 systems except as above    Discharge Exam:  T(F): 97.2 (07 Mar 2023 07:25), Max: 98.6 (06 Mar 2023 13:59)  HR: 56 (07 Mar 2023 07:25) (56 - 69)  BP: 130/88 (07 Mar 2023 07:25) (123/64 - 139/92)  RR: 18 (07 Mar 2023 07:25) (17 - 18)  SpO2: 97% (07 Mar 2023 07:25) (96% - 100%) on room air  Gen: No acute distress  HEENT: NCAT PERRL EOMI MMM clear oropharynx  Neck: Supple, no LAD, no thyromegaly  Chest: Normal resp effort at rest, diminished breath sounds at bases B/L  CVS: s1 s2 normal, RRR  Abd: +BS, soft NT ND   Ext: +B/L LE edema, no tenderness, normal cap refill  Skin: Warm, dry  Mood: calm, pleasant  Neuro: Awake and alert, answers questions appropriately, follows commands, no gross deficits    Labs:                      11.7   6.74 )-----------( 137             37.1       138  |  105  |  70  ----------------------< 183  3.8   |   26   |  1.35    Ca 8.8   Mg 2.2    TPro  6.2  /  Alb  3.0  /  TBili  0.8  /  DBili  0.3  /  AST  25  /  ALT  39  /  AlkPhos  76      PT: 15.0 sec;   INR: 1.29 ratio      Troponin 90, 75   proBNP 11,157   Ddimer 1163    Urinalysis 2/28: Yellow, clear, ket neg, prot 30, N neg, LE small, RBC > 50, WBC 11-25, bact few    Micro:  COVID19 PCR 3/5: Negative  COVID19 PCR 2/27: Positive   Resp pathogens PCR 2/24: Negative  COVID19 PCR 2/24: Negative    Imaging:  CXR 3/1: Gross heart enlargement again noted. There are slightly increasing right base pleural pulmonary process compared to February 24. Otherwise no change.    CTA chest W/ 2/24: There are no filling defects in the main pulmonary artery or its lobar and segmental branches. Cardiomegaly. There is a small-moderate circumferential pericardial effusion. The great vessels are normal in size. Moderate right and small left pleural effusions. There is mild groundglass opacity in the right upper lobe, likely secondary to pulmonary edema. Mild consolidation in the right lower lobe, likely atelectasis. The central airways are patent. No thoracic adenopathy. Fluid attenuating nodule in the left adrenal gland, possibly an adrenal adenoma. Perihepatic ascites. No aggressive osseous lesions.    CXR 2/24: Moderate enlargement of cardiac silhouette. Left lung is clear. Small right effusion/consolidation.    Cardiac Testing/Procedures:  ILR placement 3/6: Tolerated procedure well    Tele 3/5: SR, 50-80, 1st degree AVB, bigem, trigem    Tele 3/4: SR, 60s-80s, PVCs, episode of PAT to 150 bpm last night lasting 9.56 sec    Tele 3/3: SR 60-80's PVC, Ventricular bigeminy    Cardiac Cath 3/2: Non-obstructive coronary artery disease with heavy calcification. LVEDP 21.    EKG 3/2: Rate 75. SR with PVCs, TN 170ms, QRS 186ms, QT 470ms, QTC 524ms (corrected for RBBB 438ms)    Tele 3/2: Converted to NSR but with paroxysms of afib, RVR this AM, though HR now better controlled, 70s. Still lots of ectopy.     Tele 3/1: Afib, rates 80s-100s, PVCs, 11 sec of WCT likely NSVT    TTE 2/25:  The mitral valve leaflets appear thickened. Moderate (2+) mitral regurgitation present. Mild aortic sclerosis is present with normal valvular opening. Trace aortic regurgitation is present. Moderate to severe (3+) tricuspid valve regurgitation is present. Severe pulmonary hypertension. The left atrium is mildly dilated. Left ventricle systolic function appears impaired; segmental wall motion abnormalities noted. Estimated Ejection Fraction is 35- 40%. Mild concentric left ventricular hypertrophy is present. The IVC is dilated with decreased respiratory variation.    EKG 2/24: Rate 106. Atrial fibrillation with rapid ventricular response with premature ventricular or aberrantly conducted complexes. Right bundle branch block.    Assessment and Plan  See documented information attached.

## 2023-03-07 NOTE — DISCHARGE NOTE PROVIDER - CARE PROVIDERS DIRECT ADDRESSES
,DirectAddress_Unknown,[email or web address]

## 2023-03-07 NOTE — DISCHARGE NOTE PROVIDER - NSDCFUSCHEDAPPT_GEN_ALL_CORE_FT
[facility] Physician [address]  Scheduled Appointment: 03/20/2023

## 2023-03-07 NOTE — DISCHARGE NOTE PROVIDER - HOSPITAL COURSE
89 year old man with HTN, BPH, arthritis, presented 2/24 with exertional dyspnea and B/L LE edema, found to have new onset afib with RVR and CHF. Admitted to Medicine.     Acute respiratory failure with hypoxia  Likely multifactorial due to decompensated CHF, pulmonary edema, B/L pleural effusions, atelectasis, pericardial effusion, and diagnosis of COVID19 made mid-admission. Improved with treatment of these issues. Weaned to room air today.   - Continue to manage underlying issues, see below    COVID19 infection, unable to rule out COVID19 pneumonia  Appreciate input from ID. Started patient on remdesivir and dexamethasone 3/1.   - Completed remdesivir 5d course  - Continue dexamethasone (3/1-), plan for 10-day course  - Incentive spirometry  - Encourage mobility  - DVT px    New onset afib with RVR, PVCs, prolonged QTC  Converted to NSR 3/1 but with paroxysms of atrial fibrillation and periods of rapid response, significant PVC burden, prolonged QTC and infrequent wide complex tachycardia, likely NSVT. WCT appears to have resolved with amiodarone loading but still with PVCs, had PAT to 150 last night for ~10 sec. Appreciate input from Cardiology and Cardiac EP.   - Continue metoprolol. Amiodarone 400mg po BID for 2 weeks then plan to decrease to 400mg daily. Black box warning of Amiodarone discussed with patient. Will need out patient monitoring of LFT's, TSH, Opthalmology evaluations and PFTs.  - Continue long-term oral anticoagulation, on Eliquis  - ILR placed today for AFib / PVC burden   - Maintain K ~4, Mg ~2  - TSH WNL  - Plan to repeat echo in 3 months  - Outpatient follow up with Dr. Carranza    Acute systolic CHF  BNP 11,157. LVEF 35-40% on TTE. Also noted to have Mod MR, mod to severe TR, pHTN. Appreciate input from Cardiology. L heart cath 3/2. Non-obstructive coronary artery disease with heavy calcification. LVEDP 21. Continued on diuretics, metoprolol. Unable to use ACEI/ARB/ARNI at the present due to CrCl. Volume status improving, now on room air, still with B/L LE edema. Cardiology has switched IV Lasix to PO.  - Continue furosemide, metoprolol  - Strict Is and Os, daily wt, fluid restriction    Pulmonary HTN  As noted on TTE. Cardiac cath done, elevated LVED noted.   - Continue management of CHF    HTN  BP improved.  - Continue to monitor    Pericardial effusion  Rather small in side. No sign of tamponade.   - Continue to monitor    Abnormal urinalysis  UA with negative nitrite, small LE, >50 RBC, 11-25 WBCs, few bact. No dysuria or gross hematuria. No suprapubic pain for flank pain. Urine culture negative.   - Monitoring off antibiotics.     BPH  Stable  - Continue tamsulosin    Physical deconditioning and debility  PT consulted, recommended ROBBIE   89 year old man with HTN, BPH, arthritis, presented 2/24 with exertional dyspnea and B/L LE edema, found to have new onset afib with RVR, decompensated CHF with pulmonary edema, small pleural effusions and pericardial effusion, ultimately also diagnosed with COVID19 during the admission. Now overally improved. Stable for discharge to [facility].     Acute respiratory failure with hypoxia  Likely multifactorial due to decompensated CHF, pulmonary edema, B/L pleural effusions, atelectasis, pericardial effusion, and diagnosis of COVID19 made mid-admission. Improved with treatment of these issues. Weaned to room air.     COVID19 infection, unable to rule out COVID19 pneumonia  Appreciate input from ID. Started patient on remdesivir and dexamethasone 3/1. Completed remdesivir 5d course. Continue dexamethasone (3/1-), plan to complete 10-day course upon discharge (3 more days). Incentive spirometry. Encourage mobility    New onset afib with RVR, PVCs, prolonged QTC  Converted to NSR 3/1 but with paroxysms of atrial fibrillation and periods of rapid response, significant PVC burden, prolonged QTC and infrequent wide complex tachycardia, likely NSVT. Patient was started on amiodarone load. WCT resolved. Still with PVCs. S/P ILR 3/6 for monitoring of afib and PVCs. Continue amiodarone, decreased to 200mg daily by Cardiac Electrophysiology.     , had PAT to 150 last night for ~10 sec. Appreciate input from Cardiology and Cardiac EP.   - Continue metoprolol. Amiodarone 400mg po BID for 2 weeks then plan to decrease to 400mg daily. Black box warning of Amiodarone discussed with patient. Will need out patient monitoring of LFT's, TSH, Opthalmology evaluations and PFTs.  - Continue long-term oral anticoagulation, on Eliquis  - ILR placed today for AFib / PVC burden   - Maintain K ~4, Mg ~2  - TSH WNL  - Plan to repeat echo in 3 months  - Outpatient follow up with Dr. Carranza    Acute systolic CHF  BNP 11,157. LVEF 35-40% on TTE. Also noted to have Mod MR, mod to severe TR, pHTN. Appreciate input from Cardiology. L heart cath 3/2. Non-obstructive coronary artery disease with heavy calcification. LVEDP 21. Continued on diuretics, metoprolol. Unable to use ACEI/ARB/ARNI at the present due to CrCl. Volume status improving, now on room air, still with B/L LE edema. Cardiology has switched IV Lasix to PO.  - Continue furosemide, metoprolol  - Strict Is and Os, daily wt, fluid restriction    Pulmonary HTN  As noted on TTE. Cardiac cath done, elevated LVED noted.   - Continue management of CHF    HTN  BP improved.  - Continue to monitor    Pericardial effusion  Rather small in side. No sign of tamponade.   - Continue to monitor    Abnormal urinalysis  UA with negative nitrite, small LE, >50 RBC, 11-25 WBCs, few bact. No dysuria or gross hematuria. No suprapubic pain for flank pain. Urine culture negative.   - Monitoring off antibiotics.     BPH  Stable  - Continue tamsulosin    Physical deconditioning and debility  PT consulted, recommended ROBBIE   89 year old man with HTN, BPH, arthritis, presented 2/24 with exertional dyspnea and B/L LE edema, found to have new onset afib with RVR, decompensated CHF with pulmonary edema, small pleural effusions and pericardial effusion, ultimately also diagnosed with COVID19 during the admission. Now overally improved. Stable for discharge to [facility].     Acute respiratory failure with hypoxia  Likely multifactorial due to decompensated CHF, pulmonary edema, B/L pleural effusions, atelectasis, pericardial effusion, and diagnosis of COVID19 made mid-admission. Improved with treatment of these issues. Weaned to room air.     New onset afib with RVR, PVCs, prolonged QTC  Converted to NSR 3/1 but with paroxysms of atrial fibrillation and periods of rapid response, significant PVC burden, prolonged QTC and infrequent wide complex tachycardia, likely NSVT. Patient was started on amiodarone load. WCT resolved. Still with PVCs. S/P ILR 3/6 for monitoring of afib and PVCs. Continue amiodarone, decreased to 200mg daily by Cardiac Electrophysiology. Continue amiodarone and metoprolol. Black box warning of amiodarone discussed with patient. Will need out patient monitoring of LFT's, TSH, Opthalmology evaluations and PFTs. Continue long-term oral anticoagulation, on Eliquis. Plan to repeat echo in 3 months. Outpatient follow up with Dr. Carranza    Acute systolic CHF  BNP 11,157. LVEF 35-40% on TTE. Also noted to have Mod MR, mod to severe TR, pHTN. Appreciate input from Cardiology. L heart cath 3/2. Non-obstructive coronary artery disease with heavy calcification. LVEDP 21. Continued on diuretics, metoprolol. Unable to use ACEI/ARB/ARNI at the present due to CrCl. Volume status improved, now on room air. Still with some B/L LE edema but also improving. Cardiology has switched IV Lasix to PO. Continue furosemide, metoprolol. Monitor weight. Avoid added salt in diet. Cardiology follow up as outpatient with [facility].      Pulmonary HTN  As noted on TTE. Cardiac cath done, elevated LVED noted. Has since received management of CHF. Continue to monitor as outpatient.     HTN  BP improved. Continue to monitor    Pericardial effusion  Rather small in size. No sign of tamponade. Continue to monitor as outpatient.     COVID19 infection, unable to rule out COVID19 pneumonia  Appreciate input from ID. Started patient on remdesivir and dexamethasone 3/1. Completed remdesivir 5d course. Continue dexamethasone (3/1-), plan to complete 10-day course upon discharge (3 more days). Incentive spirometry. Encourage mobility    Abnormal urinalysis  UA with negative nitrite, small LE, >50 RBC, 11-25 WBCs, few bact. No dysuria or gross hematuria. No suprapubic pain for flank pain. Urine culture negative. Has been stable off antibiotics.     BPH  Stable. Continue tamsulosin    Physical deconditioning and debility  PT consulted, recommended ROBBIE. Continue restorative PT sessions.

## 2023-03-07 NOTE — DISCHARGE NOTE PROVIDER - PROVIDER TOKENS
PROVIDER:[TOKEN:[[id number]],FOLLOWUP:[2 weeks]],PROVIDER:[TOKEN:[[id number]],SCHEDULEDAPPT:[03/20/2023]],PROVIDER:[TOKEN:[[id number]],FOLLOWUP:[2 weeks]]

## 2023-03-07 NOTE — DISCHARGE NOTE NURSING/CASE MANAGEMENT/SOCIAL WORK - NSDCPEFALRISK_GEN_ALL_CORE
For information on Fall & Injury Prevention, visit: [email or web address] OR  [email or web address] OR  https://www.cdc.gov/steadi/patient.html

## 2023-03-07 NOTE — DISCHARGE NOTE PROVIDER - CARE PROVIDER_API CALL
Chandler Umanzor)  [facility]  [address], [address]  [place] [address]  Phone: [phone]  Fax: [phone]  Follow Up Time: 2 weeks    Shahla Carranza)  Cardiac Electrophysiology; Cardiovascular Disease; Internal Medicine  [address] [place] [address]  Phone: [phone]  Fax: [phone]  Scheduled Appointment: 03/20/2023    Kalyan Alvarez)  Cardiovascular Disease  [address], [place] [address]  Phone: [phone]  Fax: [phone]  Follow Up Time: 2 weeks

## 2023-03-07 NOTE — DISCHARGE NOTE NURSING/CASE MANAGEMENT/SOCIAL WORK - PATIENT PORTAL LINK FT
You can access the FollowMyHealth Patient Portal offered by [facility] by registering at the following website: [email or web address]. By joining [employer]’s FollowMyHealth portal, you will also be able to view your health information using other applications (apps) compatible with our system.

## 2023-03-08 ENCOUNTER — TRANSCRIPTION ENCOUNTER (OUTPATIENT)
Age: 88
End: 2023-03-08

## 2023-03-08 ENCOUNTER — APPOINTMENT (OUTPATIENT)
Dept: OPHTHALMOLOGY | Facility: CLINIC | Age: 88
End: 2023-03-08

## 2023-03-20 ENCOUNTER — NON-APPOINTMENT (OUTPATIENT)
Age: 88
End: 2023-03-20

## 2023-03-20 ENCOUNTER — APPOINTMENT (OUTPATIENT)
Dept: ELECTROPHYSIOLOGY | Facility: CLINIC | Age: 88
End: 2023-03-20

## 2023-03-20 ENCOUNTER — APPOINTMENT (OUTPATIENT)
Dept: ELECTROPHYSIOLOGY | Facility: CLINIC | Age: 88
End: 2023-03-20
Payer: MEDICARE

## 2023-03-20 VITALS
HEIGHT: 68 IN | OXYGEN SATURATION: 99 % | HEART RATE: 75 BPM | DIASTOLIC BLOOD PRESSURE: 57 MMHG | RESPIRATION RATE: 18 BRPM | SYSTOLIC BLOOD PRESSURE: 133 MMHG

## 2023-03-20 DIAGNOSIS — I31.39 OTHER PERICARDIAL EFFUSION (NONINFLAMMATORY): ICD-10-CM

## 2023-03-20 DIAGNOSIS — J12.82 PNEUMONIA DUE TO CORONAVIRUS DISEASE 2019: ICD-10-CM

## 2023-03-20 DIAGNOSIS — J96.01 ACUTE RESPIRATORY FAILURE WITH HYPOXIA: ICD-10-CM

## 2023-03-20 DIAGNOSIS — I34.0 NONRHEUMATIC MITRAL (VALVE) INSUFFICIENCY: ICD-10-CM

## 2023-03-20 DIAGNOSIS — N40.0 BENIGN PROSTATIC HYPERPLASIA WITHOUT LOWER URINARY TRACT SYMPTOMS: ICD-10-CM

## 2023-03-20 DIAGNOSIS — I25.10 ATHEROSCLEROTIC HEART DISEASE OF NATIVE CORONARY ARTERY WITHOUT ANGINA PECTORIS: ICD-10-CM

## 2023-03-20 DIAGNOSIS — N17.9 ACUTE KIDNEY FAILURE, UNSPECIFIED: ICD-10-CM

## 2023-03-20 DIAGNOSIS — R82.90 UNSPECIFIED ABNORMAL FINDINGS IN URINE: ICD-10-CM

## 2023-03-20 DIAGNOSIS — I45.10 UNSPECIFIED RIGHT BUNDLE-BRANCH BLOCK: ICD-10-CM

## 2023-03-20 DIAGNOSIS — I50.23 ACUTE ON CHRONIC SYSTOLIC (CONGESTIVE) HEART FAILURE: ICD-10-CM

## 2023-03-20 DIAGNOSIS — U07.1 COVID-19: ICD-10-CM

## 2023-03-20 DIAGNOSIS — I13.0 HYPERTENSIVE HEART AND CHRONIC KIDNEY DISEASE WITH HEART FAILURE AND STAGE 1 THROUGH STAGE 4 CHRONIC KIDNEY DISEASE, OR UNSPECIFIED CHRONIC KIDNEY DISEASE: ICD-10-CM

## 2023-03-20 DIAGNOSIS — Z79.82 LONG TERM (CURRENT) USE OF ASPIRIN: ICD-10-CM

## 2023-03-20 DIAGNOSIS — I42.8 OTHER CARDIOMYOPATHIES: ICD-10-CM

## 2023-03-20 DIAGNOSIS — I49.3 VENTRICULAR PREMATURE DEPOLARIZATION: ICD-10-CM

## 2023-03-20 DIAGNOSIS — M19.90 UNSPECIFIED OSTEOARTHRITIS, UNSPECIFIED SITE: ICD-10-CM

## 2023-03-20 DIAGNOSIS — I27.20 PULMONARY HYPERTENSION, UNSPECIFIED: ICD-10-CM

## 2023-03-20 DIAGNOSIS — Z87.891 PERSONAL HISTORY OF NICOTINE DEPENDENCE: ICD-10-CM

## 2023-03-20 DIAGNOSIS — I48.0 PAROXYSMAL ATRIAL FIBRILLATION: ICD-10-CM

## 2023-03-20 DIAGNOSIS — I25.84 CORONARY ATHEROSCLEROSIS DUE TO CALCIFIED CORONARY LESION: ICD-10-CM

## 2023-03-20 DIAGNOSIS — I48.92 UNSPECIFIED ATRIAL FLUTTER: ICD-10-CM

## 2023-03-20 DIAGNOSIS — I47.1 SUPRAVENTRICULAR TACHYCARDIA: ICD-10-CM

## 2023-03-20 DIAGNOSIS — N18.30 CHRONIC KIDNEY DISEASE, STAGE 3 UNSPECIFIED: ICD-10-CM

## 2023-03-20 PROCEDURE — 99214 OFFICE O/P EST MOD 30 MIN: CPT

## 2023-03-20 PROCEDURE — 93005 ELECTROCARDIOGRAM TRACING: CPT

## 2023-03-20 PROCEDURE — 93010 ELECTROCARDIOGRAM REPORT: CPT

## 2023-03-20 PROCEDURE — 93000 ELECTROCARDIOGRAM COMPLETE: CPT | Mod: 59

## 2023-03-20 PROCEDURE — 93285 PRGRMG DEV EVAL SCRMS IP: CPT

## 2023-03-20 NOTE — REVIEW OF SYSTEMS
[Dyspnea on exertion] : dyspnea during exertion [Negative] : Neurological [Palpitations] : no palpitations

## 2023-03-20 NOTE — HISTORY OF PRESENT ILLNESS
[FreeTextEntry1] : 89-year-old male with history of hypertension CHF BPH, diagnosis of paroxysmal atrial fibrillation on February 2023 when patient presented to PCP with dyspnea on exertion noted to have A-fib and Afl with RVR.  TTE from 2/25/2023 shows LVEF of 35 to 40% moderate MR moderate to severe TR severe pulmonary hypertension.  Left heart catheter shows nonobstructive CAD.  Patient is known to have frequent PVC and RBBB, LPHB he was started on amiodarone p.o. loading dose Eliquis 5 mg twice daily also takes metoprolol 25 mg twice daily.\par Pt feels better in SR, improvement in dyspnea, denies dizziness, syncope. \par ECG Sinus  Rhythm  - frequent multiform ectopic ventricular beats , APCs, Right bundle branch block and right axis, posterior fasicular block\par \par ILR shows SR poor P wave and frequent APC and frequent PVC 11%

## 2023-03-20 NOTE — DISCUSSION/SUMMARY
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  Patient is at high risk for having worsening of conduction disease however he feels much better in sinus rhythm with amiodarone therapy we will continue to monitor he may need a pacemaker in the future\par Follow-up with cardiology to optimize CHF medical therapy\par Continue DOAC for stroke prevention\par Total length of time spent with this patient was 45 minutes and more then half of the time was spent face to face with the patient as well as counseling and coordination of care as stated above.\par

## 2023-03-23 ENCOUNTER — NON-APPOINTMENT (OUTPATIENT)
Age: 88
End: 2023-03-23

## 2023-03-25 ENCOUNTER — RX RENEWAL (OUTPATIENT)
Age: 88
End: 2023-03-25

## 2023-03-28 ENCOUNTER — TRANSCRIPTION ENCOUNTER (OUTPATIENT)
Age: 88
End: 2023-03-28

## 2023-03-31 NOTE — DISCHARGE NOTE PROVIDER - YES NO FOR MLM POSITIVE OR NEGATIVE COVID RESULT
[facility]    OUTPATIENT PHYSICAL THERAPY ORTHOPEDIC EVALUATION  PLAN OF TREATMENT FOR OUTPATIENT REHABILITATION  (COMPLETE FOR INITIAL CLAIMS ONLY)  Patient's Last Name, First Name, M.I.  YOB: 1956  Kat Huertas       Provider s Name:  [facility]   Medical Record No.  [id number]   

## 2023-04-09 ENCOUNTER — RX RENEWAL (OUTPATIENT)
Age: 88
End: 2023-04-09

## 2023-04-09 ENCOUNTER — RESULT CHARGE (OUTPATIENT)
Age: 88
End: 2023-04-09

## 2023-04-10 ENCOUNTER — NON-APPOINTMENT (OUTPATIENT)
Age: 88
End: 2023-04-10

## 2023-04-10 ENCOUNTER — APPOINTMENT (OUTPATIENT)
Dept: CARDIOLOGY | Facility: CLINIC | Age: 88
End: 2023-04-10
Payer: MEDICARE

## 2023-04-10 VITALS
DIASTOLIC BLOOD PRESSURE: 60 MMHG | HEIGHT: 68 IN | HEART RATE: 82 BPM | OXYGEN SATURATION: 97 % | SYSTOLIC BLOOD PRESSURE: 136 MMHG

## 2023-04-10 PROCEDURE — 93010 ELECTROCARDIOGRAM REPORT: CPT

## 2023-04-10 PROCEDURE — 99214 OFFICE O/P EST MOD 30 MIN: CPT

## 2023-04-10 PROCEDURE — 93000 ELECTROCARDIOGRAM COMPLETE: CPT

## 2023-04-10 RX ORDER — LISINOPRIL AND HYDROCHLOROTHIAZIDE TABLETS 20; 12.5 MG/1; MG/1
20-12.5 TABLET ORAL
Qty: 180 | Refills: 1 | Status: DISCONTINUED | COMMUNITY
Start: 2020-11-10 | End: 2023-04-10

## 2023-04-10 RX ORDER — LATANOPROST/PF 0.005 %
0.01 DROPS OPHTHALMIC (EYE)
Refills: 0 | Status: ACTIVE | COMMUNITY
Start: 2022-11-28

## 2023-04-11 NOTE — HISTORY OF PRESENT ILLNESS
[FreeTextEntry1] : 89 y/o male PMH: HTN, BPH, arthritis here today post hospital follow up; admitted with NEVAREZ/ B/L LE edema, found to have new onset afib with RVR Acute respiratory failure with hypoxia with CHF/ pulmonary edema/ small pleural effusions and pericardial effusion, diagnosed with COVID19 \par TTE from 2/25/2023 shows LVEF of 35 to 40% moderate MR moderate to severe TR severe pulmonary hypertension. Left heart catheter shows nonobstructive CAD. \par H/O frequent PVC and RBBB, LPHB AF he was started on amiodarone and Eliquis 5 mg twice daily also takes metoprolol 25 mg twice daily.\par ILR shows SR poor P wave and frequent APC and frequent PVC 11% \par  \par \par  \par \par

## 2023-04-11 NOTE — PHYSICAL EXAM
[Well Developed] : well developed [Well Nourished] : well nourished [No Acute Distress] : no acute distress [Normal Venous Pressure] : normal venous pressure [No Carotid Bruit] : no carotid bruit [Normal S1, S2] : normal S1, S2 [Murmur] : murmur [Clear Lung Fields] : clear lung fields [Soft] : abdomen soft [Non Tender] : non-tender [Normal Gait] : normal gait [Edema ___] : edema [unfilled] [No Rash] : no rash [Moves all extremities] : moves all extremities [Alert and Oriented] : alert and oriented

## 2023-04-11 NOTE — DISCUSSION/SUMMARY
[FreeTextEntry1] : 89 y/o male here today post hospital discharge  PMH: NICM EF 35-40% possibly 2/2 tachyrhythmia s/p Left heart catheter shows nonobstructive CAD,  moderate MR severe pHTN, New onset AF/PVC's maintained on BB/Amiodarone/oral AC for stroke prevention, he was ultimately diagnosed with COVID\par Will Continue amiodarone 200 MG daily discussed need out patient monitoring of LFT's, TSH, Opthalmology\par evaluations and PFTs.  s/p  ILR followed with EP ILR shows SR poor P wave and frequent APC and frequent PVC 11% burden no significant pauses PVC burden 11% CW beta Blocker\par Has frequent PVC and RBBB, LPHB he may need a pacemaker in the future.\par \par HFrEF/NICM ( possibly 2/2 tachyrhythmia ) Cardiac cath 3/2. Non-obstructive coronary artery disease\par ? Entresto repeat labs( CrCl ) \par diuretics, metoprolol. Unable to use ACEI/ARB/ARNI at the present due to CrCl. \par Monitor weight. Avoid added salt in diet. \par to repeat echo in 3 months.\par  \par HTN\par  \par Pericardial effusion small in size. No sign of tamponade. F/U Echo 3 months \par \par  \par Pulmonary HTN Continue management of CHF\par  \par \par

## 2023-04-13 NOTE — CARDIOLOGY SUMMARY
[de-identified] : 4/11/23 RSR frequent PVC's, RBBB [de-identified] : Impression\par  \par  Summary\par  \par  The mitral valve leaflets appear thickened.\par  Moderate (2+) mitral regurgitation ispresent.\par  Mild aortic sclerosis is present with normal valvular opening.\par  Trace aortic regurgitation is present.\par  Moderate to severe (3+) tricuspid valve regurgitation is present.\par  Severe pulmonary hypertension.\par  The left atrium is mildly dilated.\par  Left ventricle systolic function appears impaired; segmental wall motion\par  abnormalities noted. Estimated Ejection Fraction is 35- 40%.\par  Mild concentric left ventricular hypertrophy is present.\par  The IVC is dilated with decreased respiratory variation.\par

## 2023-04-13 NOTE — DISCUSSION/SUMMARY
[FreeTextEntry1] : Afib: New onset afib with RVR, currently in RSR with frequent APC's and PVC's with RBBB.  Continue amiodarone, metoprolol and eliquis.  Pt will continue to have ILR monitored by EP.  EKG done to check for arrhythmias\par \par Acute systolic CHF: BNP 11,157. LVEF 35-40% on TTE. Also noted to have Mod MR, mod to severe TR,\par pHTN. LHC Non-obstructive CAD with heavy calcification. LVEDP 21. C/w lasix, metoprolol. Patient was previously taking lisinopril prior to hospital.  Will resume lisinopril but at lower dose 10mg daiy (Cr 0.96 on 3/27).  Will repeat labs in 2 weeks. Monitor weight daily. Avoid added salt in diet. \par  \par HTN: controlled. continue current medications.\par  \par Pericardial effusion: rather small in size. No sign of tamponade. echo to be repeated in 3 months\par  \par Follow up in 6 weeks\par \par \par \par

## 2023-04-13 NOTE — HISTORY OF PRESENT ILLNESS
[FreeTextEntry1] : 89 year old man with a history of hypertension, BPH, and arthritis who presents today for hospital follow up.  He \par presented to  for NEVAREZ and bilateral LE edema. In the ED he was diagnosed with new-onset Afib with RVR\par and CHF with pulmonary edema small pleural effusions and pericardial effusion.  He was also diagnosed with COVID19 during the admission.  Troponin mildly elevated.  An echocardiogram revealed EF 35-40%, moderate MR, mod-severe TR, severe pulmonary HTN, wall motion abnormalities, mild LVH. LHC revealed nonobstructive CAD.  He was started on amiodarone loading dose and eliquis. On March 1 he converted to sinus rhythm.  ILR implanted prior to discharge.  \par \par He presents today with his daughter and son. He is feeling good and offers no complaints of cp,sob. He still has LE edema however his daughter states this is improving.  Pt recently seen by . Office note reviewed. ILR shows SR, poor R wave and frequent APC and frequent PVC 11%\par

## 2023-04-13 NOTE — REVIEW OF SYSTEMS
[Lower Ext Edema] : lower extremity edema [Negative] : Heme/Lymph [SOB] : no shortness of breath [Dyspnea on exertion] : not dyspnea during exertion [Chest Discomfort] : no chest discomfort [Leg Claudication] : no intermittent leg claudication [Palpitations] : no palpitations [Syncope] : no syncope [Rash] : no rash [Dizziness] : no dizziness

## 2023-04-13 NOTE — END OF VISIT
[FreeTextEntry3] : patient was seen with NP agree with assessment and plan  [Time Spent: ___ minutes] : I have spent [unfilled] minutes of time on the encounter.

## 2023-05-07 ENCOUNTER — RX RENEWAL (OUTPATIENT)
Age: 88
End: 2023-05-07

## 2023-05-12 ENCOUNTER — RX RENEWAL (OUTPATIENT)
Age: 88
End: 2023-05-12

## 2023-05-15 ENCOUNTER — APPOINTMENT (OUTPATIENT)
Dept: INTERNAL MEDICINE | Facility: CLINIC | Age: 88
End: 2023-05-15
Payer: MEDICARE

## 2023-05-15 VITALS — DIASTOLIC BLOOD PRESSURE: 76 MMHG | HEART RATE: 74 BPM | SYSTOLIC BLOOD PRESSURE: 136 MMHG

## 2023-05-15 PROCEDURE — 99214 OFFICE O/P EST MOD 30 MIN: CPT

## 2023-05-15 NOTE — HISTORY OF PRESENT ILLNESS
[de-identified] : Presents for follow-up after recent hospitalization and extended rehab stay after episode of atrial fibrillation and CHF.\par Has been feeling better.  Denies any chest pain or shortness of breath.  Presently on amiodarone and Eliquis.  There were other medication adjustments as well.  He has had follow-up with Dr. Palla and Dr. Carranza.  He is here today with his son and daughter.  He was discharged from rehab approximately 1 week ago.  There has been some increased swelling in his legs since being home.  He has not been particularly careful with his diet in terms of salt intake.

## 2023-05-15 NOTE — ASSESSMENT
[FreeTextEntry1] : CHF/hypertension/paroxysmal atrial fibrillation–he remains in sinus rhythm at present.  Symptomatically he is doing well but has had some increased leg edema since being home.  Strict salt restriction was discussed.  He has not been particular compliant since being home.\par He is going to increase Lasix to 40 mg twice daily for 7-10 days and follow-up if no improvement.\par He will continue metoprolol ER 25 mg daily, lisinopril 10 mg daily, Cardura 10 mg daily, potassium ER 20 mg daily, Eliquis 5 mg twice daily and amiodarone 200 mg daily.\par \par Hyperlipidemia–for now is remain on Lipitor 20 mg daily.

## 2023-05-15 NOTE — PHYSICAL EXAM
[No Acute Distress] : no acute distress [No JVD] : no jugular venous distention [No Respiratory Distress] : no respiratory distress  [Normal Rate] : normal rate  [Regular Rhythm] : with a regular rhythm [de-identified] : Bilateral 2+ lower leg edema

## 2023-05-15 NOTE — REVIEW OF SYSTEMS
[Fever] : no fever [Chest Pain] : no chest pain [Palpitations] : no palpitations [Lower Ext Edema] : lower extremity edema [Shortness Of Breath] : no shortness of breath [Dyspnea on Exertion] : not dyspnea on exertion [Abdominal Pain] : no abdominal pain

## 2023-05-25 ENCOUNTER — APPOINTMENT (OUTPATIENT)
Dept: CARDIOLOGY | Facility: CLINIC | Age: 88
End: 2023-05-25

## 2023-06-14 ENCOUNTER — APPOINTMENT (OUTPATIENT)
Dept: ELECTROPHYSIOLOGY | Facility: CLINIC | Age: 88
End: 2023-06-14

## 2023-06-15 ENCOUNTER — FORM ENCOUNTER (OUTPATIENT)
Age: 88
End: 2023-06-15

## 2023-06-26 ENCOUNTER — NON-APPOINTMENT (OUTPATIENT)
Age: 88
End: 2023-06-26

## 2023-06-29 ENCOUNTER — EMERGENCY (EMERGENCY)
Facility: HOSPITAL | Age: 88
LOS: 1 days | Discharge: HOME | End: 2023-06-29
Attending: EMERGENCY MEDICINE
Payer: MEDICARE

## 2023-06-29 VITALS
DIASTOLIC BLOOD PRESSURE: 70 MMHG | HEART RATE: 84 BPM | SYSTOLIC BLOOD PRESSURE: 154 MMHG | RESPIRATION RATE: 16 BRPM | WEIGHT: 190.04 LBS | OXYGEN SATURATION: 99 % | TEMPERATURE: 98 F

## 2023-06-29 DIAGNOSIS — Z90.89 ACQUIRED ABSENCE OF OTHER ORGANS: Chronic | ICD-10-CM

## 2023-06-29 PROCEDURE — 99284 EMERGENCY DEPT VISIT MOD MDM: CPT | Mod: 25

## 2023-06-29 PROCEDURE — 70450 CT HEAD/BRAIN W/O DYE: CPT | Mod: MA

## 2023-06-29 PROCEDURE — 99284 EMERGENCY DEPT VISIT MOD MDM: CPT | Mod: GC

## 2023-06-29 PROCEDURE — 70450 CT HEAD/BRAIN W/O DYE: CPT | Mod: 26,MA

## 2023-06-29 NOTE — ED ADULT TRIAGE NOTE - CHIEF COMPLAINT QUOTE
fall from standing height on aticoags. -LOC.  fell backwards, skin tare noted to back of head, bleeding controlled. fall from standing height on aticoags. -LOC.  fell backwards, skin tare noted to back of head, bleeding controlled. Denies CP, lightheadedness.  MD Reddy called to bedside for eval. fall from standing height on aticoags. -LOC.  fell backwards, skin tare noted to back of head, bleeding controlled. Denies CP, lightheadedness.  MD Bryant called to bedside for eval.  Priority CT called.

## 2023-06-29 NOTE — ED PROVIDER NOTE - NSFOLLOWUPINSTRUCTIONS_ED_ALL_ED_FT
Please follow-up with your primary care doctor.  Please call for an appointment in the next 48 hours but if you cannot follow-up with your primary care doctor please return to the Emergency Department for any urgent issues.    You were given a copy of the tests performed today.  Please bring the results with you and review them with your primary care doctor.    If you have any worsening of symptoms or any other concerns please return to the Emergency Department immediately.    Please continue taking your home medications as directed.    Contusion    A contusion is a deep bruise. Contusions are the result of a blunt injury to tissues and muscle fibers under the skin. The skin overlying the contusion may turn blue, purple, or yellow. Symptoms also include pain and swelling in the injured area.    SEEK IMMEDIATE MEDICAL CARE IF YOU HAVE ANY OF THE FOLLOWING SYMPTOMS: severe pain, numbness, tingling, pain, weakness, or skin color/temperature change in any part of your body distal to the injury.

## 2023-06-29 NOTE — ED ADULT TRIAGE NOTE - PRO INTERPRETER NEED 2
General Daily Progress Note    Admission Date: 9/28/2020  Hospital Day 6  Post-Op Day Not Applicable  Subjective:   Pain about the same, but trying to use the oral medication more and the IV medication less. Currently nauseated after having had breakfast.      Objective:     Patient Vitals for the past 24 hrs:   BP Temp Pulse Resp SpO2   10/03/20 0827 106/68 97.8 °F (36.6 °C) 95 18 99 %   10/03/20 0104 106/65 97.5 °F (36.4 °C) 73 18 98 %   10/02/20 2012 95/64 97.9 °F (36.6 °C) 91 16 96 %   10/02/20 1500 99/77 98.7 °F (37.1 °C) 85 17 99 %     No intake/output data recorded. 10/01 1901 - 10/03 0700  In: 5123 [I.V.:5123]  Out: 4100 [Urine:4100]      Physical Examination:  General Appearance - Somewhat uncomfortable  Abdomen - Non-distended.  Soft.  Mildly tender. Data Review No results found for this or any previous visit (from the past 24 hour(s)). 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 Desmond Anderson on 9/30/2020.     Continue low fiber diet. Decrease maintenance IV fluid rate. Continue cholestyramine and sucralfate begun by Dr. Desmond Anderson on 9/30/2020.     Continue parenteral metronidazole and mesalamine enemas.     Continue shifting to oral pain medication. Possible discharge tomorrow. English

## 2023-06-29 NOTE — ED PROVIDER NOTE - PATIENT PORTAL LINK FT
You can access the FollowMyHealth Patient Portal offered by [facility] by registering at the following website: [email or web address]. By joining [employer]’s FollowMyHealth portal, you will also be able to view your health information using other applications (apps) compatible with our system. You can access the FollowMyHealth Patient Portal offered by [facility] by registering at the following website: [email or web address]. By joining [email or web address]’s FollowMyHealth portal, you will also be able to view your health information using other applications (apps) compatible with our system. You can access the FollowMyHealth Patient Portal offered by [facility] by registering at the following website: [email or web address]. By joining [employer]’s FollowMyHealth portal, you will also be able to view your health information using other applications (apps) compatible with our system.

## 2023-06-29 NOTE — ED PROVIDER NOTE - PHYSICAL EXAMINATION
General: Well appearing in no acute distress, alert and cooperative  Head: Normocephalic, posterior scalp abrasion  Eyes: PERRLA, no conjunctival injection, no scleral icterus, EOMI  ENMT: Atraumatic external nose and ears  Neck: Soft and supple, full ROM without pain, no midline tenderness  Cardiac: Regular rate and regular rhythm, no murmurs  Resp: Unlabored respiratory effort, lungs CTAB  Abd: Soft, non-tender, non-distended  MSK: Spine midline and non-tender  Skin: Warm and dry  Neuro: AO x 3, moves all extremities symmetrically, Motor strength 5/5 bilaterally UE and LE, sensation grossly intact

## 2023-06-29 NOTE — ED ADULT TRIAGE NOTE - NS ED NURSE AMBULANCES
[facility]

## 2023-06-29 NOTE — ED PROVIDER NOTE - OBJECTIVE STATEMENT
89y Male 89y Male with history of HTN, CHF, afib (on Eliquis) BIBEMS presenting with head injury after trip and fall with head strike. Patient denies LOC or headache. Patient reports he ambulates with walker however went outside without walker and missed his step. Patient denies headache, lightheadedness, chest pain, palpitations, shortness of breath, focal weakness. Denies fevers, chills, cough, nausea, vomiting, diarrhea, hematuria, dysuria, dark stools.

## 2023-06-29 NOTE — ED ADULT NURSE NOTE - CHIEF COMPLAINT QUOTE
fall from standing height on aticoags. -LOC.  fell backwards, skin tare noted to back of head, bleeding controlled. Denies CP, lightheadedness.  MD Bryant called to bedside for eval.  Priority CT called.

## 2023-06-29 NOTE — ED PROVIDER NOTE - PROGRESS NOTE DETAILS
spoke with alphonso who confirms patient's story and states that patient should be ambulating with a walker but at times does not. Conversation had with patient regarding results of lab work and imaging. Patient agrees with plan for discharge at this time. Patient agrees to comply with follow up to primary care doctor. Return to ED precautions and discharge instructions discussed with patient with verbal understanding. -Annette,

## 2023-06-29 NOTE — ED PROVIDER NOTE - CLINICAL SUMMARY MEDICAL DECISION MAKING FREE TEXT BOX
89y Male with head injury in the setting of mechanical fall  on AC with head strike but no LOC. No lightheadedness, chest pain, palpitations, focal weakness. Hemodynamically stable, neurovascularly intact, posterior scalp abrasion. Differential diagnosis includes but not limited to ICH, contusion. Imaging independently reviewed and interpreted with no acute pathology noted. Discharge with pcp follow up.

## 2023-06-29 NOTE — ED PROVIDER NOTE - ATTENDING CONTRIBUTION TO CARE
Beba: I performed a face to face evaluation of this patient and performed a full history and physical examination on the patient.  I agree with the resident's history, physical examination, and plan of the patient unless otherwise noted. My brief assessment is as follows: pmh as documented present s/p fall while walkin to garden. didn't use walker and lost his balance, falling backwards, with skin tear to occiput. no loc. + a/c for afib, no neck pain, no headache, no neuro symptoms, no dizziness, no cp/sob. no other complaints. family confirmed mechanical trip and fall and has it on camera. pt at baseline otherwise. neuro intact, no other trauma besides occipital skin avulsion, no midline neck ttp. cth neg, wound care, return precautions.

## 2023-07-07 ENCOUNTER — RX RENEWAL (OUTPATIENT)
Age: 88
End: 2023-07-07

## 2023-07-09 ENCOUNTER — RX RENEWAL (OUTPATIENT)
Age: 88
End: 2023-07-09

## 2023-07-12 ENCOUNTER — APPOINTMENT (OUTPATIENT)
Dept: ELECTROPHYSIOLOGY | Facility: CLINIC | Age: 88
End: 2023-07-12
Payer: MEDICARE

## 2023-07-12 ENCOUNTER — NON-APPOINTMENT (OUTPATIENT)
Age: 88
End: 2023-07-12

## 2023-07-12 PROCEDURE — 93298 REM INTERROG DEV EVAL SCRMS: CPT

## 2023-07-12 PROCEDURE — G2066: CPT

## 2023-07-17 ENCOUNTER — RX RENEWAL (OUTPATIENT)
Age: 88
End: 2023-07-17

## 2023-08-04 ENCOUNTER — NON-APPOINTMENT (OUTPATIENT)
Age: 88
End: 2023-08-04

## 2023-08-04 RX ORDER — AMIODARONE HYDROCHLORIDE 200 MG/1
200 TABLET ORAL DAILY
Qty: 90 | Refills: 0 | Status: DISCONTINUED | COMMUNITY
Start: 2023-07-09 | End: 2023-08-04

## 2023-08-16 ENCOUNTER — NON-APPOINTMENT (OUTPATIENT)
Age: 88
End: 2023-08-16

## 2023-08-16 ENCOUNTER — APPOINTMENT (OUTPATIENT)
Dept: ELECTROPHYSIOLOGY | Facility: CLINIC | Age: 88
End: 2023-08-16
Payer: MEDICARE

## 2023-08-16 PROCEDURE — 93298 REM INTERROG DEV EVAL SCRMS: CPT

## 2023-08-16 PROCEDURE — G2066: CPT

## 2023-08-18 ENCOUNTER — NON-APPOINTMENT (OUTPATIENT)
Age: 88
End: 2023-08-18

## 2023-08-24 ENCOUNTER — NON-APPOINTMENT (OUTPATIENT)
Age: 88
End: 2023-08-24

## 2023-08-24 ENCOUNTER — APPOINTMENT (OUTPATIENT)
Dept: CARDIOLOGY | Facility: CLINIC | Age: 88
End: 2023-08-24
Payer: MEDICARE

## 2023-08-24 VITALS
OXYGEN SATURATION: 98 % | SYSTOLIC BLOOD PRESSURE: 150 MMHG | DIASTOLIC BLOOD PRESSURE: 68 MMHG | WEIGHT: 170 LBS | HEIGHT: 68 IN | HEART RATE: 58 BPM | BODY MASS INDEX: 25.76 KG/M2

## 2023-08-24 PROCEDURE — 93000 ELECTROCARDIOGRAM COMPLETE: CPT

## 2023-08-24 PROCEDURE — 99214 OFFICE O/P EST MOD 30 MIN: CPT

## 2023-08-24 RX ORDER — METOPROLOL SUCCINATE 25 MG/1
25 TABLET, EXTENDED RELEASE ORAL DAILY
Qty: 90 | Refills: 0 | Status: DISCONTINUED | COMMUNITY
Start: 1900-01-01 | End: 2023-08-24

## 2023-08-29 ENCOUNTER — RX RENEWAL (OUTPATIENT)
Age: 88
End: 2023-08-29

## 2023-08-30 NOTE — HISTORY OF PRESENT ILLNESS
[FreeTextEntry1] : 89 year old man PMH: CHF HFrEF  LHC revealed nonobstructive CAD, LE Edema, pleural/pericardial effusion, AF s/p ILR followed with EP, hypertension, BPH, COVID and arthritis who presents today in routine cardiac follow up reports he is doing well no cardiovascular complaints, does c/o fatigue   prior Ep notes reviewed PT iff Amio and BB due to pauses EKG today reviewed with CARLOS A YA NP she will follow with Dr Carranza to follow with patient.

## 2023-08-30 NOTE — PHYSICAL EXAM
[Normal S1, S2] : normal S1, S2 [Normal] : clear lung fields, good air entry, no respiratory distress [Soft] : abdomen soft [Non Tender] : non-tender [Moves all extremities] : moves all extremities [Alert and Oriented] : alert and oriented [de-identified] : Trace LE Edema

## 2023-08-30 NOTE — CARDIOLOGY SUMMARY
[de-identified] : 4/11/23 RSR frequent PVC's, RBBB [de-identified] : Impression\par   \par   Summary\par   \par   The mitral valve leaflets appear thickened.\par   Moderate (2+) mitral regurgitation ispresent.\par   Mild aortic sclerosis is present with normal valvular opening.\par   Trace aortic regurgitation is present.\par   Moderate to severe (3+) tricuspid valve regurgitation is present.\par   Severe pulmonary hypertension.\par   The left atrium is mildly dilated.\par   Left ventricle systolic function appears impaired; segmental wall motion\par   abnormalities noted. Estimated Ejection Fraction is 35- 40%.\par   Mild concentric left ventricular hypertrophy is present.\par   The IVC is dilated with decreased respiratory variation.\par

## 2023-08-30 NOTE — DISCUSSION/SUMMARY
[FreeTextEntry1] : Here today in routine cardiac follow up with above hx,   AF: EKG from today reviewed with EP Dr Carranza to follow with patient, CW oral AC remain off Amio and BB 2/2 pauses,  Update spoke to daughter she will speak to Ophthalmologist for alternative use to Timolol   HFrEF: Appears euvolemic CW Diuretic, ACE CW dailky weight, low sodium diet, Consult with Heart failure team, repeat Echo ordered    HTN: controlled   Pericardial effusion: rather small in size. No sign of tamponade. echo to be repeated now   Follow up in 6 weeks

## 2023-08-31 ENCOUNTER — NON-APPOINTMENT (OUTPATIENT)
Age: 88
End: 2023-08-31

## 2023-09-20 ENCOUNTER — NON-APPOINTMENT (OUTPATIENT)
Age: 88
End: 2023-09-20

## 2023-09-20 ENCOUNTER — APPOINTMENT (OUTPATIENT)
Dept: ELECTROPHYSIOLOGY | Facility: CLINIC | Age: 88
End: 2023-09-20
Payer: MEDICARE

## 2023-09-21 PROCEDURE — G2066: CPT

## 2023-09-21 PROCEDURE — 93298 REM INTERROG DEV EVAL SCRMS: CPT

## 2023-09-25 ENCOUNTER — RX RENEWAL (OUTPATIENT)
Age: 88
End: 2023-09-25

## 2023-09-28 ENCOUNTER — APPOINTMENT (OUTPATIENT)
Dept: OPHTHALMOLOGY | Facility: CLINIC | Age: 88
End: 2023-09-28
Payer: MEDICARE

## 2023-09-28 ENCOUNTER — NON-APPOINTMENT (OUTPATIENT)
Age: 88
End: 2023-09-28

## 2023-09-28 PROCEDURE — 92014 COMPRE OPH EXAM EST PT 1/>: CPT

## 2023-09-28 PROCEDURE — 92133 CPTRZD OPH DX IMG PST SGM ON: CPT

## 2023-10-25 ENCOUNTER — APPOINTMENT (OUTPATIENT)
Dept: ELECTROPHYSIOLOGY | Facility: CLINIC | Age: 88
End: 2023-10-25
Payer: MEDICARE

## 2023-10-25 ENCOUNTER — NON-APPOINTMENT (OUTPATIENT)
Age: 88
End: 2023-10-25

## 2023-10-26 PROCEDURE — G2066: CPT

## 2023-10-26 PROCEDURE — 93298 REM INTERROG DEV EVAL SCRMS: CPT

## 2023-10-30 RX ORDER — POTASSIUM CHLORIDE 1500 MG/1
20 TABLET, FILM COATED, EXTENDED RELEASE ORAL
Qty: 90 | Refills: 0 | Status: ACTIVE | COMMUNITY
Start: 1900-01-01 | End: 1900-01-01

## 2023-10-31 ENCOUNTER — RX RENEWAL (OUTPATIENT)
Age: 88
End: 2023-10-31

## 2023-11-20 ENCOUNTER — RX RENEWAL (OUTPATIENT)
Age: 88
End: 2023-11-20

## 2023-11-29 ENCOUNTER — APPOINTMENT (OUTPATIENT)
Dept: ELECTROPHYSIOLOGY | Facility: CLINIC | Age: 88
End: 2023-11-29
Payer: MEDICARE

## 2023-11-29 ENCOUNTER — NON-APPOINTMENT (OUTPATIENT)
Age: 88
End: 2023-11-29

## 2023-11-30 PROCEDURE — 93298 REM INTERROG DEV EVAL SCRMS: CPT

## 2023-11-30 PROCEDURE — G2066: CPT | Mod: NC

## 2023-12-04 ENCOUNTER — APPOINTMENT (OUTPATIENT)
Dept: INTERNAL MEDICINE | Facility: CLINIC | Age: 88
End: 2023-12-04
Payer: MEDICARE

## 2023-12-04 ENCOUNTER — NON-APPOINTMENT (OUTPATIENT)
Age: 88
End: 2023-12-04

## 2023-12-04 VITALS
WEIGHT: 194 LBS | HEIGHT: 68 IN | DIASTOLIC BLOOD PRESSURE: 82 MMHG | SYSTOLIC BLOOD PRESSURE: 138 MMHG | BODY MASS INDEX: 29.4 KG/M2

## 2023-12-04 DIAGNOSIS — E78.5 HYPERLIPIDEMIA, UNSPECIFIED: ICD-10-CM

## 2023-12-04 DIAGNOSIS — N40.0 BENIGN PROSTATIC HYPERPLASIA WITHOUT LOWER URINARY TRACT SYMPMS: ICD-10-CM

## 2023-12-04 DIAGNOSIS — Z00.00 ENCOUNTER FOR GENERAL ADULT MEDICAL EXAMINATION W/OUT ABNORMAL FINDINGS: ICD-10-CM

## 2023-12-04 PROCEDURE — 90662 IIV NO PRSV INCREASED AG IM: CPT

## 2023-12-04 PROCEDURE — G0439: CPT

## 2023-12-04 PROCEDURE — G0008: CPT

## 2023-12-04 PROCEDURE — 36415 COLL VENOUS BLD VENIPUNCTURE: CPT

## 2023-12-05 LAB
ALBUMIN SERPL ELPH-MCNC: 4.3 G/DL
ALP BLD-CCNC: 88 U/L
ALT SERPL-CCNC: 14 U/L
ANION GAP SERPL CALC-SCNC: 13 MMOL/L
AST SERPL-CCNC: 18 U/L
BASOPHILS # BLD AUTO: 0.01 K/UL
BASOPHILS NFR BLD AUTO: 0.2 %
BILIRUB SERPL-MCNC: 0.5 MG/DL
BUN SERPL-MCNC: 31 MG/DL
CALCIUM SERPL-MCNC: 8.9 MG/DL
CHLORIDE SERPL-SCNC: 107 MMOL/L
CHOLEST SERPL-MCNC: 166 MG/DL
CO2 SERPL-SCNC: 23 MMOL/L
CREAT SERPL-MCNC: 1.25 MG/DL
EGFR: 55 ML/MIN/1.73M2
EOSINOPHIL # BLD AUTO: 0.1 K/UL
EOSINOPHIL NFR BLD AUTO: 2.2 %
GLUCOSE SERPL-MCNC: 105 MG/DL
HCT VFR BLD CALC: 35.7 %
HDLC SERPL-MCNC: 72 MG/DL
HGB BLD-MCNC: 11.4 G/DL
IMM GRANULOCYTES NFR BLD AUTO: 0.2 %
LDLC SERPL CALC-MCNC: 63 MG/DL
LYMPHOCYTES # BLD AUTO: 0.9 K/UL
LYMPHOCYTES NFR BLD AUTO: 20.2 %
MAN DIFF?: NORMAL
MCHC RBC-ENTMCNC: 29.8 PG
MCHC RBC-ENTMCNC: 31.9 GM/DL
MCV RBC AUTO: 93.2 FL
MONOCYTES # BLD AUTO: 0.39 K/UL
MONOCYTES NFR BLD AUTO: 8.7 %
NEUTROPHILS # BLD AUTO: 3.05 K/UL
NEUTROPHILS NFR BLD AUTO: 68.5 %
NONHDLC SERPL-MCNC: 94 MG/DL
PLATELET # BLD AUTO: 157 K/UL
POTASSIUM SERPL-SCNC: 4.4 MMOL/L
PROT SERPL-MCNC: 6.3 G/DL
PSA SERPL-MCNC: 4.12 NG/ML
RBC # BLD: 3.83 M/UL
RBC # FLD: 14 %
SODIUM SERPL-SCNC: 143 MMOL/L
TRIGL SERPL-MCNC: 194 MG/DL
WBC # FLD AUTO: 4.46 K/UL

## 2023-12-18 ENCOUNTER — NON-APPOINTMENT (OUTPATIENT)
Age: 88
End: 2023-12-18

## 2023-12-18 ENCOUNTER — APPOINTMENT (OUTPATIENT)
Dept: OPHTHALMOLOGY | Facility: CLINIC | Age: 88
End: 2023-12-18
Payer: MEDICARE

## 2023-12-18 PROCEDURE — 92012 INTRM OPH EXAM EST PATIENT: CPT

## 2023-12-18 PROCEDURE — 92083 EXTENDED VISUAL FIELD XM: CPT

## 2023-12-26 ENCOUNTER — RX RENEWAL (OUTPATIENT)
Age: 88
End: 2023-12-26

## 2023-12-26 RX ORDER — FUROSEMIDE 40 MG/1
40 TABLET ORAL TWICE DAILY
Qty: 180 | Refills: 3 | Status: ACTIVE | COMMUNITY
Start: 2023-10-31 | End: 1900-01-01

## 2024-01-01 NOTE — ED ADULT NURSE NOTE - IS THE PATIENT ABLE TO BE SCREENED?
Patient to ED with mother via POV for fever, runny nose since 0300. Patient mother states patient has not been breast feeding like normal; she did get him to latch prior to arrival. Mother denies a decrease in diaper changes. No v/d. Patient temp @ 100.4 rectal upon arrival to ED. No other complaints noted.   
Yes

## 2024-01-03 ENCOUNTER — APPOINTMENT (OUTPATIENT)
Dept: ELECTROPHYSIOLOGY | Facility: CLINIC | Age: 89
End: 2024-01-03
Payer: MEDICARE

## 2024-01-03 ENCOUNTER — NON-APPOINTMENT (OUTPATIENT)
Age: 89
End: 2024-01-03

## 2024-01-03 PROCEDURE — 93298 REM INTERROG DEV EVAL SCRMS: CPT

## 2024-01-04 ENCOUNTER — NON-APPOINTMENT (OUTPATIENT)
Age: 89
End: 2024-01-04

## 2024-01-05 ENCOUNTER — NON-APPOINTMENT (OUTPATIENT)
Age: 89
End: 2024-01-05

## 2024-01-08 ENCOUNTER — APPOINTMENT (OUTPATIENT)
Dept: ELECTROPHYSIOLOGY | Facility: CLINIC | Age: 89
End: 2024-01-08

## 2024-01-13 ENCOUNTER — EMERGENCY (EMERGENCY)
Facility: HOSPITAL | Age: 89
LOS: 0 days | Discharge: HOME | End: 2024-01-13
Attending: EMERGENCY MEDICINE
Payer: MEDICARE

## 2024-01-13 VITALS
SYSTOLIC BLOOD PRESSURE: 150 MMHG | DIASTOLIC BLOOD PRESSURE: 73 MMHG | TEMPERATURE: 98 F | HEART RATE: 52 BPM | OXYGEN SATURATION: 99 % | RESPIRATION RATE: 18 BRPM

## 2024-01-13 VITALS
DIASTOLIC BLOOD PRESSURE: 105 MMHG | TEMPERATURE: 99 F | OXYGEN SATURATION: 97 % | HEART RATE: 80 BPM | RESPIRATION RATE: 18 BRPM | SYSTOLIC BLOOD PRESSURE: 152 MMHG
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
## 2024-01-13 DIAGNOSIS — S09.90XA UNSPECIFIED INJURY OF HEAD, INITIAL ENCOUNTER: ICD-10-CM

## 2024-01-13 DIAGNOSIS — Z90.89 ACQUIRED ABSENCE OF OTHER ORGANS: Chronic | ICD-10-CM

## 2024-01-13 DIAGNOSIS — S00.03XA CONTUSION OF SCALP, INITIAL ENCOUNTER: ICD-10-CM

## 2024-01-13 PROCEDURE — 70450 CT HEAD/BRAIN W/O DYE: CPT | Mod: 26,MA

## 2024-01-13 PROCEDURE — 72125 CT NECK SPINE W/O DYE: CPT | Mod: 26,MA

## 2024-01-13 PROCEDURE — 99284 EMERGENCY DEPT VISIT MOD MDM: CPT | Mod: 25

## 2024-01-13 PROCEDURE — 70450 CT HEAD/BRAIN W/O DYE: CPT | Mod: MA

## 2024-01-13 PROCEDURE — 99284 EMERGENCY DEPT VISIT MOD MDM: CPT

## 2024-01-13 PROCEDURE — 72125 CT NECK SPINE W/O DYE: CPT | Mod: MA

## 2024-01-13 NOTE — ED ADULT TRIAGE NOTE - NS ED NURSE AMBULANCES
[facility]

## 2024-01-13 NOTE — ED PROVIDER NOTE - PHYSICAL EXAMINATION
Gen:  Well appearing in NAD  Head:  NCposterior parietal contusion approx 5 cm  HEENT: pupils perrl,no pharyngeal erythema, uvula midline  Cardiac: S1S2, RRR  Abd: Soft, non tender  Resp: No distress, CTA   musculoskeletal:: no deformities, no swelling, strength +5/+5  Skin: warm and dry as visualized, no rashes  Neuro: CARRILLO, aao x 4  Psych:alert, cooperative, appropriate mood and affect for situation

## 2024-01-13 NOTE — ED PROVIDER NOTE - CLINICAL SUMMARY MEDICAL DECISION MAKING FREE TEXT BOX
pt with head contusion s/p fall, will get ct head r/o ich, ct cervical spine r/o fx although no cervical spine ttp

## 2024-01-13 NOTE — ED PROVIDER NOTE - PATIENT PORTAL LINK FT
You can access the FollowMyHealth Patient Portal offered by [facility] by registering at the following website: [email or web address]. By joining [employer]’s FollowMyHealth portal, you will also be able to view your health information using other applications (apps) compatible with our system. You can access the FollowMyHealth Patient Portal offered by [facility] by registering at the following website: [email or web address]. By joining [employer]’s FollowMyHealth portal, you will also be able to view your health information using other applications (apps) compatible with our system.

## 2024-01-13 NOTE — ED PROVIDER NOTE - OBJECTIVE STATEMENT
89 yo male pw fall, pt states he lost his balance and fell over, lloyd loc.Pt has contusion to the back of his head

## 2024-01-13 NOTE — ED ADULT NURSE NOTE - OBJECTIVE STATEMENT
Pt BIBA s/p fall. Pt unable to recount how or where he fell, positive small lac on back of head with controlled bleeding and bandage. Denies pain, SOB, headache, dizziness at this time. A&O x 2 to self and situation. Unknown blood thinner use or LOC

## 2024-01-13 NOTE — ED ADULT TRIAGE NOTE - CHIEF COMPLAINT QUOTE
Pt brought in by ambulance from home s/p mechanical fall. + headstrike, No LOC. + abrasion to posterior head. Pt on AC, unsure which one. No physical complaints from patient.

## 2024-01-13 NOTE — ED PROVIDER NOTE - NSFOLLOWUPINSTRUCTIONS_ED_ALL_ED_FT
Facial or Scalp Contusion  A facial or scalp contusion is a bruise (contusion) on the face or head. Bruises happen when an injury causes bleeding under the skin. The bruise may turn blue, purple, or yellow (discoloration). Minor injuries may cause a bruise that is not painful. Some bruises are painful and swollen for a few weeks.    Injuries to the face and head usually cause a lot of swelling, especially around the eyes. You may have other injuries as well, such as broken bones or cuts.    What are the causes?  An injury to the face or head from an object.  A fall.  A hit to the face or head area.  Car accidents.  Sports injuries.  Attacks from another person (assaults).  What are the signs or symptoms?  Swelling in the area of the injury. The swelling may be in a small areas and very noticeable.  The injured area being a different color than normal.  Pain or soreness in the injured area.  If you also have broken bones, your nose might be a different shape, you may be unable to close your mouth and you might have vision changes.    How is this treated?  Applying cold compresses to the hurt area. This is often the best treatment.  Taking over-the-counter medicines to help take the pain away, if your doctor tells you to take them.  If there are any cuts, these will need to be repaired as well.  Any deeper injuries may require treatment and follow up with a specialist, such as a surgeon or eye specialist.  Follow these instructions at home:  Managing pain, stiffness, and swelling    Bag of ice on a towel on the skin.   If told, put ice on the injured area. To do this:  Put ice in a plastic bag.  Place a towel between your skin and the bag.  Leave the ice on for 20 minutes, 2–3 times a day.  Take off the ice if your skin turns bright red. This is very important. If you cannot feel pain, heat, or cold, you have a greater risk of damage to the area.  Raise the injured area above the level of your heart while you are sitting or lying down.  General instructions    Take over-the-counter and prescription medicines only as told by your doctor.  Rest as told by your doctor.  Return to your normal activities when your doctor says that it is safe.  Do not blow your nose if you have any broken bones in your face.  Eat soft foods if you are having jaw pain.  Keep all follow-up visits.  Contact a doctor if:  You have trouble biting or chewing.  Your pain or swelling gets worse.  The bruised area gets worse.  Get help right away if:  You have very bad pain or a headache, and medicine does not help.  You are very tired or confused.  Your personality changes.  You vomit.  You have a nosebleed that does not stop.  You see two of everything (double vision) or have blurry vision.  You have clear fluid coming from your nose or ear, and it does not go away.  You have problems walking or using your arms or legs.  You feel very dizzy.  Summary  A facial or scalp contusion is a bruise on the face or head.  Bruises happen when an injury causes bleeding under the skin.  Minor injuries will cause a bruise that is not painful, but worse bruises can stay painful and swollen for a few weeks.  Go to a doctor if you have problems seeing, bleeding from your face or nose, or you have trouble biting or chewing.  Applying cold compresses to the hurt area is often the best treatment.  This information is not intended to replace advice given to you by your health care provider. Make sure you discuss any questions you have with your health care provider.    Document Revised: 01/24/2022 Document Reviewed: 01/24/2022  [employer] Patient Education © 2023 [employer]  [employer] logo  Terms and Conditions  Privacy Policy  Editorial Policy  All content on this site: Copyright © 2024 [employer], its licensors, and contributors. All rights are reserved, including those for text and data mining, AI training, and similar technologies. For all open access content, the Creative Commons licensing terms apply.  Cookies are used by this site. To decline or learn more, visit our Cookies page. Facial or Scalp Contusion  A facial or scalp contusion is a bruise (contusion) on the face or head. Bruises happen when an injury causes bleeding under the skin. The bruise may turn blue, purple, or yellow (discoloration). Minor injuries may cause a bruise that is not painful. Some bruises are painful and swollen for a few weeks.    Injuries to the face and head usually cause a lot of swelling, especially around the eyes. You may have other injuries as well, such as broken bones or cuts.    What are the causes?  An injury to the face or head from an object.  A fall.  A hit to the face or head area.  Car accidents.  Sports injuries.  Attacks from another person (assaults).  What are the signs or symptoms?  Swelling in the area of the injury. The swelling may be in a small areas and very noticeable.  The injured area being a different color than normal.  Pain or soreness in the injured area.  If you also have broken bones, your nose might be a different shape, you may be unable to close your mouth and you might have vision changes.    How is this treated?  Applying cold compresses to the hurt area. This is often the best treatment.  Taking over-the-counter medicines to help take the pain away, if your doctor tells you to take them.  If there are any cuts, these will need to be repaired as well.  Any deeper injuries may require treatment and follow up with a specialist, such as a surgeon or eye specialist.  Follow these instructions at home:  Managing pain, stiffness, and swelling    Bag of ice on a towel on the skin.   If told, put ice on the injured area. To do this:  Put ice in a plastic bag.  Place a towel between your skin and the bag.  Leave the ice on for 20 minutes, 2–3 times a day.  Take off the ice if your skin turns bright red. This is very important. If you cannot feel pain, heat, or cold, you have a greater risk of damage to the area.  Raise the injured area above the level of your heart while you are sitting or lying down.  General instructions    Take over-the-counter and prescription medicines only as told by your doctor.  Rest as told by your doctor.  Return to your normal activities when your doctor says that it is safe.  Do not blow your nose if you have any broken bones in your face.  Eat soft foods if you are having jaw pain.  Keep all follow-up visits.  Contact a doctor if:  You have trouble biting or chewing.  Your pain or swelling gets worse.  The bruised area gets worse.  Get help right away if:  You have very bad pain or a headache, and medicine does not help.  You are very tired or confused.  Your personality changes.  You vomit.  You have a nosebleed that does not stop.  You see two of everything (double vision) or have blurry vision.  You have clear fluid coming from your nose or ear, and it does not go away.  You have problems walking or using your arms or legs.  You feel very dizzy.  Summary  A facial or scalp contusion is a bruise on the face or head.  Bruises happen when an injury causes bleeding under the skin.  Minor injuries will cause a bruise that is not painful, but worse bruises can stay painful and swollen for a few weeks.  Go to a doctor if you have problems seeing, bleeding from your face or nose, or you have trouble biting or chewing.  Applying cold compresses to the hurt area is often the best treatment.  This information is not intended to replace advice given to you by your health care provider. Make sure you discuss any questions you have with your health care provider.    Document Revised: 01/24/2022 Document Reviewed: 01/24/2022  [employer] Patient Education © 2023 [employer]  [employer] logo  Terms and Conditions  Privacy Policy  Editorial Policy  All content on this site: Copyright © 2024 [employer], its licensors, and contributors. All rights are reserved, including those for text and data mining, AI training, and similar technologies. For all open access content, the Creative Commons licensing terms apply.  Cookies are used by this site. To decline or learn more, visit our Cookies page.

## 2024-01-18 ENCOUNTER — APPOINTMENT (OUTPATIENT)
Dept: ELECTROPHYSIOLOGY | Facility: CLINIC | Age: 89
End: 2024-01-18

## 2024-01-24 ENCOUNTER — NON-APPOINTMENT (OUTPATIENT)
Age: 89
End: 2024-01-24

## 2024-02-07 ENCOUNTER — NON-APPOINTMENT (OUTPATIENT)
Age: 89
End: 2024-02-07

## 2024-02-07 ENCOUNTER — APPOINTMENT (OUTPATIENT)
Dept: ELECTROPHYSIOLOGY | Facility: CLINIC | Age: 89
End: 2024-02-07
Payer: MEDICARE

## 2024-02-08 ENCOUNTER — NON-APPOINTMENT (OUTPATIENT)
Age: 89
End: 2024-02-08

## 2024-02-08 PROCEDURE — 93298 REM INTERROG DEV EVAL SCRMS: CPT

## 2024-02-27 ENCOUNTER — RX RENEWAL (OUTPATIENT)
Age: 89
End: 2024-02-27

## 2024-02-28 ENCOUNTER — APPOINTMENT (OUTPATIENT)
Dept: INTERNAL MEDICINE | Facility: CLINIC | Age: 89
End: 2024-02-28
Payer: MEDICARE

## 2024-02-28 VITALS
RESPIRATION RATE: 16 BRPM | WEIGHT: 198 LBS | DIASTOLIC BLOOD PRESSURE: 76 MMHG | SYSTOLIC BLOOD PRESSURE: 124 MMHG | HEIGHT: 68 IN | HEART RATE: 80 BPM | BODY MASS INDEX: 30.01 KG/M2

## 2024-02-28 PROCEDURE — 99215 OFFICE O/P EST HI 40 MIN: CPT

## 2024-02-28 PROCEDURE — G2211 COMPLEX E/M VISIT ADD ON: CPT

## 2024-02-28 RX ORDER — APIXABAN 5 MG/1
5 TABLET, FILM COATED ORAL
Qty: 180 | Refills: 1 | Status: ACTIVE | COMMUNITY
Start: 1900-01-01 | End: 1900-01-01

## 2024-02-28 NOTE — HISTORY OF PRESENT ILLNESS
[de-identified] : Presents for follow-up and medication management. Has a history of CAD/hypertension, paroxysmal atrial fibrillation and CHF. Here today with his son and daughter.  He states that for the past week he has noted some increased shortness of breath.  There is no chest pain.  Does have some swelling in his legs which has been chronic.  He is able to lie flat in bed without difficulty.  Has gained weight over the past year.  For the most part has been compliant with his diet.

## 2024-02-28 NOTE — REVIEW OF SYSTEMS
[Recent Change In Weight] : ~T recent weight change [Lower Ext Edema] : lower extremity edema [Shortness Of Breath] : shortness of breath [Chest Pain] : no chest pain [Palpitations] : no palpitations [Orthopena] : no orthopnea [Dyspnea on Exertion] : not dyspnea on exertion [Abdominal Pain] : no abdominal pain

## 2024-02-28 NOTE — ASSESSMENT
[FreeTextEntry1] : Paroxysmal atrial fibrillation/hypertension/CHF-symptomatically he has been complaining of increased shortness of breath but on exam his lungs sound clear.  Does have lower leg swelling.  No orthopnea.  Has been having pauses detected through his loop recorder.  He is reluctant to have a pacemaker at done at this time.  He does appear comfortable presently. He is going to increase Lasix to 40 mg twice daily for the next 5-10 days based on his response. Also has not had recent follow-up with cardiology and was encouraged to make a follow-up appointment. He and his family were told if he has any significant increase in his symptoms he needs to go to the emergency room for further evaluation and treatment.

## 2024-02-28 NOTE — HISTORY OF PRESENT ILLNESS
[de-identified] : Presents for follow-up and medication management. Has a history of CAD/hypertension, paroxysmal atrial fibrillation and CHF. Here today with his son and daughter.  He states that for the past week he has noted some increased shortness of breath.  There is no chest pain.  Does have some swelling in his legs which has been chronic.  He is able to lie flat in bed without difficulty.  Has gained weight over the past year.  For the most part has been compliant with his diet.

## 2024-02-28 NOTE — REVIEW OF SYSTEMS
[Recent Change In Weight] : ~T recent weight change [Lower Ext Edema] : lower extremity edema [Shortness Of Breath] : shortness of breath [Chest Pain] : no chest pain [Orthopena] : no orthopnea [Palpitations] : no palpitations [Dyspnea on Exertion] : not dyspnea on exertion [Abdominal Pain] : no abdominal pain

## 2024-02-28 NOTE — PHYSICAL EXAM
[No Acute Distress] : no acute distress [No JVD] : no jugular venous distention [No Respiratory Distress] : no respiratory distress  [Clear to Auscultation] : lungs were clear to auscultation bilaterally [Normal Rate] : normal rate  [Regular Rhythm] : with a regular rhythm [de-identified] : 1-2+ bilateral lower leg swelling

## 2024-02-28 NOTE — PHYSICAL EXAM
[No Acute Distress] : no acute distress [No JVD] : no jugular venous distention [No Respiratory Distress] : no respiratory distress  [Clear to Auscultation] : lungs were clear to auscultation bilaterally [Normal Rate] : normal rate  [Regular Rhythm] : with a regular rhythm [de-identified] : 1-2+ bilateral lower leg swelling

## 2024-03-14 ENCOUNTER — NON-APPOINTMENT (OUTPATIENT)
Age: 89
End: 2024-03-14

## 2024-03-14 ENCOUNTER — APPOINTMENT (OUTPATIENT)
Dept: CARDIOLOGY | Facility: CLINIC | Age: 89
End: 2024-03-14
Payer: MEDICARE

## 2024-03-14 ENCOUNTER — APPOINTMENT (OUTPATIENT)
Dept: CARDIOLOGY | Facility: CLINIC | Age: 89
End: 2024-03-14

## 2024-03-14 VITALS
BODY MASS INDEX: 28.19 KG/M2 | SYSTOLIC BLOOD PRESSURE: 140 MMHG | DIASTOLIC BLOOD PRESSURE: 64 MMHG | WEIGHT: 186 LBS | HEIGHT: 68 IN | HEART RATE: 48 BPM | OXYGEN SATURATION: 99 %

## 2024-03-14 DIAGNOSIS — I10 ESSENTIAL (PRIMARY) HYPERTENSION: ICD-10-CM

## 2024-03-14 DIAGNOSIS — I50.9 HEART FAILURE, UNSPECIFIED: ICD-10-CM

## 2024-03-14 DIAGNOSIS — I48.91 UNSPECIFIED ATRIAL FIBRILLATION: ICD-10-CM

## 2024-03-14 PROCEDURE — 99214 OFFICE O/P EST MOD 30 MIN: CPT

## 2024-03-14 PROCEDURE — 93000 ELECTROCARDIOGRAM COMPLETE: CPT

## 2024-03-14 RX ORDER — TIMOLOL MALEATE 5 MG/ML
0.5 SOLUTION OPHTHALMIC
Qty: 15 | Refills: 0 | Status: DISCONTINUED | COMMUNITY
Start: 2022-09-06 | End: 2024-03-14

## 2024-03-14 RX ORDER — MULTIVITAMIN
TABLET ORAL DAILY
Refills: 0 | Status: DISCONTINUED | COMMUNITY
End: 2024-03-14

## 2024-03-14 RX ORDER — MAGNESIUM HYDROXIDE 400 MG/5ML
400 SUSPENSION ORAL
Refills: 0 | Status: DISCONTINUED | COMMUNITY
End: 2024-03-14

## 2024-03-14 RX ORDER — DORZOLAMIDE HYDROCHLORIDE 20 MG/ML
SOLUTION OPHTHALMIC TWICE DAILY
Refills: 0 | Status: ACTIVE | COMMUNITY

## 2024-03-14 RX ORDER — ENEMA 19; 7 G/133ML; G/133ML
7-19 ENEMA RECTAL
Refills: 0 | Status: DISCONTINUED | COMMUNITY
End: 2024-03-14

## 2024-03-14 RX ORDER — BISACODYL 10 MG/1
10 SUPPOSITORY RECTAL
Refills: 0 | Status: DISCONTINUED | COMMUNITY
End: 2024-03-14

## 2024-03-14 NOTE — HISTORY OF PRESENT ILLNESS
[FreeTextEntry1] : 89 year old man PMH: CHF HFrEF/ LHC 2023 revealed nonobstructive CAD, AF s/p ILR followed with EP, LE Edema, pleural/pericardial effusion, hypertension, BPH, COVID and arthritis who presents today in routine cardiac follow up, he was previously seen with his PCP d/t SOB/LE Edema which Lasix was increased to 40 MG BID SOB and LE Edema fully resolved  h/o AF/Pauses detected VIA his ILR ( 9 seconds 12/12 off all AV yousif blockers ) PPM advised patient remains reluctant an wished to further discuss with family and EP

## 2024-03-14 NOTE — DISCUSSION/SUMMARY
[EKG obtained to assist in diagnosis and management of assessed problem(s)] : EKG obtained to assist in diagnosis and management of assessed problem(s) [FreeTextEntry1] : Here today in routine cardiac follow up with above hx,   AF/Pauses: CW oral AC, will follow with EP fir further discussion regarding PPM However, at this time he remains reluctant   HFrEF: Was given an increased Couse of Lasix d/t SOB/LE Edema which symptoms have resolved, today his weight is stable, breathing comfortably, no Edema appears euvolemic Will resume Lasix 40 QD, obtain labs, repeat Echo   Consult with Heart failure team   HTN: controlled   Pericardial effusion: rather small in size. No sign of tamponade. echo to be repeated now   Follow up 2 months   Plan DW patient and family

## 2024-03-14 NOTE — PHYSICAL EXAM
[Normal] : clear lung fields, good air entry, no respiratory distress [Soft] : abdomen soft [Non Tender] : non-tender [Alert and Oriented] : alert and oriented [Moves all extremities] : moves all extremities [No Edema] : no edema [de-identified] : Overweight  [de-identified] : Irreg  [de-identified] : walker

## 2024-03-14 NOTE — CARDIOLOGY SUMMARY
[de-identified] : Impression\par   \par   Summary\par   \par   The mitral valve leaflets appear thickened.\par   Moderate (2+) mitral regurgitation ispresent.\par   Mild aortic sclerosis is present with normal valvular opening.\par   Trace aortic regurgitation is present.\par   Moderate to severe (3+) tricuspid valve regurgitation is present.\par   Severe pulmonary hypertension.\par   The left atrium is mildly dilated.\par   Left ventricle systolic function appears impaired; segmental wall motion\par   abnormalities noted. Estimated Ejection Fraction is 35- 40%.\par   Mild concentric left ventricular hypertrophy is present.\par   The IVC is dilated with decreased respiratory variation.\par    [de-identified] : 4/11/23 RSR frequent PVC's, RBBB

## 2024-03-18 ENCOUNTER — APPOINTMENT (OUTPATIENT)
Dept: ELECTROPHYSIOLOGY | Facility: CLINIC | Age: 89
End: 2024-03-18

## 2024-03-25 ENCOUNTER — NON-APPOINTMENT (OUTPATIENT)
Age: 89
End: 2024-03-25

## 2024-03-25 ENCOUNTER — RX RENEWAL (OUTPATIENT)
Age: 89
End: 2024-03-25

## 2024-03-25 ENCOUNTER — APPOINTMENT (OUTPATIENT)
Dept: OPHTHALMOLOGY | Facility: CLINIC | Age: 89
End: 2024-03-25
Payer: MEDICARE

## 2024-03-25 PROCEDURE — 92014 COMPRE OPH EXAM EST PT 1/>: CPT

## 2024-03-25 PROCEDURE — 92133 CPTRZD OPH DX IMG PST SGM ON: CPT

## 2024-03-25 RX ORDER — TERAZOSIN 10 MG/1
10 CAPSULE ORAL
Qty: 90 | Refills: 1 | Status: ACTIVE | COMMUNITY
Start: 2020-11-10 | End: 1900-01-01

## 2024-03-26 LAB
ALBUMIN SERPL ELPH-MCNC: 4.1 G/DL
ALP BLD-CCNC: 96 U/L
ALT SERPL-CCNC: 13 U/L
ANION GAP SERPL CALC-SCNC: 12 MMOL/L
AST SERPL-CCNC: 13 U/L
BILIRUB SERPL-MCNC: 0.8 MG/DL
BUN SERPL-MCNC: 35 MG/DL
CALCIUM SERPL-MCNC: 9.2 MG/DL
CHLORIDE SERPL-SCNC: 108 MMOL/L
CO2 SERPL-SCNC: 23 MMOL/L
CREAT SERPL-MCNC: 1.38 MG/DL
EGFR: 49 ML/MIN/1.73M2
GLUCOSE SERPL-MCNC: 103 MG/DL
HCT VFR BLD CALC: 32.1 %
HGB BLD-MCNC: 9.6 G/DL
MAGNESIUM SERPL-MCNC: 2.2 MG/DL
MCHC RBC-ENTMCNC: 27.8 PG
MCHC RBC-ENTMCNC: 29.9 GM/DL
MCV RBC AUTO: 93 FL
PLATELET # BLD AUTO: 149 K/UL
POTASSIUM SERPL-SCNC: 5 MMOL/L
PROT SERPL-MCNC: 6.3 G/DL
RBC # BLD: 3.45 M/UL
RBC # FLD: 15.9 %
SODIUM SERPL-SCNC: 144 MMOL/L
WBC # FLD AUTO: 3.83 K/UL

## 2024-03-29 ENCOUNTER — APPOINTMENT (OUTPATIENT)
Dept: ELECTROPHYSIOLOGY | Facility: CLINIC | Age: 89
End: 2024-03-29

## 2024-04-16 ENCOUNTER — RX RENEWAL (OUTPATIENT)
Age: 89
End: 2024-04-16

## 2024-04-16 RX ORDER — LISINOPRIL 10 MG/1
10 TABLET ORAL
Qty: 90 | Refills: 3 | Status: ACTIVE | COMMUNITY
Start: 2023-04-10 | End: 1900-01-01

## 2024-04-21 ENCOUNTER — APPOINTMENT (OUTPATIENT)
Dept: ELECTROPHYSIOLOGY | Facility: CLINIC | Age: 89
End: 2024-04-21
Payer: MEDICARE

## 2024-04-22 ENCOUNTER — NON-APPOINTMENT (OUTPATIENT)
Age: 89
End: 2024-04-22

## 2024-04-22 PROCEDURE — 93298 REM INTERROG DEV EVAL SCRMS: CPT

## 2024-05-06 ENCOUNTER — NON-APPOINTMENT (OUTPATIENT)
Age: 89
End: 2024-05-06

## 2024-05-21 ENCOUNTER — RX RENEWAL (OUTPATIENT)
Age: 89
End: 2024-05-21

## 2024-05-21 RX ORDER — ATORVASTATIN CALCIUM 20 MG/1
20 TABLET, FILM COATED ORAL
Qty: 90 | Refills: 0 | Status: ACTIVE | COMMUNITY
Start: 2023-07-09 | End: 1900-01-01

## 2024-05-21 RX ORDER — POTASSIUM CHLORIDE 1500 MG/1
20 TABLET, EXTENDED RELEASE ORAL
Qty: 90 | Refills: 0 | Status: ACTIVE | COMMUNITY
Start: 2023-07-09 | End: 1900-01-01

## 2024-05-23 ENCOUNTER — NON-APPOINTMENT (OUTPATIENT)
Age: 89
End: 2024-05-23

## 2024-05-23 ENCOUNTER — APPOINTMENT (OUTPATIENT)
Dept: ELECTROPHYSIOLOGY | Facility: CLINIC | Age: 89
End: 2024-05-23
Payer: MEDICARE

## 2024-05-23 PROCEDURE — 93298 REM INTERROG DEV EVAL SCRMS: CPT

## 2024-06-26 ENCOUNTER — APPOINTMENT (OUTPATIENT)
Dept: ELECTROPHYSIOLOGY | Facility: CLINIC | Age: 89
End: 2024-06-26

## 2024-06-27 ENCOUNTER — APPOINTMENT (OUTPATIENT)
Dept: ELECTROPHYSIOLOGY | Facility: CLINIC | Age: 89
End: 2024-06-27

## 2024-06-27 PROCEDURE — 93298 REM INTERROG DEV EVAL SCRMS: CPT

## 2024-07-17 ENCOUNTER — RX RENEWAL (OUTPATIENT)
Age: 89
End: 2024-07-17

## 2024-07-23 ENCOUNTER — RX RENEWAL (OUTPATIENT)
Age: 89
End: 2024-07-23

## 2024-08-01 ENCOUNTER — NON-APPOINTMENT (OUTPATIENT)
Age: 89
End: 2024-08-01

## 2024-08-01 ENCOUNTER — APPOINTMENT (OUTPATIENT)
Dept: OPHTHALMOLOGY | Facility: CLINIC | Age: 89
End: 2024-08-01
Payer: MEDICARE

## 2024-08-01 PROCEDURE — 92133 CPTRZD OPH DX IMG PST SGM ON: CPT

## 2024-08-01 PROCEDURE — 92014 COMPRE OPH EXAM EST PT 1/>: CPT

## 2024-08-02 ENCOUNTER — NON-APPOINTMENT (OUTPATIENT)
Age: 89
End: 2024-08-02

## 2024-08-02 ENCOUNTER — APPOINTMENT (OUTPATIENT)
Dept: ELECTROPHYSIOLOGY | Facility: CLINIC | Age: 89
End: 2024-08-02

## 2024-08-02 ENCOUNTER — APPOINTMENT (OUTPATIENT)
Dept: ELECTROPHYSIOLOGY | Facility: CLINIC | Age: 89
End: 2024-08-02
Payer: MEDICARE

## 2024-08-02 PROCEDURE — 93298 REM INTERROG DEV EVAL SCRMS: CPT

## 2024-08-14 ENCOUNTER — RX RENEWAL (OUTPATIENT)
Age: 89
End: 2024-08-14

## 2024-08-26 ENCOUNTER — NON-APPOINTMENT (OUTPATIENT)
Age: 89
End: 2024-08-26

## 2024-09-06 ENCOUNTER — NON-APPOINTMENT (OUTPATIENT)
Age: 89
End: 2024-09-06

## 2024-09-06 ENCOUNTER — APPOINTMENT (OUTPATIENT)
Dept: ELECTROPHYSIOLOGY | Facility: CLINIC | Age: 89
End: 2024-09-06

## 2024-09-06 PROCEDURE — 93298 REM INTERROG DEV EVAL SCRMS: CPT

## 2024-09-24 ENCOUNTER — RX RENEWAL (OUTPATIENT)
Age: 89
End: 2024-09-24

## 2024-10-07 ENCOUNTER — APPOINTMENT (OUTPATIENT)
Dept: ELECTROPHYSIOLOGY | Facility: CLINIC | Age: 89
End: 2024-10-07
Payer: MEDICARE

## 2024-10-07 ENCOUNTER — NON-APPOINTMENT (OUTPATIENT)
Age: 89
End: 2024-10-07

## 2024-10-07 PROCEDURE — 93298 REM INTERROG DEV EVAL SCRMS: CPT

## 2024-10-16 ENCOUNTER — RX RENEWAL (OUTPATIENT)
Age: 89
End: 2024-10-16

## 2024-10-18 ENCOUNTER — NON-APPOINTMENT (OUTPATIENT)
Age: 89
End: 2024-10-18

## 2024-10-22 ENCOUNTER — APPOINTMENT (OUTPATIENT)
Dept: OPHTHALMOLOGY | Facility: CLINIC | Age: 89
End: 2024-10-22

## 2024-11-07 ENCOUNTER — RX RENEWAL (OUTPATIENT)
Age: 89
End: 2024-11-07

## 2024-11-11 ENCOUNTER — NON-APPOINTMENT (OUTPATIENT)
Age: 89
End: 2024-11-11

## 2024-11-11 ENCOUNTER — APPOINTMENT (OUTPATIENT)
Dept: ELECTROPHYSIOLOGY | Facility: CLINIC | Age: 89
End: 2024-11-11
Payer: MEDICARE

## 2024-11-11 PROCEDURE — 93298 REM INTERROG DEV EVAL SCRMS: CPT

## 2024-11-26 ENCOUNTER — RX RENEWAL (OUTPATIENT)
Age: 89
End: 2024-11-26

## 2024-12-16 ENCOUNTER — APPOINTMENT (OUTPATIENT)
Dept: ELECTROPHYSIOLOGY | Facility: CLINIC | Age: 88
End: 2024-12-16

## 2024-12-16 PROCEDURE — 93298 REM INTERROG DEV EVAL SCRMS: CPT

## 2025-01-21 ENCOUNTER — APPOINTMENT (OUTPATIENT)
Dept: ELECTROPHYSIOLOGY | Facility: CLINIC | Age: 89
End: 2025-01-21
Payer: MEDICARE

## 2025-01-21 ENCOUNTER — NON-APPOINTMENT (OUTPATIENT)
Age: 89
End: 2025-01-21

## 2025-01-21 PROCEDURE — 93298 REM INTERROG DEV EVAL SCRMS: CPT

## 2025-01-23 ENCOUNTER — NON-APPOINTMENT (OUTPATIENT)
Age: 89
End: 2025-01-23

## 2025-01-29 ENCOUNTER — NON-APPOINTMENT (OUTPATIENT)
Age: 89
End: 2025-01-29

## 2025-02-05 ENCOUNTER — APPOINTMENT (OUTPATIENT)
Dept: OPHTHALMOLOGY | Facility: CLINIC | Age: 89
End: 2025-02-05

## 2025-02-06 ENCOUNTER — NON-APPOINTMENT (OUTPATIENT)
Age: 89
End: 2025-02-06

## 2025-02-11 ENCOUNTER — RX RENEWAL (OUTPATIENT)
Age: 89
End: 2025-02-11

## 2025-02-25 ENCOUNTER — APPOINTMENT (OUTPATIENT)
Dept: ELECTROPHYSIOLOGY | Facility: CLINIC | Age: 89
End: 2025-02-25

## 2025-03-03 ENCOUNTER — RX RENEWAL (OUTPATIENT)
Age: 89
End: 2025-03-03

## 2025-03-13 ENCOUNTER — APPOINTMENT (OUTPATIENT)
Dept: INTERNAL MEDICINE | Facility: CLINIC | Age: 89
End: 2025-03-13
Payer: MEDICARE

## 2025-03-13 VITALS
DIASTOLIC BLOOD PRESSURE: 76 MMHG | WEIGHT: 185 LBS | BODY MASS INDEX: 28.04 KG/M2 | HEIGHT: 68 IN | SYSTOLIC BLOOD PRESSURE: 126 MMHG

## 2025-03-13 VITALS
WEIGHT: 185 LBS | BODY MASS INDEX: 28.13 KG/M2 | DIASTOLIC BLOOD PRESSURE: 76 MMHG | HEART RATE: 60 BPM | SYSTOLIC BLOOD PRESSURE: 126 MMHG

## 2025-03-13 DIAGNOSIS — I50.9 HEART FAILURE, UNSPECIFIED: ICD-10-CM

## 2025-03-13 DIAGNOSIS — Z00.00 ENCOUNTER FOR GENERAL ADULT MEDICAL EXAMINATION W/OUT ABNORMAL FINDINGS: ICD-10-CM

## 2025-03-13 DIAGNOSIS — I48.91 UNSPECIFIED ATRIAL FIBRILLATION: ICD-10-CM

## 2025-03-13 DIAGNOSIS — I10 ESSENTIAL (PRIMARY) HYPERTENSION: ICD-10-CM

## 2025-03-13 DIAGNOSIS — E78.5 HYPERLIPIDEMIA, UNSPECIFIED: ICD-10-CM

## 2025-03-13 PROCEDURE — G0439: CPT

## 2025-03-13 PROCEDURE — 36415 COLL VENOUS BLD VENIPUNCTURE: CPT

## 2025-03-14 LAB
ALBUMIN SERPL ELPH-MCNC: 3.8 G/DL
ALP BLD-CCNC: 110 U/L
ALT SERPL-CCNC: 13 U/L
ANION GAP SERPL CALC-SCNC: 13 MMOL/L
AST SERPL-CCNC: 19 U/L
BASOPHILS # BLD AUTO: 0.01 K/UL
BASOPHILS NFR BLD AUTO: 0.2 %
BILIRUB SERPL-MCNC: 0.9 MG/DL
BUN SERPL-MCNC: 35 MG/DL
CALCIUM SERPL-MCNC: 9.1 MG/DL
CHLORIDE SERPL-SCNC: 105 MMOL/L
CHOLEST SERPL-MCNC: 140 MG/DL
CO2 SERPL-SCNC: 26 MMOL/L
CREAT SERPL-MCNC: 1.18 MG/DL
EGFRCR SERPLBLD CKD-EPI 2021: 58 ML/MIN/1.73M2
EOSINOPHIL # BLD AUTO: 0.19 K/UL
EOSINOPHIL NFR BLD AUTO: 3.8 %
GLUCOSE SERPL-MCNC: 111 MG/DL
HCT VFR BLD CALC: 35.5 %
HDLC SERPL-MCNC: 68 MG/DL
HGB BLD-MCNC: 10.8 G/DL
IMM GRANULOCYTES NFR BLD AUTO: 0.6 %
LDLC SERPL CALC-MCNC: 56 MG/DL
LYMPHOCYTES # BLD AUTO: 0.67 K/UL
LYMPHOCYTES NFR BLD AUTO: 13.3 %
MAN DIFF?: NORMAL
MCHC RBC-ENTMCNC: 29.6 PG
MCHC RBC-ENTMCNC: 30.4 G/DL
MCV RBC AUTO: 97.3 FL
MONOCYTES # BLD AUTO: 0.42 K/UL
MONOCYTES NFR BLD AUTO: 8.3 %
NEUTROPHILS # BLD AUTO: 3.73 K/UL
NEUTROPHILS NFR BLD AUTO: 73.8 %
NONHDLC SERPL-MCNC: 72 MG/DL
PLATELET # BLD AUTO: 156 K/UL
POTASSIUM SERPL-SCNC: 4.1 MMOL/L
PROT SERPL-MCNC: 6.2 G/DL
RBC # BLD: 3.65 M/UL
RBC # FLD: 16.4 %
SODIUM SERPL-SCNC: 144 MMOL/L
TRIGL SERPL-MCNC: 85 MG/DL
TSH SERPL-ACNC: 1.18 UIU/ML
WBC # FLD AUTO: 5.05 K/UL

## 2025-03-25 ENCOUNTER — APPOINTMENT (OUTPATIENT)
Dept: OPHTHALMOLOGY | Facility: CLINIC | Age: 89
End: 2025-03-25

## 2025-03-27 ENCOUNTER — RX RENEWAL (OUTPATIENT)
Age: 89
End: 2025-03-27

## 2025-04-15 ENCOUNTER — RX RENEWAL (OUTPATIENT)
Age: 89
End: 2025-04-15

## 2025-04-21 ENCOUNTER — NON-APPOINTMENT (OUTPATIENT)
Age: 89
End: 2025-04-21

## 2025-04-29 DIAGNOSIS — R05.9 COUGH, UNSPECIFIED: ICD-10-CM

## 2025-04-29 RX ORDER — PROMETHAZINE HYDROCHLORIDE AND DEXTROMETHORPHAN HYDROBROMIDE ORAL SOLUTION 15; 6.25 MG/5ML; MG/5ML
6.25-15 SOLUTION ORAL
Qty: 240 | Refills: 0 | Status: ACTIVE | COMMUNITY
Start: 2025-04-29 | End: 1900-01-01

## 2025-05-06 ENCOUNTER — RX RENEWAL (OUTPATIENT)
Age: 89
End: 2025-05-06

## 2025-05-31 ENCOUNTER — RX RENEWAL (OUTPATIENT)
Age: 89
End: 2025-05-31

## 2025-06-02 ENCOUNTER — APPOINTMENT (OUTPATIENT)
Dept: OPHTHALMOLOGY | Facility: CLINIC | Age: 89
End: 2025-06-02

## 2025-08-07 ENCOUNTER — INPATIENT (INPATIENT)
Facility: HOSPITAL | Age: 89
LOS: 7 days | Discharge: NURSING, REHAB OR LONG-TERM CARE | DRG: 293 | End: 2025-08-15
Attending: STUDENT IN AN ORGANIZED HEALTH CARE EDUCATION/TRAINING PROGRAM | Admitting: STUDENT IN AN ORGANIZED HEALTH CARE EDUCATION/TRAINING PROGRAM
Payer: MEDICARE

## 2025-08-07 ENCOUNTER — RESULT REVIEW (OUTPATIENT)
Age: 89
End: 2025-08-07

## 2025-08-07 VITALS
HEART RATE: 101 BPM | OXYGEN SATURATION: 99 % | SYSTOLIC BLOOD PRESSURE: 171 MMHG | DIASTOLIC BLOOD PRESSURE: 77 MMHG | RESPIRATION RATE: 26 BRPM

## 2025-08-07 DIAGNOSIS — I48.91 UNSPECIFIED ATRIAL FIBRILLATION: ICD-10-CM

## 2025-08-07 DIAGNOSIS — Z01.810 ENCOUNTER FOR PREPROCEDURAL CARDIOVASCULAR EXAMINATION: ICD-10-CM

## 2025-08-07 DIAGNOSIS — I50.23 ACUTE ON CHRONIC SYSTOLIC (CONGESTIVE) HEART FAILURE: ICD-10-CM

## 2025-08-07 DIAGNOSIS — I50.9 HEART FAILURE, UNSPECIFIED: ICD-10-CM

## 2025-08-07 DIAGNOSIS — Z90.89 ACQUIRED ABSENCE OF OTHER ORGANS: Chronic | ICD-10-CM

## 2025-08-07 LAB
ALBUMIN SERPL ELPH-MCNC: 3.8 G/DL — SIGNIFICANT CHANGE UP (ref 3.3–5.2)
ALP SERPL-CCNC: 121 U/L — HIGH (ref 40–120)
ALT FLD-CCNC: 19 U/L — SIGNIFICANT CHANGE UP
ANION GAP SERPL CALC-SCNC: 14 MMOL/L — SIGNIFICANT CHANGE UP (ref 5–17)
APTT BLD: 32.8 SEC — SIGNIFICANT CHANGE UP (ref 26.1–36.8)
AST SERPL-CCNC: 20 U/L — SIGNIFICANT CHANGE UP
BASOPHILS # BLD AUTO: 0.01 K/UL — SIGNIFICANT CHANGE UP (ref 0–0.2)
BASOPHILS # BLD MANUAL: 0.07 K/UL — SIGNIFICANT CHANGE UP (ref 0–0.2)
BASOPHILS NFR BLD AUTO: 0.1 % — SIGNIFICANT CHANGE UP (ref 0–2)
BASOPHILS NFR BLD MANUAL: 0.9 % — SIGNIFICANT CHANGE UP (ref 0–2)
BILIRUB SERPL-MCNC: 1.3 MG/DL — SIGNIFICANT CHANGE UP (ref 0.4–2)
BLD GP AB SCN SERPL QL: SIGNIFICANT CHANGE UP
BUN SERPL-MCNC: 46.1 MG/DL — HIGH (ref 8–20)
BURR CELLS BLD QL SMEAR: SLIGHT — SIGNIFICANT CHANGE UP
CALCIUM SERPL-MCNC: 9 MG/DL — SIGNIFICANT CHANGE UP (ref 8.4–10.5)
CHLORIDE SERPL-SCNC: 105 MMOL/L — SIGNIFICANT CHANGE UP (ref 96–108)
CO2 SERPL-SCNC: 24 MMOL/L — SIGNIFICANT CHANGE UP (ref 22–29)
CREAT SERPL-MCNC: 1.15 MG/DL — SIGNIFICANT CHANGE UP (ref 0.5–1.3)
EGFR: 60 ML/MIN/1.73M2 — SIGNIFICANT CHANGE UP
EGFR: 60 ML/MIN/1.73M2 — SIGNIFICANT CHANGE UP
ELLIPTOCYTES BLD QL SMEAR: ABNORMAL
EOSINOPHIL # BLD AUTO: 0.02 K/UL — SIGNIFICANT CHANGE UP (ref 0–0.5)
EOSINOPHIL # BLD MANUAL: 0 K/UL — SIGNIFICANT CHANGE UP (ref 0–0.5)
EOSINOPHIL NFR BLD AUTO: 0.3 % — SIGNIFICANT CHANGE UP (ref 0–6)
EOSINOPHIL NFR BLD MANUAL: 0 % — SIGNIFICANT CHANGE UP (ref 0–6)
GIANT PLATELETS BLD QL SMEAR: PRESENT
GLUCOSE SERPL-MCNC: 162 MG/DL — HIGH (ref 70–99)
HCT VFR BLD CALC: 35.4 % — LOW (ref 39–50)
HGB BLD-MCNC: 11 G/DL — LOW (ref 13–17)
IMM GRANULOCYTES # BLD AUTO: 0.03 K/UL — SIGNIFICANT CHANGE UP (ref 0–0.07)
IMM GRANULOCYTES NFR BLD AUTO: 0.4 % — SIGNIFICANT CHANGE UP (ref 0–0.9)
INR BLD: 1.41 RATIO — HIGH (ref 0.85–1.16)
LYMPHOCYTES # BLD AUTO: 0.46 K/UL — LOW (ref 1–3.3)
LYMPHOCYTES # BLD MANUAL: 0.34 K/UL — LOW (ref 1–3.3)
LYMPHOCYTES NFR BLD AUTO: 5.8 % — LOW (ref 13–44)
LYMPHOCYTES NFR BLD MANUAL: 4.3 % — LOW (ref 13–44)
MAGNESIUM SERPL-MCNC: 2.4 MG/DL — SIGNIFICANT CHANGE UP (ref 1.6–2.6)
MCHC RBC-ENTMCNC: 29.8 PG — SIGNIFICANT CHANGE UP (ref 27–34)
MCHC RBC-ENTMCNC: 31.1 G/DL — LOW (ref 32–36)
MCV RBC AUTO: 95.9 FL — SIGNIFICANT CHANGE UP (ref 80–100)
MONOCYTES # BLD AUTO: 0.48 K/UL — SIGNIFICANT CHANGE UP (ref 0–0.9)
MONOCYTES # BLD MANUAL: 0.21 K/UL — SIGNIFICANT CHANGE UP (ref 0–0.9)
MONOCYTES NFR BLD AUTO: 6.1 % — SIGNIFICANT CHANGE UP (ref 2–14)
MONOCYTES NFR BLD MANUAL: 2.6 % — SIGNIFICANT CHANGE UP (ref 2–14)
NEUTROPHILS # BLD AUTO: 6.89 K/UL — SIGNIFICANT CHANGE UP (ref 1.8–7.4)
NEUTROPHILS # BLD MANUAL: 7.27 K/UL — SIGNIFICANT CHANGE UP (ref 1.8–7.4)
NEUTROPHILS NFR BLD AUTO: 87.3 % — HIGH (ref 43–77)
NEUTROPHILS NFR BLD MANUAL: 92.2 % — HIGH (ref 43–77)
NRBC # BLD AUTO: 0 K/UL — SIGNIFICANT CHANGE UP (ref 0–0)
NRBC # FLD: 0 K/UL — SIGNIFICANT CHANGE UP (ref 0–0)
NRBC BLD AUTO-RTO: 0 /100 WBCS — SIGNIFICANT CHANGE UP (ref 0–0)
PHOSPHATE SERPL-MCNC: 4.1 MG/DL — SIGNIFICANT CHANGE UP (ref 2.4–4.7)
PLAT MORPH BLD: NORMAL — SIGNIFICANT CHANGE UP
PLATELET # BLD AUTO: 134 K/UL — LOW (ref 150–400)
PLATELET COUNT - ESTIMATE: NORMAL — SIGNIFICANT CHANGE UP
PMV BLD: 11.2 FL — SIGNIFICANT CHANGE UP (ref 7–13)
POLYCHROMASIA BLD QL SMEAR: SLIGHT — SIGNIFICANT CHANGE UP
POTASSIUM SERPL-MCNC: 4.5 MMOL/L — SIGNIFICANT CHANGE UP (ref 3.5–5.3)
POTASSIUM SERPL-SCNC: 4.5 MMOL/L — SIGNIFICANT CHANGE UP (ref 3.5–5.3)
PROT SERPL-MCNC: 6.8 G/DL — SIGNIFICANT CHANGE UP (ref 6.6–8.7)
PROTHROM AB SERPL-ACNC: 16.3 SEC — HIGH (ref 9.9–13.4)
RBC # BLD: 3.69 M/UL — LOW (ref 4.2–5.8)
RBC # FLD: 15 % — HIGH (ref 10.3–14.5)
RBC BLD AUTO: SIGNIFICANT CHANGE UP
SODIUM SERPL-SCNC: 143 MMOL/L — SIGNIFICANT CHANGE UP (ref 135–145)
TROPONIN T, HIGH SENSITIVITY RESULT: 105 NG/L — HIGH (ref 0–51)
TROPONIN T, HIGH SENSITIVITY RESULT: 77 NG/L — HIGH (ref 0–51)
TSH SERPL-MCNC: 0.82 UIU/ML — SIGNIFICANT CHANGE UP (ref 0.27–4.2)
WBC # BLD: 7.89 K/UL — SIGNIFICANT CHANGE UP (ref 3.8–10.5)
WBC # FLD AUTO: 7.89 K/UL — SIGNIFICANT CHANGE UP (ref 3.8–10.5)

## 2025-08-07 PROCEDURE — 85610 PROTHROMBIN TIME: CPT

## 2025-08-07 PROCEDURE — 86850 RBC ANTIBODY SCREEN: CPT

## 2025-08-07 PROCEDURE — 73502 X-RAY EXAM HIP UNI 2-3 VIEWS: CPT

## 2025-08-07 PROCEDURE — 94760 N-INVAS EAR/PLS OXIMETRY 1: CPT

## 2025-08-07 PROCEDURE — 99291 CRITICAL CARE FIRST HOUR: CPT

## 2025-08-07 PROCEDURE — 73552 X-RAY EXAM OF FEMUR 2/>: CPT

## 2025-08-07 PROCEDURE — 70498 CT ANGIOGRAPHY NECK: CPT

## 2025-08-07 PROCEDURE — 99223 1ST HOSP IP/OBS HIGH 75: CPT | Mod: 57

## 2025-08-07 PROCEDURE — 83880 ASSAY OF NATRIURETIC PEPTIDE: CPT

## 2025-08-07 PROCEDURE — 93010 ELECTROCARDIOGRAM REPORT: CPT

## 2025-08-07 PROCEDURE — 86900 BLOOD TYPING SEROLOGIC ABO: CPT

## 2025-08-07 PROCEDURE — 70496 CT ANGIOGRAPHY HEAD: CPT

## 2025-08-07 PROCEDURE — 70498 CT ANGIOGRAPHY NECK: CPT | Mod: 26

## 2025-08-07 PROCEDURE — 70450 CT HEAD/BRAIN W/O DYE: CPT | Mod: 26,XU

## 2025-08-07 PROCEDURE — 84443 ASSAY THYROID STIM HORMONE: CPT

## 2025-08-07 PROCEDURE — 36415 COLL VENOUS BLD VENIPUNCTURE: CPT

## 2025-08-07 PROCEDURE — 82962 GLUCOSE BLOOD TEST: CPT

## 2025-08-07 PROCEDURE — 94660 CPAP INITIATION&MGMT: CPT

## 2025-08-07 PROCEDURE — 0042T: CPT

## 2025-08-07 PROCEDURE — 73552 X-RAY EXAM OF FEMUR 2/>: CPT | Mod: 26,LT

## 2025-08-07 PROCEDURE — 83735 ASSAY OF MAGNESIUM: CPT

## 2025-08-07 PROCEDURE — 85025 COMPLETE CBC W/AUTO DIFF WBC: CPT

## 2025-08-07 PROCEDURE — 71045 X-RAY EXAM CHEST 1 VIEW: CPT

## 2025-08-07 PROCEDURE — 80053 COMPREHEN METABOLIC PANEL: CPT

## 2025-08-07 PROCEDURE — 86901 BLOOD TYPING SEROLOGIC RH(D): CPT

## 2025-08-07 PROCEDURE — 73502 X-RAY EXAM HIP UNI 2-3 VIEWS: CPT | Mod: 26,LT

## 2025-08-07 PROCEDURE — 70450 CT HEAD/BRAIN W/O DYE: CPT

## 2025-08-07 PROCEDURE — 84484 ASSAY OF TROPONIN QUANT: CPT

## 2025-08-07 PROCEDURE — 84100 ASSAY OF PHOSPHORUS: CPT

## 2025-08-07 PROCEDURE — 71045 X-RAY EXAM CHEST 1 VIEW: CPT | Mod: 26

## 2025-08-07 PROCEDURE — 99223 1ST HOSP IP/OBS HIGH 75: CPT

## 2025-08-07 PROCEDURE — 70496 CT ANGIOGRAPHY HEAD: CPT | Mod: 26

## 2025-08-07 PROCEDURE — 85730 THROMBOPLASTIN TIME PARTIAL: CPT

## 2025-08-07 PROCEDURE — 93306 TTE W/DOPPLER COMPLETE: CPT | Mod: 26

## 2025-08-07 RX ORDER — ACETAMINOPHEN 500 MG/5ML
1000 LIQUID (ML) ORAL ONCE
Refills: 0 | Status: COMPLETED | OUTPATIENT
Start: 2025-08-07 | End: 2025-08-07

## 2025-08-07 RX ORDER — ENOXAPARIN SODIUM 100 MG/ML
40 INJECTION SUBCUTANEOUS ONCE
Refills: 0 | Status: COMPLETED | OUTPATIENT
Start: 2025-08-07 | End: 2025-08-07

## 2025-08-07 RX ORDER — MAGNESIUM, ALUMINUM HYDROXIDE 200-200 MG
30 TABLET,CHEWABLE ORAL EVERY 4 HOURS
Refills: 0 | Status: DISCONTINUED | OUTPATIENT
Start: 2025-08-07 | End: 2025-08-08

## 2025-08-07 RX ORDER — DOXAZOSIN MESYLATE 8 MG/1
8 TABLET ORAL AT BEDTIME
Refills: 0 | Status: DISCONTINUED | OUTPATIENT
Start: 2025-08-07 | End: 2025-08-08

## 2025-08-07 RX ORDER — LISINOPRIL 5 MG/1
10 TABLET ORAL DAILY
Refills: 0 | Status: DISCONTINUED | OUTPATIENT
Start: 2025-08-07 | End: 2025-08-08

## 2025-08-07 RX ORDER — POVIDONE-IODINE 7.5 %
1 SOLUTION, NON-ORAL TOPICAL ONCE
Refills: 0 | Status: DISCONTINUED | OUTPATIENT
Start: 2025-08-07 | End: 2025-08-08

## 2025-08-07 RX ORDER — MUPIROCIN CALCIUM 20 MG/G
1 CREAM TOPICAL
Refills: 0 | Status: DISCONTINUED | OUTPATIENT
Start: 2025-08-07 | End: 2025-08-08

## 2025-08-07 RX ORDER — DORZOLAMIDE 20 MG/ML
1 SOLUTION/ DROPS OPHTHALMIC
Refills: 0 | Status: DISCONTINUED | OUTPATIENT
Start: 2025-08-07 | End: 2025-08-08

## 2025-08-07 RX ORDER — MELATONIN 5 MG
3 TABLET ORAL AT BEDTIME
Refills: 0 | Status: DISCONTINUED | OUTPATIENT
Start: 2025-08-07 | End: 2025-08-08

## 2025-08-07 RX ORDER — FUROSEMIDE 10 MG/ML
40 INJECTION INTRAMUSCULAR; INTRAVENOUS
Refills: 0 | Status: DISCONTINUED | OUTPATIENT
Start: 2025-08-07 | End: 2025-08-07

## 2025-08-07 RX ORDER — FUROSEMIDE 10 MG/ML
40 INJECTION INTRAMUSCULAR; INTRAVENOUS EVERY 12 HOURS
Refills: 0 | Status: DISCONTINUED | OUTPATIENT
Start: 2025-08-07 | End: 2025-08-08

## 2025-08-07 RX ORDER — SENNA 187 MG
2 TABLET ORAL AT BEDTIME
Refills: 0 | Status: DISCONTINUED | OUTPATIENT
Start: 2025-08-07 | End: 2025-08-08

## 2025-08-07 RX ORDER — ONDANSETRON HCL/PF 4 MG/2 ML
4 VIAL (ML) INJECTION EVERY 8 HOURS
Refills: 0 | Status: DISCONTINUED | OUTPATIENT
Start: 2025-08-07 | End: 2025-08-08

## 2025-08-07 RX ORDER — OXYCODONE HYDROCHLORIDE AND ACETAMINOPHEN 10; 325 MG/1; MG/1
1 TABLET ORAL EVERY 6 HOURS
Refills: 0 | Status: DISCONTINUED | OUTPATIENT
Start: 2025-08-07 | End: 2025-08-07

## 2025-08-07 RX ORDER — OXYCODONE HYDROCHLORIDE AND ACETAMINOPHEN 10; 325 MG/1; MG/1
2 TABLET ORAL EVERY 6 HOURS
Refills: 0 | Status: DISCONTINUED | OUTPATIENT
Start: 2025-08-07 | End: 2025-08-07

## 2025-08-07 RX ORDER — ASPIRIN 325 MG
81 TABLET ORAL AT BEDTIME
Refills: 0 | Status: DISCONTINUED | OUTPATIENT
Start: 2025-08-07 | End: 2025-08-08

## 2025-08-07 RX ORDER — ACETAMINOPHEN 500 MG/5ML
650 LIQUID (ML) ORAL EVERY 6 HOURS
Refills: 0 | Status: DISCONTINUED | OUTPATIENT
Start: 2025-08-07 | End: 2025-08-08

## 2025-08-07 RX ORDER — LATANOPROST PF 0.05 MG/ML
1 SOLUTION/ DROPS OPHTHALMIC AT BEDTIME
Refills: 0 | Status: DISCONTINUED | OUTPATIENT
Start: 2025-08-07 | End: 2025-08-08

## 2025-08-07 RX ORDER — ASPIRIN 325 MG
1 TABLET ORAL
Refills: 0 | DISCHARGE

## 2025-08-07 RX ORDER — ACETAMINOPHEN 500 MG/5ML
650 LIQUID (ML) ORAL EVERY 8 HOURS
Refills: 0 | Status: DISCONTINUED | OUTPATIENT
Start: 2025-08-07 | End: 2025-08-07

## 2025-08-07 RX ORDER — ATORVASTATIN CALCIUM 80 MG/1
1 TABLET, FILM COATED ORAL
Refills: 0 | DISCHARGE

## 2025-08-07 RX ORDER — ATORVASTATIN CALCIUM 80 MG/1
20 TABLET, FILM COATED ORAL AT BEDTIME
Refills: 0 | Status: DISCONTINUED | OUTPATIENT
Start: 2025-08-07 | End: 2025-08-08

## 2025-08-07 RX ORDER — TRANEXAMIC ACID 1000 MG/10
1000 AMPUL (ML) INTRAVENOUS ONCE
Refills: 0 | Status: COMPLETED | OUTPATIENT
Start: 2025-08-07 | End: 2025-08-07

## 2025-08-07 RX ORDER — DORZOLAMIDE 20 MG/ML
1 SOLUTION/ DROPS OPHTHALMIC
Refills: 0 | DISCHARGE

## 2025-08-07 RX ORDER — FUROSEMIDE 10 MG/ML
40 INJECTION INTRAMUSCULAR; INTRAVENOUS ONCE
Refills: 0 | Status: COMPLETED | OUTPATIENT
Start: 2025-08-07 | End: 2025-08-07

## 2025-08-07 RX ADMIN — FUROSEMIDE 40 MILLIGRAM(S): 10 INJECTION INTRAMUSCULAR; INTRAVENOUS at 21:10

## 2025-08-07 RX ADMIN — Medication 400 MILLIGRAM(S): at 13:03

## 2025-08-07 RX ADMIN — DORZOLAMIDE 1 DROP(S): 20 SOLUTION/ DROPS OPHTHALMIC at 21:36

## 2025-08-07 RX ADMIN — Medication 2 TABLET(S): at 21:10

## 2025-08-07 RX ADMIN — Medication 1 MILLIGRAM(S): at 19:15

## 2025-08-07 RX ADMIN — MUPIROCIN CALCIUM 1 APPLICATION(S): 20 CREAM TOPICAL at 21:35

## 2025-08-07 RX ADMIN — Medication 1 MILLIGRAM(S): at 22:09

## 2025-08-07 RX ADMIN — FUROSEMIDE 40 MILLIGRAM(S): 10 INJECTION INTRAMUSCULAR; INTRAVENOUS at 14:11

## 2025-08-07 RX ADMIN — ATORVASTATIN CALCIUM 20 MILLIGRAM(S): 80 TABLET, FILM COATED ORAL at 21:11

## 2025-08-07 RX ADMIN — Medication 1 MILLIGRAM(S): at 21:09

## 2025-08-07 RX ADMIN — Medication 81 MILLIGRAM(S): at 21:10

## 2025-08-07 RX ADMIN — ENOXAPARIN SODIUM 40 MILLIGRAM(S): 100 INJECTION SUBCUTANEOUS at 15:22

## 2025-08-07 RX ADMIN — Medication 1 MILLIGRAM(S): at 18:56

## 2025-08-07 RX ADMIN — LATANOPROST PF 1 DROP(S): 0.05 SOLUTION/ DROPS OPHTHALMIC at 21:36

## 2025-08-07 RX ADMIN — Medication 200 MILLIGRAM(S): at 14:52

## 2025-08-07 RX ADMIN — DOXAZOSIN MESYLATE 8 MILLIGRAM(S): 8 TABLET ORAL at 21:11

## 2025-08-07 RX ADMIN — Medication 400 MILLIGRAM(S): at 14:52

## 2025-08-08 ENCOUNTER — TRANSCRIPTION ENCOUNTER (OUTPATIENT)
Age: 89
End: 2025-08-08

## 2025-08-08 LAB
ABO RH CONFIRMATION: SIGNIFICANT CHANGE UP
ANION GAP SERPL CALC-SCNC: 15 MMOL/L — SIGNIFICANT CHANGE UP (ref 5–17)
ANION GAP SERPL CALC-SCNC: 17 MMOL/L — SIGNIFICANT CHANGE UP (ref 5–17)
BUN SERPL-MCNC: 48 MG/DL — HIGH (ref 8–20)
BUN SERPL-MCNC: 49.3 MG/DL — HIGH (ref 8–20)
CALCIUM SERPL-MCNC: 8.6 MG/DL — SIGNIFICANT CHANGE UP (ref 8.4–10.5)
CALCIUM SERPL-MCNC: 8.8 MG/DL — SIGNIFICANT CHANGE UP (ref 8.4–10.5)
CHLORIDE SERPL-SCNC: 103 MMOL/L — SIGNIFICANT CHANGE UP (ref 96–108)
CHLORIDE SERPL-SCNC: 105 MMOL/L — SIGNIFICANT CHANGE UP (ref 96–108)
CHOLEST SERPL-MCNC: 145 MG/DL — SIGNIFICANT CHANGE UP
CO2 SERPL-SCNC: 21 MMOL/L — LOW (ref 22–29)
CO2 SERPL-SCNC: 22 MMOL/L — SIGNIFICANT CHANGE UP (ref 22–29)
CREAT SERPL-MCNC: 1.18 MG/DL — SIGNIFICANT CHANGE UP (ref 0.5–1.3)
CREAT SERPL-MCNC: 1.19 MG/DL — SIGNIFICANT CHANGE UP (ref 0.5–1.3)
EGFR: 57 ML/MIN/1.73M2 — LOW
EGFR: 57 ML/MIN/1.73M2 — LOW
EGFR: 58 ML/MIN/1.73M2 — LOW
EGFR: 58 ML/MIN/1.73M2 — LOW
FERRITIN SERPL-MCNC: 151 NG/ML — SIGNIFICANT CHANGE UP (ref 30–400)
GAS PNL BLDA: SIGNIFICANT CHANGE UP
GAS PNL BLDV: SIGNIFICANT CHANGE UP
GLUCOSE BLDC GLUCOMTR-MCNC: 190 MG/DL — HIGH (ref 70–99)
GLUCOSE SERPL-MCNC: 132 MG/DL — HIGH (ref 70–99)
GLUCOSE SERPL-MCNC: 169 MG/DL — HIGH (ref 70–99)
HCT VFR BLD CALC: 29.7 % — LOW (ref 39–50)
HCT VFR BLD CALC: 32.4 % — LOW (ref 39–50)
HDLC SERPL-MCNC: 75 MG/DL — SIGNIFICANT CHANGE UP
HGB BLD-MCNC: 10.4 G/DL — LOW (ref 13–17)
HGB BLD-MCNC: 9.5 G/DL — LOW (ref 13–17)
IRON SATN MFR SERPL: 14 % — LOW (ref 16–55)
IRON SATN MFR SERPL: 46 UG/DL — LOW (ref 59–158)
LACTATE SERPL-SCNC: 1.8 MMOL/L — SIGNIFICANT CHANGE UP (ref 0.5–2)
LDLC SERPL-MCNC: 55 MG/DL — SIGNIFICANT CHANGE UP
LIPID PNL WITH DIRECT LDL SERPL: 55 MG/DL — SIGNIFICANT CHANGE UP
MAGNESIUM SERPL-MCNC: 2.2 MG/DL — SIGNIFICANT CHANGE UP (ref 1.6–2.6)
MAGNESIUM SERPL-MCNC: 2.3 MG/DL — SIGNIFICANT CHANGE UP (ref 1.6–2.6)
MCHC RBC-ENTMCNC: 29.7 PG — SIGNIFICANT CHANGE UP (ref 27–34)
MCHC RBC-ENTMCNC: 29.8 PG — SIGNIFICANT CHANGE UP (ref 27–34)
MCHC RBC-ENTMCNC: 32 G/DL — SIGNIFICANT CHANGE UP (ref 32–36)
MCHC RBC-ENTMCNC: 32.1 G/DL — SIGNIFICANT CHANGE UP (ref 32–36)
MCV RBC AUTO: 92.8 FL — SIGNIFICANT CHANGE UP (ref 80–100)
MCV RBC AUTO: 92.8 FL — SIGNIFICANT CHANGE UP (ref 80–100)
MRSA PCR RESULT.: SIGNIFICANT CHANGE UP
NONHDLC SERPL-MCNC: 70 MG/DL — SIGNIFICANT CHANGE UP
NRBC # BLD AUTO: 0 K/UL — SIGNIFICANT CHANGE UP (ref 0–0)
NRBC # BLD AUTO: 0 K/UL — SIGNIFICANT CHANGE UP (ref 0–0)
NRBC # FLD: 0 K/UL — SIGNIFICANT CHANGE UP (ref 0–0)
NRBC # FLD: 0 K/UL — SIGNIFICANT CHANGE UP (ref 0–0)
NRBC BLD AUTO-RTO: 0 /100 WBCS — SIGNIFICANT CHANGE UP (ref 0–0)
NRBC BLD AUTO-RTO: 0 /100 WBCS — SIGNIFICANT CHANGE UP (ref 0–0)
NT-PROBNP SERPL-SCNC: HIGH PG/ML (ref 0–300)
PHOSPHATE SERPL-MCNC: 4.1 MG/DL — SIGNIFICANT CHANGE UP (ref 2.4–4.7)
PHOSPHATE SERPL-MCNC: 4.8 MG/DL — HIGH (ref 2.4–4.7)
PLATELET # BLD AUTO: 103 K/UL — LOW (ref 150–400)
PLATELET # BLD AUTO: 127 K/UL — LOW (ref 150–400)
PMV BLD: 10.9 FL — SIGNIFICANT CHANGE UP (ref 7–13)
PMV BLD: 11.3 FL — SIGNIFICANT CHANGE UP (ref 7–13)
POTASSIUM SERPL-MCNC: 4.1 MMOL/L — SIGNIFICANT CHANGE UP (ref 3.5–5.3)
POTASSIUM SERPL-MCNC: 4.3 MMOL/L — SIGNIFICANT CHANGE UP (ref 3.5–5.3)
POTASSIUM SERPL-SCNC: 4.1 MMOL/L — SIGNIFICANT CHANGE UP (ref 3.5–5.3)
POTASSIUM SERPL-SCNC: 4.3 MMOL/L — SIGNIFICANT CHANGE UP (ref 3.5–5.3)
RBC # BLD: 3.2 M/UL — LOW (ref 4.2–5.8)
RBC # BLD: 3.49 M/UL — LOW (ref 4.2–5.8)
RBC # FLD: 15.3 % — HIGH (ref 10.3–14.5)
RBC # FLD: 15.3 % — HIGH (ref 10.3–14.5)
S AUREUS DNA NOSE QL NAA+PROBE: SIGNIFICANT CHANGE UP
SODIUM SERPL-SCNC: 141 MMOL/L — SIGNIFICANT CHANGE UP (ref 135–145)
SODIUM SERPL-SCNC: 142 MMOL/L — SIGNIFICANT CHANGE UP (ref 135–145)
TIBC SERPL-MCNC: 329 UG/DL — SIGNIFICANT CHANGE UP (ref 220–430)
TRANSFERRIN SERPL-MCNC: 230 MG/DL — SIGNIFICANT CHANGE UP (ref 180–329)
TRIGL SERPL-MCNC: 80 MG/DL — SIGNIFICANT CHANGE UP
TROPONIN T, HIGH SENSITIVITY RESULT: 111 NG/L — HIGH (ref 0–51)
WBC # BLD: 6.27 K/UL — SIGNIFICANT CHANGE UP (ref 3.8–10.5)
WBC # BLD: 7.68 K/UL — SIGNIFICANT CHANGE UP (ref 3.8–10.5)
WBC # FLD AUTO: 6.27 K/UL — SIGNIFICANT CHANGE UP (ref 3.8–10.5)
WBC # FLD AUTO: 7.68 K/UL — SIGNIFICANT CHANGE UP (ref 3.8–10.5)

## 2025-08-08 PROCEDURE — 99223 1ST HOSP IP/OBS HIGH 75: CPT | Mod: GC

## 2025-08-08 PROCEDURE — 85027 COMPLETE CBC AUTOMATED: CPT

## 2025-08-08 PROCEDURE — 99233 SBSQ HOSP IP/OBS HIGH 50: CPT

## 2025-08-08 PROCEDURE — 80048 BASIC METABOLIC PNL TOTAL CA: CPT

## 2025-08-08 PROCEDURE — 85730 THROMBOPLASTIN TIME PARTIAL: CPT

## 2025-08-08 PROCEDURE — 70498 CT ANGIOGRAPHY NECK: CPT

## 2025-08-08 PROCEDURE — 73502 X-RAY EXAM HIP UNI 2-3 VIEWS: CPT

## 2025-08-08 PROCEDURE — 84484 ASSAY OF TROPONIN QUANT: CPT

## 2025-08-08 PROCEDURE — 83880 ASSAY OF NATRIURETIC PEPTIDE: CPT

## 2025-08-08 PROCEDURE — 83550 IRON BINDING TEST: CPT

## 2025-08-08 PROCEDURE — 80053 COMPREHEN METABOLIC PANEL: CPT

## 2025-08-08 PROCEDURE — 85610 PROTHROMBIN TIME: CPT

## 2025-08-08 PROCEDURE — 84100 ASSAY OF PHOSPHORUS: CPT

## 2025-08-08 PROCEDURE — 85018 HEMOGLOBIN: CPT

## 2025-08-08 PROCEDURE — 94640 AIRWAY INHALATION TREATMENT: CPT

## 2025-08-08 PROCEDURE — 85025 COMPLETE CBC W/AUTO DIFF WBC: CPT

## 2025-08-08 PROCEDURE — 70450 CT HEAD/BRAIN W/O DYE: CPT

## 2025-08-08 PROCEDURE — 83735 ASSAY OF MAGNESIUM: CPT

## 2025-08-08 PROCEDURE — 71045 X-RAY EXAM CHEST 1 VIEW: CPT | Mod: 26

## 2025-08-08 PROCEDURE — 94660 CPAP INITIATION&MGMT: CPT

## 2025-08-08 PROCEDURE — 87641 MR-STAPH DNA AMP PROBE: CPT

## 2025-08-08 PROCEDURE — 82728 ASSAY OF FERRITIN: CPT

## 2025-08-08 PROCEDURE — 36415 COLL VENOUS BLD VENIPUNCTURE: CPT

## 2025-08-08 PROCEDURE — 84132 ASSAY OF SERUM POTASSIUM: CPT

## 2025-08-08 PROCEDURE — 80061 LIPID PANEL: CPT

## 2025-08-08 PROCEDURE — 70496 CT ANGIOGRAPHY HEAD: CPT

## 2025-08-08 PROCEDURE — 71045 X-RAY EXAM CHEST 1 VIEW: CPT

## 2025-08-08 PROCEDURE — 93010 ELECTROCARDIOGRAM REPORT: CPT

## 2025-08-08 PROCEDURE — 82947 ASSAY GLUCOSE BLOOD QUANT: CPT

## 2025-08-08 PROCEDURE — 86850 RBC ANTIBODY SCREEN: CPT

## 2025-08-08 PROCEDURE — 99223 1ST HOSP IP/OBS HIGH 75: CPT

## 2025-08-08 PROCEDURE — 84443 ASSAY THYROID STIM HORMONE: CPT

## 2025-08-08 PROCEDURE — 82435 ASSAY OF BLOOD CHLORIDE: CPT

## 2025-08-08 PROCEDURE — 82330 ASSAY OF CALCIUM: CPT

## 2025-08-08 PROCEDURE — 73552 X-RAY EXAM OF FEMUR 2/>: CPT

## 2025-08-08 PROCEDURE — 87640 STAPH A DNA AMP PROBE: CPT

## 2025-08-08 PROCEDURE — 0042T: CPT

## 2025-08-08 PROCEDURE — 83605 ASSAY OF LACTIC ACID: CPT

## 2025-08-08 PROCEDURE — 85014 HEMATOCRIT: CPT

## 2025-08-08 PROCEDURE — 84466 ASSAY OF TRANSFERRIN: CPT

## 2025-08-08 PROCEDURE — C8929: CPT

## 2025-08-08 PROCEDURE — 94760 N-INVAS EAR/PLS OXIMETRY 1: CPT

## 2025-08-08 PROCEDURE — 84295 ASSAY OF SERUM SODIUM: CPT

## 2025-08-08 PROCEDURE — 27495 REINFORCE THIGH: CPT | Mod: LT

## 2025-08-08 PROCEDURE — 86901 BLOOD TYPING SEROLOGIC RH(D): CPT

## 2025-08-08 PROCEDURE — 27236 TREAT THIGH FRACTURE: CPT | Mod: LT

## 2025-08-08 PROCEDURE — 83540 ASSAY OF IRON: CPT

## 2025-08-08 PROCEDURE — 86923 COMPATIBILITY TEST ELECTRIC: CPT

## 2025-08-08 PROCEDURE — 82962 GLUCOSE BLOOD TEST: CPT

## 2025-08-08 PROCEDURE — 86900 BLOOD TYPING SEROLOGIC ABO: CPT

## 2025-08-08 PROCEDURE — 73502 X-RAY EXAM HIP UNI 2-3 VIEWS: CPT | Mod: 26,LT

## 2025-08-08 PROCEDURE — 99232 SBSQ HOSP IP/OBS MODERATE 35: CPT

## 2025-08-08 PROCEDURE — 82803 BLOOD GASES ANY COMBINATION: CPT

## 2025-08-08 DEVICE — IMPLANTABLE DEVICE: Type: IMPLANTABLE DEVICE | Site: LEFT | Status: FUNCTIONAL

## 2025-08-08 DEVICE — CEMENT PALACOS R: Type: IMPLANTABLE DEVICE | Site: LEFT | Status: FUNCTIONAL

## 2025-08-08 DEVICE — ASBLY CBL CBL-RDY 1.8X910MM: Type: IMPLANTABLE DEVICE | Site: LEFT | Status: FUNCTIONAL

## 2025-08-08 DEVICE — KIT A-LINE 1LUM 20G X 12CM SAFE KIT: Type: IMPLANTABLE DEVICE | Site: LEFT | Status: FUNCTIONAL

## 2025-08-08 RX ORDER — CALCIUM GLUCONATE 20 MG/ML
1 INJECTION, SOLUTION INTRAVENOUS ONCE
Refills: 0 | Status: COMPLETED | OUTPATIENT
Start: 2025-08-08 | End: 2025-08-08

## 2025-08-08 RX ORDER — APIXABAN 5 MG/1
5 TABLET, FILM COATED ORAL
Refills: 0 | Status: DISCONTINUED | OUTPATIENT
Start: 2025-08-09 | End: 2025-08-10

## 2025-08-08 RX ORDER — MAGNESIUM, ALUMINUM HYDROXIDE 200-200 MG
30 TABLET,CHEWABLE ORAL EVERY 4 HOURS
Refills: 0 | Status: DISCONTINUED | OUTPATIENT
Start: 2025-08-08 | End: 2025-08-15

## 2025-08-08 RX ORDER — IPRATROPIUM BROMIDE AND ALBUTEROL SULFATE .5; 2.5 MG/3ML; MG/3ML
3 SOLUTION RESPIRATORY (INHALATION) ONCE
Refills: 0 | Status: COMPLETED | OUTPATIENT
Start: 2025-08-08 | End: 2025-08-08

## 2025-08-08 RX ORDER — POLYETHYLENE GLYCOL 3350 17 G/17G
17 POWDER, FOR SOLUTION ORAL DAILY
Refills: 0 | Status: DISCONTINUED | OUTPATIENT
Start: 2025-08-08 | End: 2025-08-15

## 2025-08-08 RX ORDER — APIXABAN 5 MG/1
5 TABLET, FILM COATED ORAL EVERY 12 HOURS
Refills: 0 | Status: DISCONTINUED | OUTPATIENT
Start: 2025-08-08 | End: 2025-08-08

## 2025-08-08 RX ORDER — SODIUM CHLORIDE 9 G/1000ML
1000 INJECTION, SOLUTION INTRAVENOUS
Refills: 0 | Status: DISCONTINUED | OUTPATIENT
Start: 2025-08-08 | End: 2025-08-08

## 2025-08-08 RX ORDER — LATANOPROST PF 0.05 MG/ML
1 SOLUTION/ DROPS OPHTHALMIC AT BEDTIME
Refills: 0 | Status: DISCONTINUED | OUTPATIENT
Start: 2025-08-08 | End: 2025-08-15

## 2025-08-08 RX ORDER — OXYCODONE HYDROCHLORIDE 30 MG/1
5 TABLET ORAL
Refills: 0 | Status: DISCONTINUED | OUTPATIENT
Start: 2025-08-08 | End: 2025-08-08

## 2025-08-08 RX ORDER — LISINOPRIL 5 MG/1
10 TABLET ORAL DAILY
Refills: 0 | Status: DISCONTINUED | OUTPATIENT
Start: 2025-08-08 | End: 2025-08-08

## 2025-08-08 RX ORDER — FENTANYL CITRATE-0.9 % NACL/PF 100MCG/2ML
25 SYRINGE (ML) INTRAVENOUS
Refills: 0 | Status: DISCONTINUED | OUTPATIENT
Start: 2025-08-08 | End: 2025-08-08

## 2025-08-08 RX ORDER — TRAMADOL HYDROCHLORIDE 50 MG/1
50 TABLET, FILM COATED ORAL
Refills: 0 | Status: DISCONTINUED | OUTPATIENT
Start: 2025-08-08 | End: 2025-08-15

## 2025-08-08 RX ORDER — CEFAZOLIN SODIUM IN 0.9 % NACL 3 G/100 ML
2000 INTRAVENOUS SOLUTION, PIGGYBACK (ML) INTRAVENOUS
Refills: 0 | Status: COMPLETED | OUTPATIENT
Start: 2025-08-08 | End: 2025-08-09

## 2025-08-08 RX ORDER — ASPIRIN 325 MG
81 TABLET ORAL AT BEDTIME
Refills: 0 | Status: DISCONTINUED | OUTPATIENT
Start: 2025-08-08 | End: 2025-08-15

## 2025-08-08 RX ORDER — TRANEXAMIC ACID 1000 MG/10
1000 AMPUL (ML) INTRAVENOUS ONCE
Refills: 0 | Status: DISCONTINUED | OUTPATIENT
Start: 2025-08-08 | End: 2025-08-08

## 2025-08-08 RX ORDER — FUROSEMIDE 10 MG/ML
20 INJECTION INTRAMUSCULAR; INTRAVENOUS ONCE
Refills: 0 | Status: COMPLETED | OUTPATIENT
Start: 2025-08-08 | End: 2025-08-08

## 2025-08-08 RX ORDER — ATORVASTATIN CALCIUM 80 MG/1
20 TABLET, FILM COATED ORAL AT BEDTIME
Refills: 0 | Status: DISCONTINUED | OUTPATIENT
Start: 2025-08-08 | End: 2025-08-15

## 2025-08-08 RX ORDER — FENTANYL CITRATE-0.9 % NACL/PF 100MCG/2ML
12.5 SYRINGE (ML) INTRAVENOUS
Refills: 0 | Status: DISCONTINUED | OUTPATIENT
Start: 2025-08-08 | End: 2025-08-08

## 2025-08-08 RX ORDER — OXYCODONE HYDROCHLORIDE 30 MG/1
10 TABLET ORAL
Refills: 0 | Status: DISCONTINUED | OUTPATIENT
Start: 2025-08-08 | End: 2025-08-08

## 2025-08-08 RX ORDER — FUROSEMIDE 10 MG/ML
40 INJECTION INTRAMUSCULAR; INTRAVENOUS EVERY 12 HOURS
Refills: 0 | Status: DISCONTINUED | OUTPATIENT
Start: 2025-08-08 | End: 2025-08-09

## 2025-08-08 RX ORDER — CEFAZOLIN SODIUM IN 0.9 % NACL 3 G/100 ML
2000 INTRAVENOUS SOLUTION, PIGGYBACK (ML) INTRAVENOUS ONCE
Refills: 0 | Status: DISCONTINUED | OUTPATIENT
Start: 2025-08-08 | End: 2025-08-08

## 2025-08-08 RX ORDER — SENNA 187 MG
2 TABLET ORAL AT BEDTIME
Refills: 0 | Status: DISCONTINUED | OUTPATIENT
Start: 2025-08-08 | End: 2025-08-15

## 2025-08-08 RX ORDER — FERROUS SULFATE 137(45) MG
325 TABLET, EXTENDED RELEASE ORAL DAILY
Refills: 0 | Status: DISCONTINUED | OUTPATIENT
Start: 2025-08-08 | End: 2025-08-08

## 2025-08-08 RX ORDER — ONDANSETRON HCL/PF 4 MG/2 ML
4 VIAL (ML) INJECTION ONCE
Refills: 0 | Status: DISCONTINUED | OUTPATIENT
Start: 2025-08-08 | End: 2025-08-08

## 2025-08-08 RX ORDER — ACETAMINOPHEN 500 MG/5ML
650 LIQUID (ML) ORAL EVERY 8 HOURS
Refills: 0 | Status: DISCONTINUED | OUTPATIENT
Start: 2025-08-08 | End: 2025-08-15

## 2025-08-08 RX ORDER — FERROUS SULFATE 137(45) MG
325 TABLET, EXTENDED RELEASE ORAL DAILY
Refills: 0 | Status: DISCONTINUED | OUTPATIENT
Start: 2025-08-08 | End: 2025-08-15

## 2025-08-08 RX ORDER — MELATONIN 5 MG
3 TABLET ORAL AT BEDTIME
Refills: 0 | Status: DISCONTINUED | OUTPATIENT
Start: 2025-08-08 | End: 2025-08-15

## 2025-08-08 RX ORDER — POLYETHYLENE GLYCOL 3350 17 G/17G
17 POWDER, FOR SOLUTION ORAL DAILY
Refills: 0 | Status: DISCONTINUED | OUTPATIENT
Start: 2025-08-08 | End: 2025-08-08

## 2025-08-08 RX ORDER — DOXAZOSIN MESYLATE 8 MG/1
8 TABLET ORAL AT BEDTIME
Refills: 0 | Status: DISCONTINUED | OUTPATIENT
Start: 2025-08-08 | End: 2025-08-15

## 2025-08-08 RX ORDER — DORZOLAMIDE 20 MG/ML
1 SOLUTION/ DROPS OPHTHALMIC
Refills: 0 | Status: DISCONTINUED | OUTPATIENT
Start: 2025-08-08 | End: 2025-08-15

## 2025-08-08 RX ADMIN — FUROSEMIDE 40 MILLIGRAM(S): 10 INJECTION INTRAMUSCULAR; INTRAVENOUS at 21:52

## 2025-08-08 RX ADMIN — CALCIUM GLUCONATE 100 GRAM(S): 20 INJECTION, SOLUTION INTRAVENOUS at 14:50

## 2025-08-08 RX ADMIN — FUROSEMIDE 40 MILLIGRAM(S): 10 INJECTION INTRAMUSCULAR; INTRAVENOUS at 09:01

## 2025-08-08 RX ADMIN — LISINOPRIL 10 MILLIGRAM(S): 5 TABLET ORAL at 05:44

## 2025-08-08 RX ADMIN — DOXAZOSIN MESYLATE 8 MILLIGRAM(S): 8 TABLET ORAL at 21:57

## 2025-08-08 RX ADMIN — Medication 1 MILLIGRAM(S): at 08:53

## 2025-08-08 RX ADMIN — SODIUM CHLORIDE 75 MILLILITER(S): 9 INJECTION, SOLUTION INTRAVENOUS at 18:11

## 2025-08-08 RX ADMIN — Medication 81 MILLIGRAM(S): at 21:53

## 2025-08-08 RX ADMIN — IPRATROPIUM BROMIDE AND ALBUTEROL SULFATE 3 MILLILITER(S): .5; 2.5 SOLUTION RESPIRATORY (INHALATION) at 13:05

## 2025-08-08 RX ADMIN — Medication 650 MILLIGRAM(S): at 21:52

## 2025-08-08 RX ADMIN — Medication 2000 MILLIGRAM(S): at 18:11

## 2025-08-08 RX ADMIN — Medication 1 MILLIGRAM(S): at 09:20

## 2025-08-08 RX ADMIN — DORZOLAMIDE 1 DROP(S): 20 SOLUTION/ DROPS OPHTHALMIC at 21:53

## 2025-08-08 RX ADMIN — LATANOPROST PF 1 DROP(S): 0.05 SOLUTION/ DROPS OPHTHALMIC at 21:54

## 2025-08-08 RX ADMIN — Medication 1 MILLIGRAM(S): at 02:08

## 2025-08-08 RX ADMIN — MUPIROCIN CALCIUM 1 APPLICATION(S): 20 CREAM TOPICAL at 05:44

## 2025-08-08 RX ADMIN — ATORVASTATIN CALCIUM 20 MILLIGRAM(S): 80 TABLET, FILM COATED ORAL at 21:53

## 2025-08-08 RX ADMIN — Medication 2 TABLET(S): at 21:53

## 2025-08-08 RX ADMIN — Medication 1 APPLICATION(S): at 05:44

## 2025-08-08 RX ADMIN — FUROSEMIDE 20 MILLIGRAM(S): 10 INJECTION INTRAMUSCULAR; INTRAVENOUS at 16:58

## 2025-08-08 RX ADMIN — Medication 1 MILLIGRAM(S): at 03:08

## 2025-08-09 LAB
ALBUMIN SERPL ELPH-MCNC: 3.5 G/DL — SIGNIFICANT CHANGE UP (ref 3.3–5.2)
ALP SERPL-CCNC: 93 U/L — SIGNIFICANT CHANGE UP (ref 40–120)
ALT FLD-CCNC: 15 U/L — SIGNIFICANT CHANGE UP
ANION GAP SERPL CALC-SCNC: 15 MMOL/L — SIGNIFICANT CHANGE UP (ref 5–17)
AST SERPL-CCNC: 40 U/L — HIGH
BASE EXCESS BLDA CALC-SCNC: 2.3 MMOL/L — SIGNIFICANT CHANGE UP (ref -2–3)
BILIRUB DIRECT SERPL-MCNC: 0.5 MG/DL — HIGH (ref 0–0.3)
BILIRUB INDIRECT FLD-MCNC: 0.7 MG/DL — SIGNIFICANT CHANGE UP (ref 0.2–1)
BILIRUB SERPL-MCNC: 1.2 MG/DL — SIGNIFICANT CHANGE UP (ref 0.4–2)
BLOOD GAS COMMENTS ARTERIAL: SIGNIFICANT CHANGE UP
BUN SERPL-MCNC: 57.8 MG/DL — HIGH (ref 8–20)
CALCIUM SERPL-MCNC: 8.6 MG/DL — SIGNIFICANT CHANGE UP (ref 8.4–10.5)
CHLORIDE SERPL-SCNC: 108 MMOL/L — SIGNIFICANT CHANGE UP (ref 96–108)
CO2 SERPL-SCNC: 23 MMOL/L — SIGNIFICANT CHANGE UP (ref 22–29)
CREAT SERPL-MCNC: 1.65 MG/DL — HIGH (ref 0.5–1.3)
EGFR: 39 ML/MIN/1.73M2 — LOW
EGFR: 39 ML/MIN/1.73M2 — LOW
GAS PNL BLDA: SIGNIFICANT CHANGE UP
GLUCOSE SERPL-MCNC: 132 MG/DL — HIGH (ref 70–99)
HCO3 BLDA-SCNC: 26 MMOL/L — SIGNIFICANT CHANGE UP (ref 21–28)
HCT VFR BLD CALC: 31.2 % — LOW (ref 39–50)
HGB BLD-MCNC: 10.2 G/DL — LOW (ref 13–17)
HOROWITZ INDEX BLDA+IHG-RTO: SIGNIFICANT CHANGE UP
MAGNESIUM SERPL-MCNC: 2.5 MG/DL — SIGNIFICANT CHANGE UP (ref 1.6–2.6)
MCHC RBC-ENTMCNC: 29.4 PG — SIGNIFICANT CHANGE UP (ref 27–34)
MCHC RBC-ENTMCNC: 32.7 G/DL — SIGNIFICANT CHANGE UP (ref 32–36)
MCV RBC AUTO: 89.9 FL — SIGNIFICANT CHANGE UP (ref 80–100)
NRBC # BLD AUTO: 0 K/UL — SIGNIFICANT CHANGE UP (ref 0–0)
NRBC # FLD: 0 K/UL — SIGNIFICANT CHANGE UP (ref 0–0)
NRBC BLD AUTO-RTO: 0 /100 WBCS — SIGNIFICANT CHANGE UP (ref 0–0)
PCO2 BLDA: 39 MMHG — SIGNIFICANT CHANGE UP (ref 35–48)
PH BLDA: 7.44 — SIGNIFICANT CHANGE UP (ref 7.35–7.45)
PHOSPHATE SERPL-MCNC: 5.6 MG/DL — HIGH (ref 2.4–4.7)
PLATELET # BLD AUTO: 111 K/UL — LOW (ref 150–400)
PMV BLD: 11.2 FL — SIGNIFICANT CHANGE UP (ref 7–13)
PO2 BLDA: 81 MMHG — LOW (ref 83–108)
POTASSIUM SERPL-MCNC: 4.4 MMOL/L — SIGNIFICANT CHANGE UP (ref 3.5–5.3)
POTASSIUM SERPL-SCNC: 4.4 MMOL/L — SIGNIFICANT CHANGE UP (ref 3.5–5.3)
PROT SERPL-MCNC: 5.9 G/DL — LOW (ref 6.6–8.7)
RBC # BLD: 3.47 M/UL — LOW (ref 4.2–5.8)
RBC # FLD: 17.8 % — HIGH (ref 10.3–14.5)
SAO2 % BLDA: 98.3 % — HIGH (ref 94–98)
SODIUM SERPL-SCNC: 146 MMOL/L — HIGH (ref 135–145)
WBC # BLD: 6.55 K/UL — SIGNIFICANT CHANGE UP (ref 3.8–10.5)
WBC # FLD AUTO: 6.55 K/UL — SIGNIFICANT CHANGE UP (ref 3.8–10.5)

## 2025-08-09 PROCEDURE — 0042T: CPT

## 2025-08-09 PROCEDURE — 94660 CPAP INITIATION&MGMT: CPT

## 2025-08-09 PROCEDURE — 87640 STAPH A DNA AMP PROBE: CPT

## 2025-08-09 PROCEDURE — 85730 THROMBOPLASTIN TIME PARTIAL: CPT

## 2025-08-09 PROCEDURE — 99232 SBSQ HOSP IP/OBS MODERATE 35: CPT

## 2025-08-09 PROCEDURE — 82962 GLUCOSE BLOOD TEST: CPT

## 2025-08-09 PROCEDURE — 94640 AIRWAY INHALATION TREATMENT: CPT

## 2025-08-09 PROCEDURE — 80076 HEPATIC FUNCTION PANEL: CPT

## 2025-08-09 PROCEDURE — 83605 ASSAY OF LACTIC ACID: CPT

## 2025-08-09 PROCEDURE — 85025 COMPLETE CBC W/AUTO DIFF WBC: CPT

## 2025-08-09 PROCEDURE — 82947 ASSAY GLUCOSE BLOOD QUANT: CPT

## 2025-08-09 PROCEDURE — P9016: CPT

## 2025-08-09 PROCEDURE — 84466 ASSAY OF TRANSFERRIN: CPT

## 2025-08-09 PROCEDURE — 84484 ASSAY OF TROPONIN QUANT: CPT

## 2025-08-09 PROCEDURE — 85610 PROTHROMBIN TIME: CPT

## 2025-08-09 PROCEDURE — 71045 X-RAY EXAM CHEST 1 VIEW: CPT

## 2025-08-09 PROCEDURE — 82728 ASSAY OF FERRITIN: CPT

## 2025-08-09 PROCEDURE — 86900 BLOOD TYPING SEROLOGIC ABO: CPT

## 2025-08-09 PROCEDURE — 83550 IRON BINDING TEST: CPT

## 2025-08-09 PROCEDURE — 83735 ASSAY OF MAGNESIUM: CPT

## 2025-08-09 PROCEDURE — C8929: CPT

## 2025-08-09 PROCEDURE — 83540 ASSAY OF IRON: CPT

## 2025-08-09 PROCEDURE — 73502 X-RAY EXAM HIP UNI 2-3 VIEWS: CPT

## 2025-08-09 PROCEDURE — 80048 BASIC METABOLIC PNL TOTAL CA: CPT

## 2025-08-09 PROCEDURE — 82435 ASSAY OF BLOOD CHLORIDE: CPT

## 2025-08-09 PROCEDURE — 84132 ASSAY OF SERUM POTASSIUM: CPT

## 2025-08-09 PROCEDURE — 82803 BLOOD GASES ANY COMBINATION: CPT

## 2025-08-09 PROCEDURE — 85014 HEMATOCRIT: CPT

## 2025-08-09 PROCEDURE — 87641 MR-STAPH DNA AMP PROBE: CPT

## 2025-08-09 PROCEDURE — 80061 LIPID PANEL: CPT

## 2025-08-09 PROCEDURE — 86901 BLOOD TYPING SEROLOGIC RH(D): CPT

## 2025-08-09 PROCEDURE — 36415 COLL VENOUS BLD VENIPUNCTURE: CPT

## 2025-08-09 PROCEDURE — 85027 COMPLETE CBC AUTOMATED: CPT

## 2025-08-09 PROCEDURE — 84100 ASSAY OF PHOSPHORUS: CPT

## 2025-08-09 PROCEDURE — 99233 SBSQ HOSP IP/OBS HIGH 50: CPT

## 2025-08-09 PROCEDURE — 86850 RBC ANTIBODY SCREEN: CPT

## 2025-08-09 PROCEDURE — 70496 CT ANGIOGRAPHY HEAD: CPT

## 2025-08-09 PROCEDURE — 85018 HEMOGLOBIN: CPT

## 2025-08-09 PROCEDURE — 70498 CT ANGIOGRAPHY NECK: CPT

## 2025-08-09 PROCEDURE — 80053 COMPREHEN METABOLIC PANEL: CPT

## 2025-08-09 PROCEDURE — 70450 CT HEAD/BRAIN W/O DYE: CPT

## 2025-08-09 PROCEDURE — 82330 ASSAY OF CALCIUM: CPT

## 2025-08-09 PROCEDURE — 86923 COMPATIBILITY TEST ELECTRIC: CPT

## 2025-08-09 PROCEDURE — 93005 ELECTROCARDIOGRAM TRACING: CPT

## 2025-08-09 PROCEDURE — 84443 ASSAY THYROID STIM HORMONE: CPT

## 2025-08-09 PROCEDURE — 84295 ASSAY OF SERUM SODIUM: CPT

## 2025-08-09 PROCEDURE — 83880 ASSAY OF NATRIURETIC PEPTIDE: CPT

## 2025-08-09 PROCEDURE — 94760 N-INVAS EAR/PLS OXIMETRY 1: CPT

## 2025-08-09 PROCEDURE — 73552 X-RAY EXAM OF FEMUR 2/>: CPT

## 2025-08-09 RX ADMIN — Medication 650 MILLIGRAM(S): at 22:46

## 2025-08-09 RX ADMIN — Medication 2000 MILLIGRAM(S): at 02:00

## 2025-08-09 RX ADMIN — Medication 650 MILLIGRAM(S): at 13:25

## 2025-08-09 RX ADMIN — DOXAZOSIN MESYLATE 8 MILLIGRAM(S): 8 TABLET ORAL at 22:46

## 2025-08-09 RX ADMIN — DORZOLAMIDE 1 DROP(S): 20 SOLUTION/ DROPS OPHTHALMIC at 22:45

## 2025-08-09 RX ADMIN — Medication 81 MILLIGRAM(S): at 22:46

## 2025-08-09 RX ADMIN — LATANOPROST PF 1 DROP(S): 0.05 SOLUTION/ DROPS OPHTHALMIC at 22:46

## 2025-08-09 RX ADMIN — APIXABAN 5 MILLIGRAM(S): 5 TABLET, FILM COATED ORAL at 18:14

## 2025-08-09 RX ADMIN — DORZOLAMIDE 1 DROP(S): 20 SOLUTION/ DROPS OPHTHALMIC at 08:19

## 2025-08-09 RX ADMIN — ATORVASTATIN CALCIUM 20 MILLIGRAM(S): 80 TABLET, FILM COATED ORAL at 22:46

## 2025-08-09 RX ADMIN — Medication 325 MILLIGRAM(S): at 13:25

## 2025-08-09 RX ADMIN — Medication 650 MILLIGRAM(S): at 05:49

## 2025-08-09 RX ADMIN — Medication 2 TABLET(S): at 22:46

## 2025-08-09 RX ADMIN — Medication 1 APPLICATION(S): at 05:49

## 2025-08-09 RX ADMIN — POLYETHYLENE GLYCOL 3350 17 GRAM(S): 17 POWDER, FOR SOLUTION ORAL at 13:26

## 2025-08-09 RX ADMIN — APIXABAN 5 MILLIGRAM(S): 5 TABLET, FILM COATED ORAL at 05:49

## 2025-08-09 RX ADMIN — FUROSEMIDE 40 MILLIGRAM(S): 10 INJECTION INTRAMUSCULAR; INTRAVENOUS at 05:49

## 2025-08-10 LAB
ANION GAP SERPL CALC-SCNC: 15 MMOL/L — SIGNIFICANT CHANGE UP (ref 5–17)
BUN SERPL-MCNC: 69.3 MG/DL — HIGH (ref 8–20)
CALCIUM SERPL-MCNC: 8.2 MG/DL — LOW (ref 8.4–10.5)
CHLORIDE SERPL-SCNC: 105 MMOL/L — SIGNIFICANT CHANGE UP (ref 96–108)
CO2 SERPL-SCNC: 23 MMOL/L — SIGNIFICANT CHANGE UP (ref 22–29)
CREAT SERPL-MCNC: 2.01 MG/DL — HIGH (ref 0.5–1.3)
EGFR: 31 ML/MIN/1.73M2 — LOW
EGFR: 31 ML/MIN/1.73M2 — LOW
GLUCOSE SERPL-MCNC: 132 MG/DL — HIGH (ref 70–99)
HCT VFR BLD CALC: 29.3 % — LOW (ref 39–50)
HGB BLD-MCNC: 9.4 G/DL — LOW (ref 13–17)
IMMATURE PLATELET FRACTION #: 2.1 K/UL — LOW (ref 3.9–12.5)
IMMATURE PLATELET FRACTION %: 2.2 % — SIGNIFICANT CHANGE UP (ref 1.6–7.1)
MCHC RBC-ENTMCNC: 28.8 PG — SIGNIFICANT CHANGE UP (ref 27–34)
MCHC RBC-ENTMCNC: 32.1 G/DL — SIGNIFICANT CHANGE UP (ref 32–36)
MCV RBC AUTO: 89.9 FL — SIGNIFICANT CHANGE UP (ref 80–100)
NRBC # BLD AUTO: 0 K/UL — SIGNIFICANT CHANGE UP (ref 0–0)
NRBC # FLD: 0 K/UL — SIGNIFICANT CHANGE UP (ref 0–0)
NRBC BLD AUTO-RTO: 0 /100 WBCS — SIGNIFICANT CHANGE UP (ref 0–0)
PLATELET # BLD AUTO: 94 K/UL — LOW (ref 150–400)
PMV BLD: 11.4 FL — SIGNIFICANT CHANGE UP (ref 7–13)
POTASSIUM SERPL-MCNC: 4.4 MMOL/L — SIGNIFICANT CHANGE UP (ref 3.5–5.3)
POTASSIUM SERPL-SCNC: 4.4 MMOL/L — SIGNIFICANT CHANGE UP (ref 3.5–5.3)
RBC # BLD: 3.26 M/UL — LOW (ref 4.2–5.8)
RBC # FLD: 17.4 % — HIGH (ref 10.3–14.5)
SODIUM SERPL-SCNC: 143 MMOL/L — SIGNIFICANT CHANGE UP (ref 135–145)
WBC # BLD: 5.56 K/UL — SIGNIFICANT CHANGE UP (ref 3.8–10.5)
WBC # FLD AUTO: 5.56 K/UL — SIGNIFICANT CHANGE UP (ref 3.8–10.5)

## 2025-08-10 PROCEDURE — 99233 SBSQ HOSP IP/OBS HIGH 50: CPT

## 2025-08-10 RX ADMIN — Medication 650 MILLIGRAM(S): at 05:40

## 2025-08-10 RX ADMIN — DORZOLAMIDE 1 DROP(S): 20 SOLUTION/ DROPS OPHTHALMIC at 09:01

## 2025-08-10 RX ADMIN — DORZOLAMIDE 1 DROP(S): 20 SOLUTION/ DROPS OPHTHALMIC at 21:24

## 2025-08-10 RX ADMIN — LATANOPROST PF 1 DROP(S): 0.05 SOLUTION/ DROPS OPHTHALMIC at 21:24

## 2025-08-10 RX ADMIN — Medication 650 MILLIGRAM(S): at 21:30

## 2025-08-10 RX ADMIN — APIXABAN 5 MILLIGRAM(S): 5 TABLET, FILM COATED ORAL at 05:40

## 2025-08-10 RX ADMIN — Medication 81 MILLIGRAM(S): at 21:25

## 2025-08-10 RX ADMIN — ATORVASTATIN CALCIUM 20 MILLIGRAM(S): 80 TABLET, FILM COATED ORAL at 21:25

## 2025-08-10 RX ADMIN — Medication 650 MILLIGRAM(S): at 08:35

## 2025-08-10 RX ADMIN — Medication 325 MILLIGRAM(S): at 13:26

## 2025-08-10 RX ADMIN — POLYETHYLENE GLYCOL 3350 17 GRAM(S): 17 POWDER, FOR SOLUTION ORAL at 13:27

## 2025-08-10 RX ADMIN — Medication 1 APPLICATION(S): at 05:41

## 2025-08-10 RX ADMIN — Medication 650 MILLIGRAM(S): at 21:24

## 2025-08-10 RX ADMIN — DOXAZOSIN MESYLATE 8 MILLIGRAM(S): 8 TABLET ORAL at 21:24

## 2025-08-10 RX ADMIN — Medication 650 MILLIGRAM(S): at 14:05

## 2025-08-10 RX ADMIN — Medication 650 MILLIGRAM(S): at 13:27

## 2025-08-11 LAB
ANION GAP SERPL CALC-SCNC: 15 MMOL/L — SIGNIFICANT CHANGE UP (ref 5–17)
BUN SERPL-MCNC: 78.7 MG/DL — HIGH (ref 8–20)
CALCIUM SERPL-MCNC: 8.4 MG/DL — SIGNIFICANT CHANGE UP (ref 8.4–10.5)
CHLORIDE SERPL-SCNC: 104 MMOL/L — SIGNIFICANT CHANGE UP (ref 96–108)
CO2 SERPL-SCNC: 21 MMOL/L — LOW (ref 22–29)
CREAT SERPL-MCNC: 1.85 MG/DL — HIGH (ref 0.5–1.3)
EGFR: 34 ML/MIN/1.73M2 — LOW
EGFR: 34 ML/MIN/1.73M2 — LOW
GLUCOSE SERPL-MCNC: 124 MG/DL — HIGH (ref 70–99)
HCT VFR BLD CALC: 27.7 % — LOW (ref 39–50)
HGB BLD-MCNC: 8.6 G/DL — LOW (ref 13–17)
MCHC RBC-ENTMCNC: 29 PG — SIGNIFICANT CHANGE UP (ref 27–34)
MCHC RBC-ENTMCNC: 31 G/DL — LOW (ref 32–36)
MCV RBC AUTO: 93.3 FL — SIGNIFICANT CHANGE UP (ref 80–100)
NRBC # BLD AUTO: 0 K/UL — SIGNIFICANT CHANGE UP (ref 0–0)
NRBC # FLD: 0 K/UL — SIGNIFICANT CHANGE UP (ref 0–0)
NRBC BLD AUTO-RTO: 0 /100 WBCS — SIGNIFICANT CHANGE UP (ref 0–0)
PLATELET # BLD AUTO: 105 K/UL — LOW (ref 150–400)
PMV BLD: 11.6 FL — SIGNIFICANT CHANGE UP (ref 7–13)
POTASSIUM SERPL-MCNC: 4.2 MMOL/L — SIGNIFICANT CHANGE UP (ref 3.5–5.3)
POTASSIUM SERPL-SCNC: 4.2 MMOL/L — SIGNIFICANT CHANGE UP (ref 3.5–5.3)
RBC # BLD: 2.97 M/UL — LOW (ref 4.2–5.8)
RBC # FLD: 17 % — HIGH (ref 10.3–14.5)
SODIUM SERPL-SCNC: 140 MMOL/L — SIGNIFICANT CHANGE UP (ref 135–145)
WBC # BLD: 5.7 K/UL — SIGNIFICANT CHANGE UP (ref 3.8–10.5)
WBC # FLD AUTO: 5.7 K/UL — SIGNIFICANT CHANGE UP (ref 3.8–10.5)

## 2025-08-11 PROCEDURE — 85014 HEMATOCRIT: CPT

## 2025-08-11 PROCEDURE — 80048 BASIC METABOLIC PNL TOTAL CA: CPT

## 2025-08-11 PROCEDURE — 83605 ASSAY OF LACTIC ACID: CPT

## 2025-08-11 PROCEDURE — 83880 ASSAY OF NATRIURETIC PEPTIDE: CPT

## 2025-08-11 PROCEDURE — 86901 BLOOD TYPING SEROLOGIC RH(D): CPT

## 2025-08-11 PROCEDURE — 93005 ELECTROCARDIOGRAM TRACING: CPT

## 2025-08-11 PROCEDURE — 83550 IRON BINDING TEST: CPT

## 2025-08-11 PROCEDURE — 84100 ASSAY OF PHOSPHORUS: CPT

## 2025-08-11 PROCEDURE — 80053 COMPREHEN METABOLIC PANEL: CPT

## 2025-08-11 PROCEDURE — 85730 THROMBOPLASTIN TIME PARTIAL: CPT

## 2025-08-11 PROCEDURE — 82962 GLUCOSE BLOOD TEST: CPT

## 2025-08-11 PROCEDURE — 87640 STAPH A DNA AMP PROBE: CPT

## 2025-08-11 PROCEDURE — 73502 X-RAY EXAM HIP UNI 2-3 VIEWS: CPT

## 2025-08-11 PROCEDURE — 86923 COMPATIBILITY TEST ELECTRIC: CPT

## 2025-08-11 PROCEDURE — 83735 ASSAY OF MAGNESIUM: CPT

## 2025-08-11 PROCEDURE — 36415 COLL VENOUS BLD VENIPUNCTURE: CPT

## 2025-08-11 PROCEDURE — 0042T: CPT

## 2025-08-11 PROCEDURE — C8929: CPT

## 2025-08-11 PROCEDURE — 82728 ASSAY OF FERRITIN: CPT

## 2025-08-11 PROCEDURE — 70450 CT HEAD/BRAIN W/O DYE: CPT

## 2025-08-11 PROCEDURE — C1769: CPT

## 2025-08-11 PROCEDURE — 94660 CPAP INITIATION&MGMT: CPT

## 2025-08-11 PROCEDURE — 84443 ASSAY THYROID STIM HORMONE: CPT

## 2025-08-11 PROCEDURE — 70496 CT ANGIOGRAPHY HEAD: CPT

## 2025-08-11 PROCEDURE — 82803 BLOOD GASES ANY COMBINATION: CPT

## 2025-08-11 PROCEDURE — 84484 ASSAY OF TROPONIN QUANT: CPT

## 2025-08-11 PROCEDURE — 80061 LIPID PANEL: CPT

## 2025-08-11 PROCEDURE — 70498 CT ANGIOGRAPHY NECK: CPT

## 2025-08-11 PROCEDURE — 82435 ASSAY OF BLOOD CHLORIDE: CPT

## 2025-08-11 PROCEDURE — P9016: CPT

## 2025-08-11 PROCEDURE — 85610 PROTHROMBIN TIME: CPT

## 2025-08-11 PROCEDURE — 82330 ASSAY OF CALCIUM: CPT

## 2025-08-11 PROCEDURE — 87641 MR-STAPH DNA AMP PROBE: CPT

## 2025-08-11 PROCEDURE — 84295 ASSAY OF SERUM SODIUM: CPT

## 2025-08-11 PROCEDURE — 85025 COMPLETE CBC W/AUTO DIFF WBC: CPT

## 2025-08-11 PROCEDURE — 86850 RBC ANTIBODY SCREEN: CPT

## 2025-08-11 PROCEDURE — 86900 BLOOD TYPING SEROLOGIC ABO: CPT

## 2025-08-11 PROCEDURE — 36430 TRANSFUSION BLD/BLD COMPNT: CPT

## 2025-08-11 PROCEDURE — 73552 X-RAY EXAM OF FEMUR 2/>: CPT

## 2025-08-11 PROCEDURE — C1889: CPT

## 2025-08-11 PROCEDURE — 94640 AIRWAY INHALATION TREATMENT: CPT

## 2025-08-11 PROCEDURE — 94760 N-INVAS EAR/PLS OXIMETRY 1: CPT

## 2025-08-11 PROCEDURE — 85027 COMPLETE CBC AUTOMATED: CPT

## 2025-08-11 PROCEDURE — 83540 ASSAY OF IRON: CPT

## 2025-08-11 PROCEDURE — 80076 HEPATIC FUNCTION PANEL: CPT

## 2025-08-11 PROCEDURE — 97530 THERAPEUTIC ACTIVITIES: CPT

## 2025-08-11 PROCEDURE — 84466 ASSAY OF TRANSFERRIN: CPT

## 2025-08-11 PROCEDURE — 71045 X-RAY EXAM CHEST 1 VIEW: CPT

## 2025-08-11 PROCEDURE — 85018 HEMOGLOBIN: CPT

## 2025-08-11 PROCEDURE — 84132 ASSAY OF SERUM POTASSIUM: CPT

## 2025-08-11 PROCEDURE — 99233 SBSQ HOSP IP/OBS HIGH 50: CPT

## 2025-08-11 PROCEDURE — 82947 ASSAY GLUCOSE BLOOD QUANT: CPT

## 2025-08-11 PROCEDURE — C1776: CPT

## 2025-08-11 RX ORDER — HEPARIN SODIUM 1000 [USP'U]/ML
5000 INJECTION INTRAVENOUS; SUBCUTANEOUS EVERY 12 HOURS
Refills: 0 | Status: DISCONTINUED | OUTPATIENT
Start: 2025-08-11 | End: 2025-08-14

## 2025-08-11 RX ADMIN — Medication 325 MILLIGRAM(S): at 13:26

## 2025-08-11 RX ADMIN — DORZOLAMIDE 1 DROP(S): 20 SOLUTION/ DROPS OPHTHALMIC at 08:21

## 2025-08-11 RX ADMIN — DOXAZOSIN MESYLATE 8 MILLIGRAM(S): 8 TABLET ORAL at 21:52

## 2025-08-11 RX ADMIN — Medication 650 MILLIGRAM(S): at 15:10

## 2025-08-11 RX ADMIN — HEPARIN SODIUM 5000 UNIT(S): 1000 INJECTION INTRAVENOUS; SUBCUTANEOUS at 21:51

## 2025-08-11 RX ADMIN — POLYETHYLENE GLYCOL 3350 17 GRAM(S): 17 POWDER, FOR SOLUTION ORAL at 13:27

## 2025-08-11 RX ADMIN — LATANOPROST PF 1 DROP(S): 0.05 SOLUTION/ DROPS OPHTHALMIC at 21:32

## 2025-08-11 RX ADMIN — Medication 650 MILLIGRAM(S): at 05:00

## 2025-08-11 RX ADMIN — DORZOLAMIDE 1 DROP(S): 20 SOLUTION/ DROPS OPHTHALMIC at 21:32

## 2025-08-11 RX ADMIN — Medication 1 APPLICATION(S): at 05:00

## 2025-08-11 RX ADMIN — Medication 81 MILLIGRAM(S): at 21:52

## 2025-08-11 RX ADMIN — Medication 650 MILLIGRAM(S): at 13:26

## 2025-08-11 RX ADMIN — Medication 650 MILLIGRAM(S): at 21:52

## 2025-08-11 RX ADMIN — ATORVASTATIN CALCIUM 20 MILLIGRAM(S): 80 TABLET, FILM COATED ORAL at 21:52

## 2025-08-12 LAB
ANION GAP SERPL CALC-SCNC: 13 MMOL/L — SIGNIFICANT CHANGE UP (ref 5–17)
BUN SERPL-MCNC: 84 MG/DL — HIGH (ref 8–20)
CALCIUM SERPL-MCNC: 8.2 MG/DL — LOW (ref 8.4–10.5)
CHLORIDE SERPL-SCNC: 104 MMOL/L — SIGNIFICANT CHANGE UP (ref 96–108)
CO2 SERPL-SCNC: 23 MMOL/L — SIGNIFICANT CHANGE UP (ref 22–29)
CREAT SERPL-MCNC: 1.83 MG/DL — HIGH (ref 0.5–1.3)
EGFR: 34 ML/MIN/1.73M2 — LOW
EGFR: 34 ML/MIN/1.73M2 — LOW
GLUCOSE SERPL-MCNC: 130 MG/DL — HIGH (ref 70–99)
HCT VFR BLD CALC: 26.9 % — LOW (ref 39–50)
HGB BLD-MCNC: 8.7 G/DL — LOW (ref 13–17)
MCHC RBC-ENTMCNC: 29.2 PG — SIGNIFICANT CHANGE UP (ref 27–34)
MCHC RBC-ENTMCNC: 32.3 G/DL — SIGNIFICANT CHANGE UP (ref 32–36)
MCV RBC AUTO: 90.3 FL — SIGNIFICANT CHANGE UP (ref 80–100)
NRBC # BLD AUTO: 0 K/UL — SIGNIFICANT CHANGE UP (ref 0–0)
NRBC # FLD: 0 K/UL — SIGNIFICANT CHANGE UP (ref 0–0)
NRBC BLD AUTO-RTO: 0 /100 WBCS — SIGNIFICANT CHANGE UP (ref 0–0)
PLATELET # BLD AUTO: 102 K/UL — LOW (ref 150–400)
PMV BLD: 11.6 FL — SIGNIFICANT CHANGE UP (ref 7–13)
POTASSIUM SERPL-MCNC: 3.9 MMOL/L — SIGNIFICANT CHANGE UP (ref 3.5–5.3)
POTASSIUM SERPL-SCNC: 3.9 MMOL/L — SIGNIFICANT CHANGE UP (ref 3.5–5.3)
RBC # BLD: 2.98 M/UL — LOW (ref 4.2–5.8)
RBC # FLD: 16.6 % — HIGH (ref 10.3–14.5)
SODIUM SERPL-SCNC: 140 MMOL/L — SIGNIFICANT CHANGE UP (ref 135–145)
WBC # BLD: 4.6 K/UL — SIGNIFICANT CHANGE UP (ref 3.8–10.5)
WBC # FLD AUTO: 4.6 K/UL — SIGNIFICANT CHANGE UP (ref 3.8–10.5)

## 2025-08-12 PROCEDURE — 70496 CT ANGIOGRAPHY HEAD: CPT

## 2025-08-12 PROCEDURE — 85730 THROMBOPLASTIN TIME PARTIAL: CPT

## 2025-08-12 PROCEDURE — 86850 RBC ANTIBODY SCREEN: CPT

## 2025-08-12 PROCEDURE — 87640 STAPH A DNA AMP PROBE: CPT

## 2025-08-12 PROCEDURE — 83735 ASSAY OF MAGNESIUM: CPT

## 2025-08-12 PROCEDURE — 85610 PROTHROMBIN TIME: CPT

## 2025-08-12 PROCEDURE — 84132 ASSAY OF SERUM POTASSIUM: CPT

## 2025-08-12 PROCEDURE — 87641 MR-STAPH DNA AMP PROBE: CPT

## 2025-08-12 PROCEDURE — 83880 ASSAY OF NATRIURETIC PEPTIDE: CPT

## 2025-08-12 PROCEDURE — 82330 ASSAY OF CALCIUM: CPT

## 2025-08-12 PROCEDURE — P9016: CPT

## 2025-08-12 PROCEDURE — 82947 ASSAY GLUCOSE BLOOD QUANT: CPT

## 2025-08-12 PROCEDURE — 70498 CT ANGIOGRAPHY NECK: CPT

## 2025-08-12 PROCEDURE — 97530 THERAPEUTIC ACTIVITIES: CPT

## 2025-08-12 PROCEDURE — 0042T: CPT

## 2025-08-12 PROCEDURE — 82728 ASSAY OF FERRITIN: CPT

## 2025-08-12 PROCEDURE — 84443 ASSAY THYROID STIM HORMONE: CPT

## 2025-08-12 PROCEDURE — 70450 CT HEAD/BRAIN W/O DYE: CPT

## 2025-08-12 PROCEDURE — 82435 ASSAY OF BLOOD CHLORIDE: CPT

## 2025-08-12 PROCEDURE — 83550 IRON BINDING TEST: CPT

## 2025-08-12 PROCEDURE — 73552 X-RAY EXAM OF FEMUR 2/>: CPT

## 2025-08-12 PROCEDURE — 80048 BASIC METABOLIC PNL TOTAL CA: CPT

## 2025-08-12 PROCEDURE — 86900 BLOOD TYPING SEROLOGIC ABO: CPT

## 2025-08-12 PROCEDURE — 80061 LIPID PANEL: CPT

## 2025-08-12 PROCEDURE — 94760 N-INVAS EAR/PLS OXIMETRY 1: CPT

## 2025-08-12 PROCEDURE — 85014 HEMATOCRIT: CPT

## 2025-08-12 PROCEDURE — 82962 GLUCOSE BLOOD TEST: CPT

## 2025-08-12 PROCEDURE — C1776: CPT

## 2025-08-12 PROCEDURE — C1769: CPT

## 2025-08-12 PROCEDURE — 94660 CPAP INITIATION&MGMT: CPT

## 2025-08-12 PROCEDURE — 85018 HEMOGLOBIN: CPT

## 2025-08-12 PROCEDURE — 71045 X-RAY EXAM CHEST 1 VIEW: CPT

## 2025-08-12 PROCEDURE — 82803 BLOOD GASES ANY COMBINATION: CPT

## 2025-08-12 PROCEDURE — 36415 COLL VENOUS BLD VENIPUNCTURE: CPT

## 2025-08-12 PROCEDURE — 80053 COMPREHEN METABOLIC PANEL: CPT

## 2025-08-12 PROCEDURE — 94640 AIRWAY INHALATION TREATMENT: CPT

## 2025-08-12 PROCEDURE — 84100 ASSAY OF PHOSPHORUS: CPT

## 2025-08-12 PROCEDURE — 84295 ASSAY OF SERUM SODIUM: CPT

## 2025-08-12 PROCEDURE — 86923 COMPATIBILITY TEST ELECTRIC: CPT

## 2025-08-12 PROCEDURE — 85027 COMPLETE CBC AUTOMATED: CPT

## 2025-08-12 PROCEDURE — 85025 COMPLETE CBC W/AUTO DIFF WBC: CPT

## 2025-08-12 PROCEDURE — 84484 ASSAY OF TROPONIN QUANT: CPT

## 2025-08-12 PROCEDURE — 73502 X-RAY EXAM HIP UNI 2-3 VIEWS: CPT

## 2025-08-12 PROCEDURE — C8929: CPT

## 2025-08-12 PROCEDURE — 36430 TRANSFUSION BLD/BLD COMPNT: CPT

## 2025-08-12 PROCEDURE — 80076 HEPATIC FUNCTION PANEL: CPT

## 2025-08-12 PROCEDURE — 86901 BLOOD TYPING SEROLOGIC RH(D): CPT

## 2025-08-12 PROCEDURE — 99233 SBSQ HOSP IP/OBS HIGH 50: CPT

## 2025-08-12 PROCEDURE — 84466 ASSAY OF TRANSFERRIN: CPT

## 2025-08-12 PROCEDURE — 93005 ELECTROCARDIOGRAM TRACING: CPT

## 2025-08-12 PROCEDURE — 83540 ASSAY OF IRON: CPT

## 2025-08-12 PROCEDURE — 83605 ASSAY OF LACTIC ACID: CPT

## 2025-08-12 PROCEDURE — C1889: CPT

## 2025-08-12 RX ADMIN — DORZOLAMIDE 1 DROP(S): 20 SOLUTION/ DROPS OPHTHALMIC at 08:54

## 2025-08-12 RX ADMIN — Medication 325 MILLIGRAM(S): at 13:39

## 2025-08-12 RX ADMIN — TRAMADOL HYDROCHLORIDE 50 MILLIGRAM(S): 50 TABLET, FILM COATED ORAL at 15:39

## 2025-08-12 RX ADMIN — POLYETHYLENE GLYCOL 3350 17 GRAM(S): 17 POWDER, FOR SOLUTION ORAL at 13:35

## 2025-08-12 RX ADMIN — Medication 650 MILLIGRAM(S): at 05:12

## 2025-08-12 RX ADMIN — HEPARIN SODIUM 5000 UNIT(S): 1000 INJECTION INTRAVENOUS; SUBCUTANEOUS at 17:41

## 2025-08-12 RX ADMIN — DORZOLAMIDE 1 DROP(S): 20 SOLUTION/ DROPS OPHTHALMIC at 21:19

## 2025-08-12 RX ADMIN — LATANOPROST PF 1 DROP(S): 0.05 SOLUTION/ DROPS OPHTHALMIC at 21:19

## 2025-08-12 RX ADMIN — Medication 650 MILLIGRAM(S): at 21:19

## 2025-08-12 RX ADMIN — DOXAZOSIN MESYLATE 8 MILLIGRAM(S): 8 TABLET ORAL at 21:19

## 2025-08-12 RX ADMIN — Medication 1 APPLICATION(S): at 05:12

## 2025-08-12 RX ADMIN — Medication 81 MILLIGRAM(S): at 21:20

## 2025-08-12 RX ADMIN — Medication 650 MILLIGRAM(S): at 14:35

## 2025-08-12 RX ADMIN — HEPARIN SODIUM 5000 UNIT(S): 1000 INJECTION INTRAVENOUS; SUBCUTANEOUS at 05:12

## 2025-08-12 RX ADMIN — TRAMADOL HYDROCHLORIDE 50 MILLIGRAM(S): 50 TABLET, FILM COATED ORAL at 14:39

## 2025-08-12 RX ADMIN — Medication 650 MILLIGRAM(S): at 13:35

## 2025-08-12 RX ADMIN — Medication 2 TABLET(S): at 21:20

## 2025-08-12 RX ADMIN — ATORVASTATIN CALCIUM 20 MILLIGRAM(S): 80 TABLET, FILM COATED ORAL at 21:19

## 2025-08-13 LAB
ANION GAP SERPL CALC-SCNC: 13 MMOL/L — SIGNIFICANT CHANGE UP (ref 5–17)
ANISOCYTOSIS BLD QL: SLIGHT — SIGNIFICANT CHANGE UP
BASOPHILS # BLD AUTO: 0.01 K/UL — SIGNIFICANT CHANGE UP (ref 0–0.2)
BASOPHILS # BLD MANUAL: 0 K/UL — SIGNIFICANT CHANGE UP (ref 0–0.2)
BASOPHILS NFR BLD AUTO: 0.2 % — SIGNIFICANT CHANGE UP (ref 0–2)
BASOPHILS NFR BLD MANUAL: 0 % — SIGNIFICANT CHANGE UP (ref 0–2)
BUN SERPL-MCNC: 83.5 MG/DL — HIGH (ref 8–20)
CALCIUM SERPL-MCNC: 8 MG/DL — LOW (ref 8.4–10.5)
CHLORIDE SERPL-SCNC: 105 MMOL/L — SIGNIFICANT CHANGE UP (ref 96–108)
CO2 SERPL-SCNC: 23 MMOL/L — SIGNIFICANT CHANGE UP (ref 22–29)
CREAT SERPL-MCNC: 1.33 MG/DL — HIGH (ref 0.5–1.3)
EGFR: 50 ML/MIN/1.73M2 — LOW
EGFR: 50 ML/MIN/1.73M2 — LOW
ELLIPTOCYTES BLD QL SMEAR: SLIGHT — SIGNIFICANT CHANGE UP
EOSINOPHIL # BLD AUTO: 0.05 K/UL — SIGNIFICANT CHANGE UP (ref 0–0.5)
EOSINOPHIL # BLD MANUAL: 0.06 K/UL — SIGNIFICANT CHANGE UP (ref 0–0.5)
EOSINOPHIL NFR BLD AUTO: 0.8 % — SIGNIFICANT CHANGE UP (ref 0–6)
EOSINOPHIL NFR BLD MANUAL: 0.9 % — SIGNIFICANT CHANGE UP (ref 0–6)
GLUCOSE SERPL-MCNC: 128 MG/DL — HIGH (ref 70–99)
HCT VFR BLD CALC: 26.7 % — LOW (ref 39–50)
HGB BLD-MCNC: 8.7 G/DL — LOW (ref 13–17)
IMM GRANULOCYTES # BLD AUTO: 0.05 K/UL — SIGNIFICANT CHANGE UP (ref 0–0.07)
IMM GRANULOCYTES NFR BLD AUTO: 0.8 % — SIGNIFICANT CHANGE UP (ref 0–0.9)
LYMPHOCYTES # BLD AUTO: 0.28 K/UL — LOW (ref 1–3.3)
LYMPHOCYTES # BLD MANUAL: 0.11 K/UL — LOW (ref 1–3.3)
LYMPHOCYTES NFR BLD AUTO: 4.4 % — LOW (ref 13–44)
LYMPHOCYTES NFR BLD MANUAL: 1.7 % — LOW (ref 13–44)
MACROCYTES BLD QL: SLIGHT — SIGNIFICANT CHANGE UP
MCHC RBC-ENTMCNC: 29.3 PG — SIGNIFICANT CHANGE UP (ref 27–34)
MCHC RBC-ENTMCNC: 32.6 G/DL — SIGNIFICANT CHANGE UP (ref 32–36)
MCV RBC AUTO: 89.9 FL — SIGNIFICANT CHANGE UP (ref 80–100)
MONOCYTES # BLD AUTO: 0.62 K/UL — SIGNIFICANT CHANGE UP (ref 0–0.9)
MONOCYTES # BLD MANUAL: 0.49 K/UL — SIGNIFICANT CHANGE UP (ref 0–0.9)
MONOCYTES NFR BLD AUTO: 9.7 % — SIGNIFICANT CHANGE UP (ref 2–14)
MONOCYTES NFR BLD MANUAL: 7.7 % — SIGNIFICANT CHANGE UP (ref 2–14)
NEUTROPHILS # BLD AUTO: 5.37 K/UL — SIGNIFICANT CHANGE UP (ref 1.8–7.4)
NEUTROPHILS # BLD MANUAL: 5.72 K/UL — SIGNIFICANT CHANGE UP (ref 1.8–7.4)
NEUTROPHILS NFR BLD AUTO: 84.1 % — HIGH (ref 43–77)
NEUTROPHILS NFR BLD MANUAL: 89.7 % — HIGH (ref 43–77)
NRBC # BLD AUTO: 0 K/UL — SIGNIFICANT CHANGE UP (ref 0–0)
NRBC # FLD: 0 K/UL — SIGNIFICANT CHANGE UP (ref 0–0)
NRBC BLD AUTO-RTO: 0 /100 WBCS — SIGNIFICANT CHANGE UP (ref 0–0)
OVALOCYTES BLD QL SMEAR: SLIGHT — SIGNIFICANT CHANGE UP
PLAT MORPH BLD: NORMAL — SIGNIFICANT CHANGE UP
PLATELET # BLD AUTO: 119 K/UL — LOW (ref 150–400)
PMV BLD: 11.5 FL — SIGNIFICANT CHANGE UP (ref 7–13)
POIKILOCYTOSIS BLD QL AUTO: SLIGHT — SIGNIFICANT CHANGE UP
POLYCHROMASIA BLD QL SMEAR: SLIGHT — SIGNIFICANT CHANGE UP
POTASSIUM SERPL-MCNC: 3.9 MMOL/L — SIGNIFICANT CHANGE UP (ref 3.5–5.3)
POTASSIUM SERPL-SCNC: 3.9 MMOL/L — SIGNIFICANT CHANGE UP (ref 3.5–5.3)
RBC # BLD: 2.97 M/UL — LOW (ref 4.2–5.8)
RBC # FLD: 16.3 % — HIGH (ref 10.3–14.5)
RBC BLD AUTO: ABNORMAL
SODIUM SERPL-SCNC: 141 MMOL/L — SIGNIFICANT CHANGE UP (ref 135–145)
WBC # BLD: 6.38 K/UL — SIGNIFICANT CHANGE UP (ref 3.8–10.5)
WBC # FLD AUTO: 6.38 K/UL — SIGNIFICANT CHANGE UP (ref 3.8–10.5)

## 2025-08-13 PROCEDURE — 99233 SBSQ HOSP IP/OBS HIGH 50: CPT

## 2025-08-13 RX ORDER — PREDNISONE 20 MG/1
50 TABLET ORAL DAILY
Refills: 0 | Status: DISCONTINUED | OUTPATIENT
Start: 2025-08-13 | End: 2025-08-15

## 2025-08-13 RX ORDER — IPRATROPIUM BROMIDE AND ALBUTEROL SULFATE .5; 2.5 MG/3ML; MG/3ML
3 SOLUTION RESPIRATORY (INHALATION) EVERY 6 HOURS
Refills: 0 | Status: DISCONTINUED | OUTPATIENT
Start: 2025-08-13 | End: 2025-08-15

## 2025-08-13 RX ADMIN — PREDNISONE 50 MILLIGRAM(S): 20 TABLET ORAL at 14:53

## 2025-08-13 RX ADMIN — Medication 650 MILLIGRAM(S): at 22:45

## 2025-08-13 RX ADMIN — DOXAZOSIN MESYLATE 8 MILLIGRAM(S): 8 TABLET ORAL at 22:46

## 2025-08-13 RX ADMIN — DORZOLAMIDE 1 DROP(S): 20 SOLUTION/ DROPS OPHTHALMIC at 09:36

## 2025-08-13 RX ADMIN — Medication 650 MILLIGRAM(S): at 06:09

## 2025-08-13 RX ADMIN — Medication 325 MILLIGRAM(S): at 13:32

## 2025-08-13 RX ADMIN — Medication 1 APPLICATION(S): at 05:09

## 2025-08-13 RX ADMIN — IPRATROPIUM BROMIDE AND ALBUTEROL SULFATE 3 MILLILITER(S): .5; 2.5 SOLUTION RESPIRATORY (INHALATION) at 14:33

## 2025-08-13 RX ADMIN — ATORVASTATIN CALCIUM 20 MILLIGRAM(S): 80 TABLET, FILM COATED ORAL at 22:46

## 2025-08-13 RX ADMIN — HEPARIN SODIUM 5000 UNIT(S): 1000 INJECTION INTRAVENOUS; SUBCUTANEOUS at 05:09

## 2025-08-13 RX ADMIN — Medication 2 TABLET(S): at 22:46

## 2025-08-13 RX ADMIN — POLYETHYLENE GLYCOL 3350 17 GRAM(S): 17 POWDER, FOR SOLUTION ORAL at 13:33

## 2025-08-13 RX ADMIN — Medication 81 MILLIGRAM(S): at 22:45

## 2025-08-13 RX ADMIN — Medication 650 MILLIGRAM(S): at 05:09

## 2025-08-13 RX ADMIN — Medication 650 MILLIGRAM(S): at 14:32

## 2025-08-13 RX ADMIN — HEPARIN SODIUM 5000 UNIT(S): 1000 INJECTION INTRAVENOUS; SUBCUTANEOUS at 17:40

## 2025-08-13 RX ADMIN — DORZOLAMIDE 1 DROP(S): 20 SOLUTION/ DROPS OPHTHALMIC at 21:36

## 2025-08-13 RX ADMIN — LATANOPROST PF 1 DROP(S): 0.05 SOLUTION/ DROPS OPHTHALMIC at 22:49

## 2025-08-13 RX ADMIN — Medication 650 MILLIGRAM(S): at 13:32

## 2025-08-13 RX ADMIN — IPRATROPIUM BROMIDE AND ALBUTEROL SULFATE 3 MILLILITER(S): .5; 2.5 SOLUTION RESPIRATORY (INHALATION) at 20:35

## 2025-08-14 LAB
ANION GAP SERPL CALC-SCNC: 12 MMOL/L — SIGNIFICANT CHANGE UP (ref 5–17)
BASOPHILS # BLD AUTO: 0.01 K/UL — SIGNIFICANT CHANGE UP (ref 0–0.2)
BASOPHILS NFR BLD AUTO: 0.1 % — SIGNIFICANT CHANGE UP (ref 0–2)
BUN SERPL-MCNC: 81.8 MG/DL — HIGH (ref 8–20)
CALCIUM SERPL-MCNC: 8.2 MG/DL — LOW (ref 8.4–10.5)
CHLORIDE SERPL-SCNC: 103 MMOL/L — SIGNIFICANT CHANGE UP (ref 96–108)
CO2 SERPL-SCNC: 23 MMOL/L — SIGNIFICANT CHANGE UP (ref 22–29)
CREAT SERPL-MCNC: 1.33 MG/DL — HIGH (ref 0.5–1.3)
EGFR: 50 ML/MIN/1.73M2 — LOW
EGFR: 50 ML/MIN/1.73M2 — LOW
EOSINOPHIL # BLD AUTO: 0 K/UL — SIGNIFICANT CHANGE UP (ref 0–0.5)
EOSINOPHIL NFR BLD AUTO: 0 % — SIGNIFICANT CHANGE UP (ref 0–6)
GLUCOSE SERPL-MCNC: 219 MG/DL — HIGH (ref 70–99)
HCT VFR BLD CALC: 26.5 % — LOW (ref 39–50)
HGB BLD-MCNC: 8.5 G/DL — LOW (ref 13–17)
IMM GRANULOCYTES # BLD AUTO: 0.08 K/UL — HIGH (ref 0–0.07)
IMM GRANULOCYTES NFR BLD AUTO: 0.9 % — SIGNIFICANT CHANGE UP (ref 0–0.9)
LYMPHOCYTES # BLD AUTO: 0.24 K/UL — LOW (ref 1–3.3)
LYMPHOCYTES NFR BLD AUTO: 2.8 % — LOW (ref 13–44)
MAGNESIUM SERPL-MCNC: 2.8 MG/DL — HIGH (ref 1.6–2.6)
MCHC RBC-ENTMCNC: 29.2 PG — SIGNIFICANT CHANGE UP (ref 27–34)
MCHC RBC-ENTMCNC: 32.1 G/DL — SIGNIFICANT CHANGE UP (ref 32–36)
MCV RBC AUTO: 91.1 FL — SIGNIFICANT CHANGE UP (ref 80–100)
MONOCYTES # BLD AUTO: 0.46 K/UL — SIGNIFICANT CHANGE UP (ref 0–0.9)
MONOCYTES NFR BLD AUTO: 5.4 % — SIGNIFICANT CHANGE UP (ref 2–14)
NEUTROPHILS # BLD AUTO: 7.69 K/UL — HIGH (ref 1.8–7.4)
NEUTROPHILS NFR BLD AUTO: 90.8 % — HIGH (ref 43–77)
NRBC # BLD AUTO: 0 K/UL — SIGNIFICANT CHANGE UP (ref 0–0)
NRBC # FLD: 0 K/UL — SIGNIFICANT CHANGE UP (ref 0–0)
NRBC BLD AUTO-RTO: 0 /100 WBCS — SIGNIFICANT CHANGE UP (ref 0–0)
PHOSPHATE SERPL-MCNC: 3.7 MG/DL — SIGNIFICANT CHANGE UP (ref 2.4–4.7)
PLATELET # BLD AUTO: 120 K/UL — LOW (ref 150–400)
PMV BLD: 11.8 FL — SIGNIFICANT CHANGE UP (ref 7–13)
POTASSIUM SERPL-MCNC: 4.1 MMOL/L — SIGNIFICANT CHANGE UP (ref 3.5–5.3)
POTASSIUM SERPL-SCNC: 4.1 MMOL/L — SIGNIFICANT CHANGE UP (ref 3.5–5.3)
RBC # BLD: 2.91 M/UL — LOW (ref 4.2–5.8)
RBC # FLD: 16.5 % — HIGH (ref 10.3–14.5)
SODIUM SERPL-SCNC: 138 MMOL/L — SIGNIFICANT CHANGE UP (ref 135–145)
WBC # BLD: 8.48 K/UL — SIGNIFICANT CHANGE UP (ref 3.8–10.5)
WBC # FLD AUTO: 8.48 K/UL — SIGNIFICANT CHANGE UP (ref 3.8–10.5)

## 2025-08-14 PROCEDURE — 99232 SBSQ HOSP IP/OBS MODERATE 35: CPT

## 2025-08-14 RX ORDER — APIXABAN 5 MG/1
5 TABLET, FILM COATED ORAL
Refills: 0 | Status: DISCONTINUED | OUTPATIENT
Start: 2025-08-14 | End: 2025-08-15

## 2025-08-14 RX ADMIN — DORZOLAMIDE 1 DROP(S): 20 SOLUTION/ DROPS OPHTHALMIC at 20:21

## 2025-08-14 RX ADMIN — PREDNISONE 50 MILLIGRAM(S): 20 TABLET ORAL at 06:30

## 2025-08-14 RX ADMIN — DORZOLAMIDE 1 DROP(S): 20 SOLUTION/ DROPS OPHTHALMIC at 09:15

## 2025-08-14 RX ADMIN — IPRATROPIUM BROMIDE AND ALBUTEROL SULFATE 3 MILLILITER(S): .5; 2.5 SOLUTION RESPIRATORY (INHALATION) at 09:00

## 2025-08-14 RX ADMIN — IPRATROPIUM BROMIDE AND ALBUTEROL SULFATE 3 MILLILITER(S): .5; 2.5 SOLUTION RESPIRATORY (INHALATION) at 14:26

## 2025-08-14 RX ADMIN — APIXABAN 5 MILLIGRAM(S): 5 TABLET, FILM COATED ORAL at 17:05

## 2025-08-14 RX ADMIN — ATORVASTATIN CALCIUM 20 MILLIGRAM(S): 80 TABLET, FILM COATED ORAL at 21:55

## 2025-08-14 RX ADMIN — Medication 81 MILLIGRAM(S): at 21:54

## 2025-08-14 RX ADMIN — IPRATROPIUM BROMIDE AND ALBUTEROL SULFATE 3 MILLILITER(S): .5; 2.5 SOLUTION RESPIRATORY (INHALATION) at 20:27

## 2025-08-14 RX ADMIN — TRAMADOL HYDROCHLORIDE 50 MILLIGRAM(S): 50 TABLET, FILM COATED ORAL at 10:04

## 2025-08-14 RX ADMIN — Medication 650 MILLIGRAM(S): at 21:54

## 2025-08-14 RX ADMIN — IPRATROPIUM BROMIDE AND ALBUTEROL SULFATE 3 MILLILITER(S): .5; 2.5 SOLUTION RESPIRATORY (INHALATION) at 02:46

## 2025-08-14 RX ADMIN — Medication 325 MILLIGRAM(S): at 12:20

## 2025-08-14 RX ADMIN — LATANOPROST PF 1 DROP(S): 0.05 SOLUTION/ DROPS OPHTHALMIC at 21:55

## 2025-08-14 RX ADMIN — Medication 1 APPLICATION(S): at 06:36

## 2025-08-14 RX ADMIN — Medication 2 TABLET(S): at 21:54

## 2025-08-14 RX ADMIN — HEPARIN SODIUM 5000 UNIT(S): 1000 INJECTION INTRAVENOUS; SUBCUTANEOUS at 06:32

## 2025-08-14 RX ADMIN — Medication 650 MILLIGRAM(S): at 06:31

## 2025-08-14 RX ADMIN — DOXAZOSIN MESYLATE 8 MILLIGRAM(S): 8 TABLET ORAL at 21:54

## 2025-08-14 RX ADMIN — Medication 650 MILLIGRAM(S): at 14:56

## 2025-08-15 ENCOUNTER — TRANSCRIPTION ENCOUNTER (OUTPATIENT)
Age: 89
End: 2025-08-15

## 2025-08-15 VITALS
RESPIRATION RATE: 18 BRPM | OXYGEN SATURATION: 97 % | DIASTOLIC BLOOD PRESSURE: 70 MMHG | SYSTOLIC BLOOD PRESSURE: 133 MMHG | HEART RATE: 49 BPM | TEMPERATURE: 98 F

## 2025-08-15 LAB
ANION GAP SERPL CALC-SCNC: 14 MMOL/L — SIGNIFICANT CHANGE UP (ref 5–17)
BASOPHILS # BLD AUTO: 0 K/UL — SIGNIFICANT CHANGE UP (ref 0–0.2)
BASOPHILS NFR BLD AUTO: 0 % — SIGNIFICANT CHANGE UP (ref 0–2)
BUN SERPL-MCNC: 86.5 MG/DL — HIGH (ref 8–20)
CALCIUM SERPL-MCNC: 8.4 MG/DL — SIGNIFICANT CHANGE UP (ref 8.4–10.5)
CHLORIDE SERPL-SCNC: 101 MMOL/L — SIGNIFICANT CHANGE UP (ref 96–108)
CO2 SERPL-SCNC: 22 MMOL/L — SIGNIFICANT CHANGE UP (ref 22–29)
CREAT SERPL-MCNC: 1.44 MG/DL — HIGH (ref 0.5–1.3)
EGFR: 46 ML/MIN/1.73M2 — LOW
EGFR: 46 ML/MIN/1.73M2 — LOW
EOSINOPHIL # BLD AUTO: 0 K/UL — SIGNIFICANT CHANGE UP (ref 0–0.5)
EOSINOPHIL NFR BLD AUTO: 0 % — SIGNIFICANT CHANGE UP (ref 0–6)
GLUCOSE SERPL-MCNC: 149 MG/DL — HIGH (ref 70–99)
HCT VFR BLD CALC: 27.1 % — LOW (ref 39–50)
HGB BLD-MCNC: 8.8 G/DL — LOW (ref 13–17)
IMM GRANULOCYTES # BLD AUTO: 0.11 K/UL — HIGH (ref 0–0.07)
IMM GRANULOCYTES NFR BLD AUTO: 1.5 % — HIGH (ref 0–0.9)
LYMPHOCYTES # BLD AUTO: 0.28 K/UL — LOW (ref 1–3.3)
LYMPHOCYTES NFR BLD AUTO: 3.9 % — LOW (ref 13–44)
MCHC RBC-ENTMCNC: 29 PG — SIGNIFICANT CHANGE UP (ref 27–34)
MCHC RBC-ENTMCNC: 32.5 G/DL — SIGNIFICANT CHANGE UP (ref 32–36)
MCV RBC AUTO: 89.4 FL — SIGNIFICANT CHANGE UP (ref 80–100)
MONOCYTES # BLD AUTO: 0.56 K/UL — SIGNIFICANT CHANGE UP (ref 0–0.9)
MONOCYTES NFR BLD AUTO: 7.8 % — SIGNIFICANT CHANGE UP (ref 2–14)
NEUTROPHILS # BLD AUTO: 6.2 K/UL — SIGNIFICANT CHANGE UP (ref 1.8–7.4)
NEUTROPHILS NFR BLD AUTO: 86.8 % — HIGH (ref 43–77)
NRBC # BLD AUTO: 0 K/UL — SIGNIFICANT CHANGE UP (ref 0–0)
NRBC # FLD: 0 K/UL — SIGNIFICANT CHANGE UP (ref 0–0)
NRBC BLD AUTO-RTO: 0 /100 WBCS — SIGNIFICANT CHANGE UP (ref 0–0)
PLATELET # BLD AUTO: 141 K/UL — LOW (ref 150–400)
PMV BLD: 11.4 FL — SIGNIFICANT CHANGE UP (ref 7–13)
POTASSIUM SERPL-MCNC: 4 MMOL/L — SIGNIFICANT CHANGE UP (ref 3.5–5.3)
POTASSIUM SERPL-SCNC: 4 MMOL/L — SIGNIFICANT CHANGE UP (ref 3.5–5.3)
RBC # BLD: 3.03 M/UL — LOW (ref 4.2–5.8)
RBC # FLD: 16.2 % — HIGH (ref 10.3–14.5)
SODIUM SERPL-SCNC: 137 MMOL/L — SIGNIFICANT CHANGE UP (ref 135–145)
WBC # BLD: 7.15 K/UL — SIGNIFICANT CHANGE UP (ref 3.8–10.5)
WBC # FLD AUTO: 7.15 K/UL — SIGNIFICANT CHANGE UP (ref 3.8–10.5)

## 2025-08-15 PROCEDURE — 85027 COMPLETE CBC AUTOMATED: CPT

## 2025-08-15 PROCEDURE — 80061 LIPID PANEL: CPT

## 2025-08-15 PROCEDURE — 86901 BLOOD TYPING SEROLOGIC RH(D): CPT

## 2025-08-15 PROCEDURE — 85610 PROTHROMBIN TIME: CPT

## 2025-08-15 PROCEDURE — C9399: CPT

## 2025-08-15 PROCEDURE — 73502 X-RAY EXAM HIP UNI 2-3 VIEWS: CPT

## 2025-08-15 PROCEDURE — 96372 THER/PROPH/DIAG INJ SC/IM: CPT

## 2025-08-15 PROCEDURE — 86850 RBC ANTIBODY SCREEN: CPT

## 2025-08-15 PROCEDURE — 36415 COLL VENOUS BLD VENIPUNCTURE: CPT

## 2025-08-15 PROCEDURE — 83605 ASSAY OF LACTIC ACID: CPT

## 2025-08-15 PROCEDURE — 86923 COMPATIBILITY TEST ELECTRIC: CPT

## 2025-08-15 PROCEDURE — 84466 ASSAY OF TRANSFERRIN: CPT

## 2025-08-15 PROCEDURE — 83735 ASSAY OF MAGNESIUM: CPT

## 2025-08-15 PROCEDURE — 84295 ASSAY OF SERUM SODIUM: CPT

## 2025-08-15 PROCEDURE — 73552 X-RAY EXAM OF FEMUR 2/>: CPT

## 2025-08-15 PROCEDURE — 71045 X-RAY EXAM CHEST 1 VIEW: CPT

## 2025-08-15 PROCEDURE — C1769: CPT

## 2025-08-15 PROCEDURE — 97530 THERAPEUTIC ACTIVITIES: CPT

## 2025-08-15 PROCEDURE — 82728 ASSAY OF FERRITIN: CPT

## 2025-08-15 PROCEDURE — 83540 ASSAY OF IRON: CPT

## 2025-08-15 PROCEDURE — 82803 BLOOD GASES ANY COMBINATION: CPT

## 2025-08-15 PROCEDURE — C1889: CPT

## 2025-08-15 PROCEDURE — 80076 HEPATIC FUNCTION PANEL: CPT

## 2025-08-15 PROCEDURE — 83880 ASSAY OF NATRIURETIC PEPTIDE: CPT

## 2025-08-15 PROCEDURE — 0042T: CPT

## 2025-08-15 PROCEDURE — 85018 HEMOGLOBIN: CPT

## 2025-08-15 PROCEDURE — 94640 AIRWAY INHALATION TREATMENT: CPT

## 2025-08-15 PROCEDURE — 87641 MR-STAPH DNA AMP PROBE: CPT

## 2025-08-15 PROCEDURE — 84100 ASSAY OF PHOSPHORUS: CPT

## 2025-08-15 PROCEDURE — 82330 ASSAY OF CALCIUM: CPT

## 2025-08-15 PROCEDURE — 80048 BASIC METABOLIC PNL TOTAL CA: CPT

## 2025-08-15 PROCEDURE — 70498 CT ANGIOGRAPHY NECK: CPT

## 2025-08-15 PROCEDURE — 83550 IRON BINDING TEST: CPT

## 2025-08-15 PROCEDURE — 80053 COMPREHEN METABOLIC PANEL: CPT

## 2025-08-15 PROCEDURE — 96374 THER/PROPH/DIAG INJ IV PUSH: CPT

## 2025-08-15 PROCEDURE — 84484 ASSAY OF TROPONIN QUANT: CPT

## 2025-08-15 PROCEDURE — 94760 N-INVAS EAR/PLS OXIMETRY 1: CPT

## 2025-08-15 PROCEDURE — 94660 CPAP INITIATION&MGMT: CPT

## 2025-08-15 PROCEDURE — 82947 ASSAY GLUCOSE BLOOD QUANT: CPT

## 2025-08-15 PROCEDURE — 84443 ASSAY THYROID STIM HORMONE: CPT

## 2025-08-15 PROCEDURE — 85025 COMPLETE CBC W/AUTO DIFF WBC: CPT

## 2025-08-15 PROCEDURE — 85014 HEMATOCRIT: CPT

## 2025-08-15 PROCEDURE — 70450 CT HEAD/BRAIN W/O DYE: CPT

## 2025-08-15 PROCEDURE — 82435 ASSAY OF BLOOD CHLORIDE: CPT

## 2025-08-15 PROCEDURE — P9016: CPT

## 2025-08-15 PROCEDURE — 70496 CT ANGIOGRAPHY HEAD: CPT

## 2025-08-15 PROCEDURE — 87640 STAPH A DNA AMP PROBE: CPT

## 2025-08-15 PROCEDURE — 86900 BLOOD TYPING SEROLOGIC ABO: CPT

## 2025-08-15 PROCEDURE — 85730 THROMBOPLASTIN TIME PARTIAL: CPT

## 2025-08-15 PROCEDURE — 84132 ASSAY OF SERUM POTASSIUM: CPT

## 2025-08-15 PROCEDURE — C1776: CPT

## 2025-08-15 PROCEDURE — 93005 ELECTROCARDIOGRAM TRACING: CPT

## 2025-08-15 PROCEDURE — C8929: CPT

## 2025-08-15 PROCEDURE — 36430 TRANSFUSION BLD/BLD COMPNT: CPT

## 2025-08-15 PROCEDURE — 82962 GLUCOSE BLOOD TEST: CPT

## 2025-08-15 PROCEDURE — 99291 CRITICAL CARE FIRST HOUR: CPT

## 2025-08-15 PROCEDURE — 99239 HOSP IP/OBS DSCHRG MGMT >30: CPT

## 2025-08-15 PROCEDURE — 96375 TX/PRO/DX INJ NEW DRUG ADDON: CPT

## 2025-08-15 RX ORDER — ACETAMINOPHEN 500 MG/5ML
2 LIQUID (ML) ORAL
Qty: 0 | Refills: 0 | DISCHARGE
Start: 2025-08-15

## 2025-08-15 RX ORDER — POLYETHYLENE GLYCOL 3350 17 G/17G
17 POWDER, FOR SOLUTION ORAL
Qty: 0 | Refills: 0 | DISCHARGE
Start: 2025-08-15

## 2025-08-15 RX ORDER — PREDNISONE 20 MG/1
1 TABLET ORAL
Qty: 2 | Refills: 0
Start: 2025-08-15 | End: 2025-08-16

## 2025-08-15 RX ORDER — FERROUS SULFATE 137(45) MG
1 TABLET, EXTENDED RELEASE ORAL
Qty: 0 | Refills: 0 | DISCHARGE
Start: 2025-08-15

## 2025-08-15 RX ORDER — OXYCODONE HYDROCHLORIDE AND ACETAMINOPHEN 10; 325 MG/1; MG/1
1 TABLET ORAL
Qty: 0 | Refills: 0 | DISCHARGE
Start: 2025-08-15

## 2025-08-15 RX ORDER — DOXAZOSIN MESYLATE 8 MG/1
1 TABLET ORAL
Qty: 0 | Refills: 0 | DISCHARGE
Start: 2025-08-15

## 2025-08-15 RX ORDER — TRAMADOL HYDROCHLORIDE 50 MG/1
1 TABLET, FILM COATED ORAL
Qty: 0 | Refills: 0 | DISCHARGE
Start: 2025-08-15

## 2025-08-15 RX ORDER — SENNA 187 MG
2 TABLET ORAL
Qty: 0 | Refills: 0 | DISCHARGE
Start: 2025-08-15

## 2025-08-15 RX ORDER — LISINOPRIL 5 MG/1
1 TABLET ORAL
Refills: 0 | DISCHARGE

## 2025-08-15 RX ADMIN — IPRATROPIUM BROMIDE AND ALBUTEROL SULFATE 3 MILLILITER(S): .5; 2.5 SOLUTION RESPIRATORY (INHALATION) at 04:10

## 2025-08-15 RX ADMIN — Medication 650 MILLIGRAM(S): at 10:01

## 2025-08-15 RX ADMIN — IPRATROPIUM BROMIDE AND ALBUTEROL SULFATE 3 MILLILITER(S): .5; 2.5 SOLUTION RESPIRATORY (INHALATION) at 08:24

## 2025-08-15 RX ADMIN — DORZOLAMIDE 1 DROP(S): 20 SOLUTION/ DROPS OPHTHALMIC at 10:34

## 2025-08-15 RX ADMIN — APIXABAN 5 MILLIGRAM(S): 5 TABLET, FILM COATED ORAL at 06:34

## 2025-08-15 RX ADMIN — PREDNISONE 50 MILLIGRAM(S): 20 TABLET ORAL at 06:34

## 2025-08-15 RX ADMIN — Medication 1 APPLICATION(S): at 06:35

## 2025-08-15 RX ADMIN — Medication 650 MILLIGRAM(S): at 06:34

## 2025-08-15 RX ADMIN — Medication 325 MILLIGRAM(S): at 13:14

## 2025-08-22 ENCOUNTER — APPOINTMENT (OUTPATIENT)
Dept: CARDIOLOGY | Facility: CLINIC | Age: 89
End: 2025-08-22

## 2025-08-27 ENCOUNTER — APPOINTMENT (OUTPATIENT)
Dept: ORTHOPEDIC SURGERY | Facility: CLINIC | Age: 89
End: 2025-08-27

## 2025-09-10 ENCOUNTER — RX RENEWAL (OUTPATIENT)
Age: 89
End: 2025-09-10

## 2025-09-16 ENCOUNTER — RX RENEWAL (OUTPATIENT)
Age: 89
End: 2025-09-16

## (undated) DEVICE — SUT VICRYL PLUS 0 27" CT-2 UNDYED

## (undated) DEVICE — DRAPE 3/4 SHEET 52X76"

## (undated) DEVICE — DRSG TEGADERM 6 X 8"

## (undated) DEVICE — SUT MONOCRYL 3-0 27" PS-2 UNDYED

## (undated) DEVICE — SUT ETHIBOND 5 4-30" CCS

## (undated) DEVICE — SOL IRR POUR H2O 1500ML

## (undated) DEVICE — DRAPE 1/2 SHEET 40X57"

## (undated) DEVICE — SUT HEWSON RETRIEVER

## (undated) DEVICE — VISITEC 4X4

## (undated) DEVICE — SOL IRR POUR NS 0.9% 1000ML

## (undated) DEVICE — SUT VICRYL 2-0 27" CT-2 UNDYED

## (undated) DEVICE — SOL IRR BAG NS 0.9% 3000ML

## (undated) DEVICE — NDL HYPO SAFE 20G X 1.5" (YELLOW)

## (undated) DEVICE — PACK TOTAL HIP

## (undated) DEVICE — SAW BLADE ZIMMER RECIPROCATING SINGLE SIDED 10X70X1.19MM

## (undated) DEVICE — WARMING BLANKET UPPER ADULT

## (undated) DEVICE — DRAPE U LONG (CLEAR) 47 X 70"

## (undated) DEVICE — ZIMMER COMPACT VACUUM CEMENT MIXING SYSTEM

## (undated) DEVICE — WOUND IRR SURGIPHOR

## (undated) DEVICE — DRAPE HIP W POUCHES 87X115X134"

## (undated) DEVICE — SYR LUER LOK 50CC

## (undated) DEVICE — DRSG DERMABOND PRINEO 60CM

## (undated) DEVICE — SUT VICRYL 0 27" CP-1 UNDYED

## (undated) DEVICE — SOL IRR POUR NS 0.9% 500ML

## (undated) DEVICE — DRAPE XL SHEET 77X98"

## (undated) DEVICE — Device